# Patient Record
Sex: MALE | Race: WHITE | NOT HISPANIC OR LATINO | Employment: OTHER | URBAN - METROPOLITAN AREA
[De-identification: names, ages, dates, MRNs, and addresses within clinical notes are randomized per-mention and may not be internally consistent; named-entity substitution may affect disease eponyms.]

---

## 2017-03-27 DIAGNOSIS — M54.2 NECK PAIN: ICD-10-CM

## 2017-03-27 DIAGNOSIS — M12.30 PALINDROMIC RHEUMATISM: ICD-10-CM

## 2017-03-27 DIAGNOSIS — N62 GYNECOMASTIA: ICD-10-CM

## 2017-03-27 DIAGNOSIS — R79.89 ABNORMAL LIVER FUNCTION TESTS: ICD-10-CM

## 2017-03-27 PROBLEM — G47.30 SLEEP APNEA: Status: ACTIVE | Noted: 2017-03-27

## 2017-03-27 PROBLEM — J30.9 ALLERGIC RHINITIS: Status: ACTIVE | Noted: 2017-03-27

## 2017-03-27 PROBLEM — E66.9 OBESITY: Status: ACTIVE | Noted: 2017-03-27

## 2017-03-27 PROBLEM — L25.9 CONTACT DERMATITIS: Status: ACTIVE | Noted: 2017-03-27

## 2017-03-27 PROBLEM — A60.00 GENITAL HERPES: Status: ACTIVE | Noted: 2017-03-27

## 2017-03-27 PROBLEM — E78.5 HYPERLIPIDEMIA: Status: ACTIVE | Noted: 2017-03-27

## 2017-03-27 RX ORDER — ATORVASTATIN CALCIUM 20 MG/1
20 TABLET, FILM COATED ORAL NIGHTLY
COMMUNITY
End: 2017-04-28 | Stop reason: SDUPTHER

## 2017-03-29 ENCOUNTER — OFFICE VISIT (OUTPATIENT)
Dept: INTERNAL MEDICINE | Facility: MEDICAL CENTER | Age: 62
End: 2017-03-29
Payer: COMMERCIAL

## 2017-03-29 VITALS
OXYGEN SATURATION: 95 % | TEMPERATURE: 99.5 F | BODY MASS INDEX: 30.48 KG/M2 | HEART RATE: 88 BPM | HEIGHT: 73 IN | WEIGHT: 230 LBS | DIASTOLIC BLOOD PRESSURE: 106 MMHG | SYSTOLIC BLOOD PRESSURE: 162 MMHG

## 2017-03-29 DIAGNOSIS — E66.9 OBESITY (BMI 30-39.9): ICD-10-CM

## 2017-03-29 DIAGNOSIS — R79.89 ABNORMAL LIVER FUNCTION TESTS: ICD-10-CM

## 2017-03-29 DIAGNOSIS — I10 ESSENTIAL HYPERTENSION: ICD-10-CM

## 2017-03-29 DIAGNOSIS — M54.2 NECK PAIN: ICD-10-CM

## 2017-03-29 DIAGNOSIS — Z00.00 HEALTH CARE MAINTENANCE: ICD-10-CM

## 2017-03-29 DIAGNOSIS — M12.30 PALINDROMIC RHEUMATISM: ICD-10-CM

## 2017-03-29 DIAGNOSIS — G47.30 SLEEP APNEA, UNSPECIFIED TYPE: ICD-10-CM

## 2017-03-29 PROBLEM — J30.9 ALLERGIC RHINITIS: Status: RESOLVED | Noted: 2017-03-27 | Resolved: 2017-03-29

## 2017-03-29 PROBLEM — L25.9 CONTACT DERMATITIS: Status: RESOLVED | Noted: 2017-03-27 | Resolved: 2017-03-29

## 2017-03-29 PROBLEM — N62 GYNECOMASTIA: Status: RESOLVED | Noted: 2017-03-27 | Resolved: 2017-03-29

## 2017-03-29 PROCEDURE — 99214 OFFICE O/P EST MOD 30 MIN: CPT | Performed by: INTERNAL MEDICINE

## 2017-03-29 RX ORDER — LISINOPRIL AND HYDROCHLOROTHIAZIDE 20; 12.5 MG/1; MG/1
1 TABLET ORAL DAILY
Qty: 90 TAB | Refills: 0 | Status: SHIPPED | OUTPATIENT
Start: 2017-03-29 | End: 2017-04-28

## 2017-03-29 RX ORDER — AMLODIPINE BESYLATE 5 MG/1
5 TABLET ORAL DAILY
Qty: 90 TAB | Refills: 0 | Status: SHIPPED | OUTPATIENT
Start: 2017-03-29 | End: 2017-06-29 | Stop reason: SDUPTHER

## 2017-03-29 ASSESSMENT — PATIENT HEALTH QUESTIONNAIRE - PHQ9: CLINICAL INTERPRETATION OF PHQ2 SCORE: 0

## 2017-03-29 NOTE — ASSESSMENT & PLAN NOTE
Not using equipt x 3-4 yrs- was diag at [facility]- but wakes rested, no AM HAs-(did have symptoms when diagnosed)- no desire to revisit- weight was 280 at that time. Denies snoring.

## 2017-03-29 NOTE — MR AVS SNAPSHOT
"        Bladimir Mitchell   3/29/2017 8:20 AM   Office Visit   MRN: [id number]    Department:  [facility] - Internal Med   Dept Phone:  [phone]    Description:  Male : 1955   Provider:  Aleshia Irby M.D.           Reason for Visit     Follow-Up Fv BP med refill       Allergies as of 3/29/2017     No Known Allergies      You were diagnosed with     Obesity (BMI 30-39.9)   [665552]       Essential hypertension   [7726727]       Palindromic rheumatism   [6997754]       Sleep apnea, unspecified type   [8137411]       Neck pain   [363124]       Abnormal liver function tests   [691731]       Health care maintenance   [144886]         Vital Signs     Blood Pressure Pulse Temperature Height Weight Body Mass Index    162/106 mmHg 88 37.5 °C (99.5 °F) 1.854 m (6' 1\") 104.327 kg (230 lb) 30.35 kg/m2    Oxygen Saturation Smoking Status                95% Former Smoker          Basic Information     Date Of Birth Sex Race Ethnicity Preferred Language    1955 Male White Unknown English      Your appointments     2017  8:30 AM   Established Patient with Rafa Stoddard M.D.   [facility] Med - Internal Medicine (--)    [address] [place] [id number] [phone]           You will be receiving a confirmation call a few days before your appointment from our automated call confirmation system.              Problem List              ICD-10-CM Priority Class Noted - Resolved    Genital herpes A60.00   3/27/2017 - Present    Neck pain M54.2   3/27/2017 - Present    Abnormal liver function tests R79.89   3/27/2017 - Present    Palindromic rheumatism M12.30   3/27/2017 - Present    Sleep apnea G47.30   3/27/2017 - Present    Hyperlipidemia E78.5   3/27/2017 - Present    Obesity (BMI 30-39.9) E66.9   3/29/2017 - Present    Essential hypertension I10   3/29/2017 - Present      Health Maintenance        Date Due Completion Dates    IMM DTaP/Tdap/Td Vaccine (1 - Tdap) " 11/2/1975 ---    COLONOSCOPY 11/2/2006 ---    IMM INFLUENZA (1) 9/1/2016 10/21/2015, 10/8/2014    IMM ZOSTER VACCINE 11/2/2016 ---            Current Immunizations     Influenza Vaccine Quad Inj (Pf) 10/21/2015  5:02 PM    Influenza Vaccine Quad Inj (Preserved) 10/8/2014      Below and/or attached are the medications your provider expects you to take. Review all of your home medications and newly ordered medications with your provider and/or pharmacist. Follow medication instructions as directed by your provider and/or pharmacist. Please keep your medication list with you and share with your provider. Update the information when medications are discontinued, doses are changed, or new medications (including over-the-counter products) are added; and carry medication information at all times in the event of emergency situations     Allergies:  No Known Allergies          Medications  Valid as of: March 29, 2017 -  8:50 AM    Generic Name Brand Name Tablet Size Instructions for use    AmLODIPine Besylate (Tab) NORVASC 5 MG Take 1 Tab by mouth every day.        Atorvastatin Calcium (Tab) LIPITOR 20 MG Take 20 mg by mouth every evening.        Lidocaine HCl (Solution) XYLOCAINE 2 % Take 5 mL by mouth 6 Times a Day.        Lisinopril-Hydrochlorothiazide (Tab) PRINZIDE, ZESTORETIC 20-12.5 MG Take 1 Tab by mouth every day.        Omega 3-6-9 Fatty Acids (Cap) OMEGA 3-6-9 COMPLEX  Take  by mouth.        .                 Medicines prescribed today were sent to:     [facility] [address] [place] [id number]    Phone: [phone] Fax: [phone]    Open 24 Hours?: No      Medication refill instructions:       If your prescription bottle indicates you have medication refills left, it is not necessary to call your provider’s office. Please contact your pharmacy and they will refill your medication.    If your prescription bottle indicates you do not have any refills  left, you may request refills at any time through one of the following ways: The online [employer] system (except Urgent Care), by calling your provider’s office, or by asking your pharmacy to contact your provider’s office with a refill request. Medication refills are processed only during regular business hours and may not be available until the next business day. Your provider may request additional information or to have a follow-up visit with you prior to refilling your medication.   *Please Note: Medication refills are assigned a new Rx number when refilled electronically. Your pharmacy may indicate that no refills were authorized even though a new prescription for the same medication is available at the pharmacy. Please request the medicine by name with the pharmacy before contacting your provider for a refill.        Your To Do List     Future Labs/Procedures Complete By Expires    BASIC METABOLIC PANEL  As directed 3/30/2018    COMP METABOLIC PANEL  As directed 3/30/2018    LIPID PROFILE  As directed 3/30/2018    TSH WITH REFLEX TO FT4  As directed 3/29/2018      Referral     A referral request has been sent to our patient care coordination department. Please allow 3-5 business days for us to process this request and contact you either by phone or mail. If you do not hear from us by the 5th business day, please call us at [phone].           D1G Status: Patient Declined

## 2017-03-29 NOTE — ASSESSMENT & PLAN NOTE
Out of meds for long while-   The patient denies any symptoms referable to her elevated blood pressure. Specifically denies chest pain, palpitations, dyspnea, orthopnea, PND or peripheral edema. Current medication regimen is as listed below. Patient denies any side effects of medication.    BP high at work- needs down or can't drive ()

## 2017-03-29 NOTE — PROGRESS NOTES
Established Patient    Bladimir Mitchell is a 61 y.o. male who presents today with the following:    CC: Has been well over a year since seen. Out of meds for some time. Needs to get blood pressure controlled for his employment    HPI:    Essential hypertension  Out of meds for long while-   The patient denies any symptoms referable to her elevated blood pressure. Specifically denies chest pain, palpitations, dyspnea, orthopnea, PND or peripheral edema. Current medication regimen is as listed below. Patient denies any side effects of medication.    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 Denies chest pain/pressure; denies numbness/weakness or difficulty with speech/swallowing or sudden change in vision.       Sleep apnea  Not using equipt x 3-4 yrs- [facility]- but wakes rested, no AM HAs-(did have symptoms when diagnosed)- no desire to revisit- weight was 280 at that time. Denies snoring.     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    Obesity (BMI 30-39.9)  Seeing eye doc/dentist  Attn to diet/gym regularly 3-5 days per week.    Abnormal liver function tests  No jaundice.        ROS: As per HPI. Additional pertinent symptoms as noted below.    ROS:  Constitutional ROS: No unexpected change in weight, No weakness, No fatigue  Eye ROS: No recent significant change in vision  Pulmonary ROS: No shortness of breath, No recent change in breathing  Cardiovascular ROS: No chest pain, No edema, No palpitations, No syncope  Gastrointestinal ROS: No abdominal pain, No nausea, vomiting, diarrhea, or constipation  Psychiatric ROS: No depression, No anxiety    Patient Active Problem List    Diagnosis Date Noted   • Obesity (BMI 30-39.9) 03/29/2017   • Essential hypertension 03/29/2017   •  "Genital herpes 03/27/2017   • Neck pain 03/27/2017   • Abnormal liver function tests 03/27/2017   • Palindromic rheumatism 03/27/2017   • Sleep apnea 03/27/2017   • Hyperlipidemia 03/27/2017       Social History   Substance Use Topics   • Smoking status: Former Smoker     Quit date: 03/29/2003   • Smokeless tobacco: None   • Alcohol Use: None       Current Outpatient Prescriptions   Medication Sig Dispense Refill   • amlodipine (NORVASC) 5 MG Tab Take 1 Tab by mouth every day. 90 Tab 0   • lisinopril-hydrochlorothiazide (PRINZIDE, ZESTORETIC) 20-12.5 MG per tablet Take 1 Tab by mouth every day. 90 Tab 0   • atorvastatin (LIPITOR) 20 MG Tab Take 20 mg by mouth every evening.     • Omega 3-6-9 Fatty Acids (OMEGA 3-6-9 COMPLEX) Cap Take  by mouth.     • lidocaine viscous 2% (XYLOCAINE) 2 % Solution Take 5 mL by mouth 6 Times a Day. 200 mL 0     No current facility-administered medications for this visit.       /106 mmHg  Pulse 88  Temp(Src) 37.5 °C (99.5 °F)  Ht 1.854 m (6' 1\")  Wt 104.327 kg (230 lb)  BMI 30.35 kg/m2  SpO2 95%    Physical Exam  General: Well developed, well nourished male, in no distress.  Eyes: Conjuntiva without any obvious injection or erythema.   Ears- canals and TMs clear  Mouth- mucous membranes moist, no erythema  Cardiovascular: Heart is regular with no murmurs, no bruits  Lungs: Clear to auscultation bilaterally. No wheezes, rhonci or crackles heard. Respiratory effort is normal.  Abd: Soft, non-tender  Ext: No edema  Other: Patient in and out of chair, on and off exam table without problem or assistance.          Assessment and Plan      1. Obesity (BMI 30-39.9)  Discussed weight loss goal. Recommended portion control, watching carbs and exercise. Briefly discussed Weight Watchers and other weight loss programs.        - Patient identified as having weight management issue.  Appropriate orders and counseling given.  - TSH WITH REFLEX TO FT4; Future    2. Essential " hypertension  We will resume his previous medications. He was on the lisinopril twice a day will just do once a day for the time being. He is to monitor his blood pressures. We will do some lab work. And repeat the BMP after he's been on the ACE inhibitor for one month. He'll follow-up in one month likely with resident physician to see where his numbers are at that point in time and if meds need to be adjusted.  - COMP METABOLIC PANEL; Future  - LIPID PROFILE; Future  - BASIC METABOLIC PANEL; Future  - amlodipine (NORVASC) 5 MG Tab; Take 1 Tab by mouth every day.  Dispense: 90 Tab; Refill: 0  - lisinopril-hydrochlorothiazide (PRINZIDE, ZESTORETIC) 20-12.5 MG per tablet; Take 1 Tab by mouth every day.  Dispense: 90 Tab; Refill: 0    3. Palindromic rheumatism  Has not had any symptoms and is off his medications. Will just follow for the time being.    4. Sleep apnea, unspecified type  Patient denies any symptoms. No said he was never on equipment. The symptoms resolved with weight loss. Discussed long-term consequences of untreated sleep apnea.    5. Neck pain  Still occasional symptoms radiating into his left hand but these are rare. Overall doing well    6. Abnormal liver function tests  Repeat labs. Most recently were resolved    7. Health care maintenance  Good about diet and exercise.  Up-to-date with vision and dental screening  - REFERRAL TO GI FOR COLONOSCOPY      Return in about 4 weeks (around 4/26/2017).      Signed by: Aleshia Irby M.D.    This note was created using voice recognition software. There may be unintended errors in spelling, grammar or content.

## 2017-03-29 NOTE — ASSESSMENT & PLAN NOTE
Patient denies any signs/symptoms of cardiovascular disease. Denies chest pain/pressure; denies numbness/weakness or difficulty with speech/swallowing or sudden change in vision.

## 2017-04-01 ENCOUNTER — HOSPITAL ENCOUNTER (OUTPATIENT)
Dept: LAB | Facility: MEDICAL CENTER | Age: 62
End: 2017-04-01
Attending: INTERNAL MEDICINE
Payer: COMMERCIAL

## 2017-04-01 DIAGNOSIS — I10 ESSENTIAL HYPERTENSION: ICD-10-CM

## 2017-04-01 DIAGNOSIS — E66.9 OBESITY (BMI 30-39.9): ICD-10-CM

## 2017-04-01 LAB
ALBUMIN SERPL BCP-MCNC: 4.3 G/DL (ref 3.2–4.9)
ALBUMIN/GLOB SERPL: 1.1 G/DL
ALP SERPL-CCNC: 79 U/L (ref 30–99)
ALT SERPL-CCNC: 30 U/L (ref 2–50)
ANION GAP SERPL CALC-SCNC: 9 MMOL/L (ref 0–11.9)
AST SERPL-CCNC: 28 U/L (ref 12–45)
BILIRUB SERPL-MCNC: 1.3 MG/DL (ref 0.1–1.5)
BUN SERPL-MCNC: 18 MG/DL (ref 8–22)
CALCIUM SERPL-MCNC: 10.1 MG/DL (ref 8.5–10.5)
CHLORIDE SERPL-SCNC: 100 MMOL/L (ref 96–112)
CHOLEST SERPL-MCNC: 211 MG/DL (ref 100–199)
CO2 SERPL-SCNC: 30 MMOL/L (ref 20–33)
CREAT SERPL-MCNC: 0.97 MG/DL (ref 0.5–1.4)
GLOBULIN SER CALC-MCNC: 3.9 G/DL (ref 1.9–3.5)
GLUCOSE SERPL-MCNC: 88 MG/DL (ref 65–99)
HDLC SERPL-MCNC: 47 MG/DL
LDLC SERPL CALC-MCNC: 144 MG/DL
POTASSIUM SERPL-SCNC: 3.8 MMOL/L (ref 3.6–5.5)
PROT SERPL-MCNC: 8.2 G/DL (ref 6–8.2)
SODIUM SERPL-SCNC: 139 MMOL/L (ref 135–145)
TRIGL SERPL-MCNC: 102 MG/DL (ref 0–149)
TSH SERPL DL<=0.005 MIU/L-ACNC: 1.56 UIU/ML (ref 0.3–3.7)

## 2017-04-01 PROCEDURE — 36415 COLL VENOUS BLD VENIPUNCTURE: CPT

## 2017-04-01 PROCEDURE — 84443 ASSAY THYROID STIM HORMONE: CPT

## 2017-04-01 PROCEDURE — 80053 COMPREHEN METABOLIC PANEL: CPT

## 2017-04-01 PROCEDURE — 80061 LIPID PANEL: CPT

## 2017-04-28 ENCOUNTER — OFFICE VISIT (OUTPATIENT)
Dept: INTERNAL MEDICINE | Facility: MEDICAL CENTER | Age: 62
End: 2017-04-28
Payer: COMMERCIAL

## 2017-04-28 VITALS
HEART RATE: 72 BPM | DIASTOLIC BLOOD PRESSURE: 98 MMHG | SYSTOLIC BLOOD PRESSURE: 150 MMHG | HEIGHT: 73 IN | BODY MASS INDEX: 30.35 KG/M2 | TEMPERATURE: 98.2 F | WEIGHT: 229 LBS | OXYGEN SATURATION: 97 %

## 2017-04-28 DIAGNOSIS — G47.30 SLEEP APNEA, UNSPECIFIED TYPE: ICD-10-CM

## 2017-04-28 DIAGNOSIS — Z00.00 HEALTH CARE MAINTENANCE: ICD-10-CM

## 2017-04-28 DIAGNOSIS — R77.1 ELEVATED SERUM GLOBULIN LEVEL: ICD-10-CM

## 2017-04-28 DIAGNOSIS — I10 ESSENTIAL HYPERTENSION: ICD-10-CM

## 2017-04-28 DIAGNOSIS — R79.89 ABNORMAL LIVER FUNCTION TESTS: ICD-10-CM

## 2017-04-28 DIAGNOSIS — E78.5 HYPERLIPIDEMIA, UNSPECIFIED HYPERLIPIDEMIA TYPE: ICD-10-CM

## 2017-04-28 DIAGNOSIS — M12.30 PALINDROMIC RHEUMATISM: ICD-10-CM

## 2017-04-28 DIAGNOSIS — E66.9 OBESITY (BMI 30-39.9): ICD-10-CM

## 2017-04-28 DIAGNOSIS — M54.2 NECK PAIN: ICD-10-CM

## 2017-04-28 DIAGNOSIS — B35.1 ONYCHOMYCOSIS OF TOENAIL: ICD-10-CM

## 2017-04-28 PROBLEM — A60.00 GENITAL HERPES: Status: RESOLVED | Noted: 2017-03-27 | Resolved: 2017-04-28

## 2017-04-28 PROCEDURE — 99214 OFFICE O/P EST MOD 30 MIN: CPT | Mod: GC | Performed by: INTERNAL MEDICINE

## 2017-04-28 RX ORDER — LISINOPRIL AND HYDROCHLOROTHIAZIDE 25; 20 MG/1; MG/1
1 TABLET ORAL DAILY
Qty: 30 TAB | Refills: 1 | Status: SHIPPED | OUTPATIENT
Start: 2017-04-28 | End: 2017-06-29 | Stop reason: SDUPTHER

## 2017-04-28 RX ORDER — ATORVASTATIN CALCIUM 20 MG/1
20 TABLET, FILM COATED ORAL DAILY
Qty: 90 TAB | Refills: 1 | Status: SHIPPED | OUTPATIENT
Start: 2017-04-28 | End: 2019-05-22

## 2017-04-28 NOTE — MR AVS SNAPSHOT
"        Bladimir Mitchell   2017 8:30 AM   Office Visit   MRN: [id number]    Department:  [facility] - [facility] Med   Dept Phone:  [phone]    Description:  Male : 1955   Provider:  Rafa Stoddard M.D.           Reason for Visit     Follow-Up blood pressure      Allergies as of 2017     No Known Allergies      You were diagnosed with     Abnormal liver function tests   [308301]       Essential hypertension   [8930599]       Hyperlipidemia, unspecified hyperlipidemia type   [8117041]       Palindromic rheumatism   [0067851]       Obesity (BMI 30-39.9)   [591705]       Sleep apnea, unspecified type   [5193649]       Neck pain   [575944]         Vital Signs     Blood Pressure Pulse Temperature Height Weight Body Mass Index    150/98 mmHg 72 36.8 °C (98.2 °F) 1.854 m (6' 1\") 103.874 kg (229 lb) 30.22 kg/m2    Oxygen Saturation Smoking Status                97% Former Smoker          Basic Information     Date Of Birth Sex Race Ethnicity Preferred Language    1955 Male White Non- English      Problem List              ICD-10-CM Priority Class Noted - Resolved    Genital herpes A60.00   3/27/2017 - Present    Neck pain M54.2   3/27/2017 - Present    Abnormal liver function tests R79.89   3/27/2017 - Present    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 Review all of your home medications and " newly ordered medications with your provider and/or pharmacist. Follow medication instructions as directed by your provider and/or pharmacist. Please keep your medication list with you and share with your provider. Update the information when medications are discontinued, doses are changed, or new medications (including over-the-counter products) are added; and carry medication information at all times in the event of emergency situations     Allergies:  No Known Allergies          Medications  Valid as of: April 28, 2017 -  8:40 AM    Generic Name Brand Name Tablet Size Instructions for use    AmLODIPine Besylate (Tab) NORVASC 5 MG Take 1 Tab by mouth every day.        Atorvastatin Calcium (Tab) LIPITOR 20 MG Take 20 mg by mouth every evening.        Lisinopril-Hydrochlorothiazide (Tab) PRINZIDE, ZESTORETIC 20-12.5 MG Take 1 Tab by mouth every day.        Omega 3-6-9 Fatty Acids (Cap) OMEGA 3-6-9 COMPLEX  Take  by mouth.        .                 Medicines prescribed today were sent to:     [facility] [address] [place] [address]    Phone: [phone] Fax: [phone]    Open 24 Hours?: No      Medication refill instructions:       If your prescription bottle indicates you have medication refills left, it is not necessary to call your provider’s office. Please contact your pharmacy and they will refill your medication.    If your prescription bottle indicates you do not have any refills left, you may request refills at any time through one of the following ways: The online [employer] system (except Urgent Care), by calling your provider’s office, or by asking your pharmacy to contact your provider’s office with a refill request. Medication refills are processed only during regular business hours and may not be available until the next business day. Your provider may request additional information or to have a follow-up visit with you prior to refilling your  medication.   *Please Note: Medication refills are assigned a new Rx number when refilled electronically. Your pharmacy may indicate that no refills were authorized even though a new prescription for the same medication is available at the pharmacy. Please request the medicine by name with the pharmacy before contacting your provider for a refill.        Instructions    Follow up with PCP in 3 months  Continue same medications  Follow up with GI          MyChart Status: Patient Declined

## 2017-04-28 NOTE — PROGRESS NOTES
Established Patient    Bladimir presents today with the following:    CC: Follow up on recent labs, blood pressure, onychomycosis    HPI:   ID: 61-year-old gentleman with past medical history of hypertension, palindrome rheumatism, hyperlipidemia, obstructive sleep apnea, neck pain, obesity, abnormal liver function test came in for follow-up for above-mentioned reasons.    PCP Dr. Irby.    Essential hypertension   Patient was out of medication for a long time  He was taking his blood pressure at home which was persistently at higher  He denied any symptoms including chest pain, palpitation, dyspnea, orthopnea, exertional dyspnea, leg edema  BP high at work- needs down or can't drive ()  He was started on amlodipine 5 mg and lisinopril/HCTZ combination 12.5 /20 combination  CBC CMP and TSH was ordered and he came for follow-up today  Review of labs shows liver function kidney function within normal limits  Blood pressure is better controlled  Compliant with medication, healthy lifestyle and diet more prescriptions    Recurrent nail onychomycosis  History of recurrent nail onychomycosis, no itching, also complains of ingrowing nail  Previously treated with Lamisil  Patient requesting another prescription for Lamisil but educated him about the side effects and needs further evaluation by podiatrist given recurrent episode  No other fungal or skin lesions      Elevated globulin level  History of elevated globulin level in recent CMP  No back pain, hypercalcemia or renal function abnormalities  We will need to follow      Palindromic rheumatism  No active joint pain  Does not follow with rheumatology  Intermittently gets pain and aches but doing fine      Hyperlipidemia  Repeat lipid panel shows total cholesterol 211, triglyceride 100, HDL 47,   ASCVD risk score she was 10 years risk of cardiovascular event 16.3% he is on moderate to high intensity statin with Lipitor 40 mg  Denies any chest  pain pressure or numbness weakness difficulty, change in vision speech swallow difficulty        Sleep apnea  He has lost weight from 280 pounds to 2:30 pounds   he does not have any symptoms of obstructive sleep apnea including morning and morning headaches, fatigue, dry mouth snoring. He used to have an MRI diagnosis  He does not use CPAP for 3-4 years  He does not want to revisit sleep clinic      Neck pain  Occas chiropractor  Denies Pain radiation, recent fall or trauma  Uses when necessary pain medication   Denies numbness tingling       Obesity (BMI 30-39.9)  Seeing eye doc/dentist  Attn to diet/gym regularly 3-5 days per week.    Abnormal liver function tests  [facility]  No jaundice.  Elevated bilirubin of previous chem panel but repeat chem panel shows normal bilirubin and normal liver enzymes    Patient Active Problem List    Diagnosis Date Noted   • Obesity (BMI 30-39.9) 03/29/2017   • Essential hypertension 03/29/2017   • Genital herpes 03/27/2017   • Neck pain 03/27/2017   • Abnormal liver function tests 03/27/2017   • Palindromic rheumatism 03/27/2017   • Sleep apnea 03/27/2017   • Hyperlipidemia 03/27/2017       Current Outpatient Prescriptions   Medication Sig Dispense Refill   • amlodipine (NORVASC) 5 MG Tab Take 1 Tab by mouth every day. 90 Tab 0   • lisinopril-hydrochlorothiazide (PRINZIDE, ZESTORETIC) 20-12.5 MG per tablet Take 1 Tab by mouth every day. 90 Tab 0   • atorvastatin (LIPITOR) 20 MG Tab Take 20 mg by mouth every evening.     • Omega 3-6-9 Fatty Acids (OMEGA 3-6-9 COMPLEX) Cap Take  by mouth.     • lidocaine viscous 2% (XYLOCAINE) 2 % Solution Take 5 mL by mouth 6 Times a Day. 200 mL 0     No current facility-administered medications for this visit.       ROS: As per HPI. Additional pertinent symptoms as noted below.    General: Denies any weight changes, malaise, fever   Cardiovascular: Denies chest pain, palpitation, orthopnea, PND  Lungs: Denies cough shortness of  breath and phlegm  GI: Denies nausea vomiting abdominal pain, black-colored stools  Musculoskeletal: Denies any joint pain or bone pain arthritis  Neurology: Denies any numbness tingling or focal deficits  Psychiatric: Denies any depressed mood, hallucinations and delusions    Pulse 72, oxygen saturation 97%, blood pressure 150/98, afebrile    Physical Exam   Constitutional:  oriented to person, place, and time. No distress.   Eyes: Pupils are equal, round, and reactive to light. No scleral icterus.  Neck: Neck supple. No thyromegaly present.   Cardiovascular: Normal rate, regular rhythm and normal heart sounds.  Exam reveals no gallop and no friction rub.  No murmur heard.  Pulmonary/Chest: Breath sounds normal. Chest wall is not dull to percussion.   Musculoskeletal:   no edema.   Lymphadenopathy: no cervical adenopathy  Neurological: alert and oriented to person, place, and time.   Skin: No cyanosis. Nails show no clubbing. Ingrowing nail with onychomycosis      Assessment and Plan    Essential hypertension   Patient was out of medication for a long time  He was taking his blood pressure at home which was persistently at higher  He denied any symptoms including chest pain, palpitation, dyspnea, orthopnea, exertional dyspnea, leg edema  BP high at work- needs down or can't drive ()  He was started on amlodipine 5 mg and lisinopril/HCTZ combination 20 /12.5 combination  CBC CMP and TSH was ordered and he came for follow-up today  Review of labs shows liver function kidney function within normal limits  Blood pressure is better controlled  Compliant with medication, healthy lifestyle and diet more prescriptions  Plan  Change lisinopril/HCTZ combination 20/25 daily  Continue same dose of amlodipine  Recommend check blood pressure twice a week and keep log AND give us a call about average reading and will adjust medications accordingly  Encouraged healthy lifestyle and diet.    Recurrent nail  onychomycosis  History of recurrent nail onychomycosis, no itching, also complains of ingrowing nail  Previously treated with Lamisil  Patient requesting another prescription for Lamisil but educated him about the side effects and needs further evaluation by podiatrist given recurrent episode  No other fungal or skin lesions.  Plan  Refer to podiatrist      Elevated globulin level  History of elevated globulin level in recent CMP  No back pain, hypercalcemia or renal function abnormalities  We will need to follow  Plan  Will follow up on globulin level if persistently elevated or any abnormal kidney function and calcium level will get serum protein electrophoresis      Palindromic rheumatism  No active joint pain  Does not follow with rheumatology  Intermittently gets pain and aches but doing fine  Plan   when necessary Tylenol      Hyperlipidemia  Repeat lipid panel shows total cholesterol 211, triglyceride 100, HDL 47,   ASCVD risk score she was 10 years risk of cardiovascular event 16.3% he is on moderate to high intensity statin with Lipitor 40 mg  Denies any chest pain pressure or numbness weakness difficulty, change in vision speech swallow difficulty  Plan  Start him on moderate intensity Lipitor 20 mg daily and omega-3 fatty acids  Repeat lipid panel in 3-6 months and follow-up with Dr. Irby        Sleep apnea  He has lost weight from 280 pounds to 2:30 pounds   he does not have any symptoms of obstructive sleep apnea including morning and morning headaches, fatigue, dry mouth snoring. He used to have an MRI diagnosis  He does not use CPAP for 3-4 years  He does not want to revisit sleep clinic  Plan  Encouraged healthy lifestyle, weight loss, diet and exercise      Neck pain  Occas chiropractor  Denies Pain radiation, recent fall or trauma  Uses when necessary pain medication   Denies numbness tingling  Plan  As needed pain medication Tylenol       Obesity (BMI 30-39.9)  Seeing eye  doc/dentist  Attn to diet/gym regularly 3-5 days per week  Encouraged healthy lifestyle, weight loss, diet and exercise.    Abnormal liver function tests  Healthcare maintenance    No jaundice.  Elevated bilirubin of previous chem panel but repeat chem panel shows normal bilirubin and normal liver enzymes  Yearly CMP   already has referral for colonoscopy will get in December      Followup: 3 months    Signed by: Rafa Stoddard M.D.

## 2017-06-29 RX ORDER — LISINOPRIL AND HYDROCHLOROTHIAZIDE 25; 20 MG/1; MG/1
TABLET ORAL
Qty: 30 TAB | Refills: 0 | Status: SHIPPED | OUTPATIENT
Start: 2017-06-29 | End: 2017-08-07 | Stop reason: SDUPTHER

## 2017-06-29 RX ORDER — AMLODIPINE BESYLATE 5 MG/1
TABLET ORAL
Qty: 90 TAB | Refills: 0 | Status: SHIPPED | OUTPATIENT
Start: 2017-06-29 | End: 2017-10-16 | Stop reason: SDUPTHER

## 2017-06-29 NOTE — TELEPHONE ENCOUNTER
Was the patient seen in the last year in this department? Yes Last seen 4/28/17 Dr Stoddard next appt not scheduled    Does patient have an active prescription for medications requested? No     Received Request Via: Pharmacy

## 2017-07-03 ENCOUNTER — APPOINTMENT (OUTPATIENT)
Dept: INTERNAL MEDICINE | Facility: MEDICAL CENTER | Age: 62
End: 2017-07-03
Payer: COMMERCIAL

## 2017-08-08 RX ORDER — LISINOPRIL AND HYDROCHLOROTHIAZIDE 25; 20 MG/1; MG/1
TABLET ORAL
Qty: 30 TAB | Refills: 0 | Status: SHIPPED | OUTPATIENT
Start: 2017-08-08 | End: 2017-09-12 | Stop reason: SDUPTHER

## 2017-08-08 NOTE — TELEPHONE ENCOUNTER
Last seen: 04/28/17 by Dr. Stoddard  Next appt: None     Was the patient seen in the last year in this department? Yes   Does patient have an active prescription for medications requested? No   Received Request Via: Pharmacy

## 2018-02-09 ENCOUNTER — HOSPITAL ENCOUNTER (OUTPATIENT)
Dept: LAB | Facility: MEDICAL CENTER | Age: 63
End: 2018-02-09
Attending: FAMILY MEDICINE
Payer: COMMERCIAL

## 2018-02-09 LAB
ALBUMIN SERPL BCP-MCNC: 4.4 G/DL (ref 3.2–4.9)
ALBUMIN/GLOB SERPL: 1.3 G/DL
ALP SERPL-CCNC: 58 U/L (ref 30–99)
ALT SERPL-CCNC: 24 U/L (ref 2–50)
ANION GAP SERPL CALC-SCNC: 8 MMOL/L (ref 0–11.9)
AST SERPL-CCNC: 23 U/L (ref 12–45)
BASOPHILS # BLD AUTO: 0.6 % (ref 0–1.8)
BASOPHILS # BLD: 0.04 K/UL (ref 0–0.12)
BILIRUB SERPL-MCNC: 1.6 MG/DL (ref 0.1–1.5)
BUN SERPL-MCNC: 16 MG/DL (ref 8–22)
CALCIUM SERPL-MCNC: 9.4 MG/DL (ref 8.5–10.5)
CHLORIDE SERPL-SCNC: 103 MMOL/L (ref 96–112)
CHOLEST SERPL-MCNC: 209 MG/DL (ref 100–199)
CO2 SERPL-SCNC: 30 MMOL/L (ref 20–33)
CREAT SERPL-MCNC: 0.88 MG/DL (ref 0.5–1.4)
EOSINOPHIL # BLD AUTO: 0.14 K/UL (ref 0–0.51)
EOSINOPHIL NFR BLD: 2.2 % (ref 0–6.9)
ERYTHROCYTE [DISTWIDTH] IN BLOOD BY AUTOMATED COUNT: 41.1 FL (ref 35.9–50)
GLOBULIN SER CALC-MCNC: 3.3 G/DL (ref 1.9–3.5)
GLUCOSE SERPL-MCNC: 94 MG/DL (ref 65–99)
HCT VFR BLD AUTO: 48.8 % (ref 42–52)
HDLC SERPL-MCNC: 46 MG/DL
HGB BLD-MCNC: 17.2 G/DL (ref 14–18)
IMM GRANULOCYTES # BLD AUTO: 0.01 K/UL (ref 0–0.11)
IMM GRANULOCYTES NFR BLD AUTO: 0.2 % (ref 0–0.9)
LDLC SERPL CALC-MCNC: 146 MG/DL
LYMPHOCYTES # BLD AUTO: 1.74 K/UL (ref 1–4.8)
LYMPHOCYTES NFR BLD: 27.3 % (ref 22–41)
MCH RBC QN AUTO: 31.4 PG (ref 27–33)
MCHC RBC AUTO-ENTMCNC: 35.2 G/DL (ref 33.7–35.3)
MCV RBC AUTO: 89.1 FL (ref 81.4–97.8)
MONOCYTES # BLD AUTO: 0.5 K/UL (ref 0–0.85)
MONOCYTES NFR BLD AUTO: 7.8 % (ref 0–13.4)
NEUTROPHILS # BLD AUTO: 3.94 K/UL (ref 1.82–7.42)
NEUTROPHILS NFR BLD: 61.9 % (ref 44–72)
NRBC # BLD AUTO: 0 K/UL
NRBC BLD-RTO: 0 /100 WBC
PLATELET # BLD AUTO: 298 K/UL (ref 164–446)
PMV BLD AUTO: 9.5 FL (ref 9–12.9)
POTASSIUM SERPL-SCNC: 3.3 MMOL/L (ref 3.6–5.5)
PROT SERPL-MCNC: 7.7 G/DL (ref 6–8.2)
PSA SERPL-MCNC: 0.4 NG/ML (ref 0–4)
RBC # BLD AUTO: 5.48 M/UL (ref 4.7–6.1)
SODIUM SERPL-SCNC: 141 MMOL/L (ref 135–145)
TRIGL SERPL-MCNC: 83 MG/DL (ref 0–149)
TSH SERPL DL<=0.005 MIU/L-ACNC: 1.27 UIU/ML (ref 0.38–5.33)
WBC # BLD AUTO: 6.4 K/UL (ref 4.8–10.8)

## 2018-02-09 PROCEDURE — 84153 ASSAY OF PSA TOTAL: CPT

## 2018-02-09 PROCEDURE — 36415 COLL VENOUS BLD VENIPUNCTURE: CPT

## 2018-02-09 PROCEDURE — 84443 ASSAY THYROID STIM HORMONE: CPT

## 2018-02-09 PROCEDURE — 80061 LIPID PANEL: CPT

## 2018-02-09 PROCEDURE — 85025 COMPLETE CBC W/AUTO DIFF WBC: CPT

## 2018-02-09 PROCEDURE — 80053 COMPREHEN METABOLIC PANEL: CPT

## 2019-04-22 ENCOUNTER — OFFICE VISIT (OUTPATIENT)
Dept: URGENT CARE | Facility: PHYSICIAN GROUP | Age: 64
End: 2019-04-22
Payer: COMMERCIAL

## 2019-04-22 ENCOUNTER — HOSPITAL ENCOUNTER (OUTPATIENT)
Dept: RADIOLOGY | Facility: MEDICAL CENTER | Age: 64
End: 2019-04-22
Attending: PHYSICIAN ASSISTANT
Payer: COMMERCIAL

## 2019-04-22 VITALS
OXYGEN SATURATION: 95 % | TEMPERATURE: 97.3 F | RESPIRATION RATE: 16 BRPM | HEART RATE: 81 BPM | BODY MASS INDEX: 29.29 KG/M2 | WEIGHT: 221 LBS | HEIGHT: 73 IN | DIASTOLIC BLOOD PRESSURE: 84 MMHG | SYSTOLIC BLOOD PRESSURE: 132 MMHG

## 2019-04-22 DIAGNOSIS — R05.9 COUGH: ICD-10-CM

## 2019-04-22 DIAGNOSIS — J06.9 UPPER RESPIRATORY TRACT INFECTION, UNSPECIFIED TYPE: ICD-10-CM

## 2019-04-22 PROCEDURE — 71046 X-RAY EXAM CHEST 2 VIEWS: CPT

## 2019-04-22 PROCEDURE — 99214 OFFICE O/P EST MOD 30 MIN: CPT | Performed by: PHYSICIAN ASSISTANT

## 2019-04-22 RX ORDER — BENZONATATE 100 MG/1
100-200 CAPSULE ORAL 3 TIMES DAILY PRN
Qty: 60 CAP | Refills: 0 | Status: SHIPPED | OUTPATIENT
Start: 2019-04-22 | End: 2019-05-22

## 2019-04-22 RX ORDER — AZITHROMYCIN 250 MG/1
TABLET, FILM COATED ORAL
Qty: 6 TAB | Refills: 0 | Status: SHIPPED | OUTPATIENT
Start: 2019-04-22 | End: 2019-05-22

## 2019-04-23 ASSESSMENT — ENCOUNTER SYMPTOMS
DIARRHEA: 0
COUGH: 1
SPUTUM PRODUCTION: 0
SHORTNESS OF BREATH: 0
CHILLS: 0
DIZZINESS: 0
FEVER: 0
VOMITING: 0
ABDOMINAL PAIN: 0
MUSCULOSKELETAL NEGATIVE: 1
HEADACHES: 0
NAUSEA: 0
HEMOPTYSIS: 0
WHEEZING: 0
WEIGHT LOSS: 0
SORE THROAT: 0

## 2019-04-23 ASSESSMENT — COPD QUESTIONNAIRES: COPD: 0

## 2019-04-23 NOTE — PROGRESS NOTES
"Subjective:      Bladimir Mitchell is a 63 y.o. male who presents with Cough (x 3 weeks )            Cough   This is a new problem. The current episode started more than 1 month ago (2 months). The problem has been gradually worsening. The problem occurs every few minutes. The cough is non-productive. Pertinent negatives include no chest pain, chills, ear pain, fever, headaches, hemoptysis, nasal congestion, postnasal drip, rash, sore throat, shortness of breath, weight loss or wheezing. The symptoms are aggravated by lying down. Risk factors for lung disease include smoking/tobacco exposure. He has tried OTC cough suppressant for the symptoms. The treatment provided mild relief. There is no history of asthma or COPD.     Patient presents to urgent care reporting a 2 month history of a dry cough, that has recently worsened over the past 2-3 weeks. He denies fevers, chills, body aches, chest pain, hemoptysis, wheezing, or SOB. No history of chronic lung disease or pneumonia. He is a former smoker. No weight loss or night sweats. No known sick contacts or recent use of antibiotics.     Review of Systems   Constitutional: Negative for chills, fever and weight loss.   HENT: Positive for congestion. Negative for ear pain, postnasal drip and sore throat.    Respiratory: Positive for cough. Negative for hemoptysis, sputum production, shortness of breath and wheezing.    Cardiovascular: Negative for chest pain.   Gastrointestinal: Negative for abdominal pain, diarrhea, nausea and vomiting.   Genitourinary: Negative.    Musculoskeletal: Negative.    Skin: Negative for rash.   Neurological: Negative for dizziness and headaches.        Objective:     /84 (BP Location: Left arm, Patient Position: Sitting, BP Cuff Size: Adult)   Pulse 81   Temp 36.3 °C (97.3 °F) (Temporal)   Resp 16   Ht 1.854 m (6' 1\")   Wt 100.2 kg (221 lb)   SpO2 95%   BMI 29.16 kg/m²      Physical Exam   Constitutional: He is oriented to " person, place, and time. He appears well-developed and well-nourished. No distress.   HENT:   Head: Normocephalic and atraumatic.   Right Ear: Hearing, tympanic membrane, external ear and ear canal normal.   Left Ear: Hearing, tympanic membrane, external ear and ear canal normal.   Mouth/Throat: Posterior oropharyngeal erythema present. No oropharyngeal exudate or posterior oropharyngeal edema.   Eyes: Pupils are equal, round, and reactive to light. Conjunctivae are normal. Right eye exhibits no discharge. Left eye exhibits no discharge.   Neck: Normal range of motion.   Cardiovascular: Normal rate, regular rhythm and normal heart sounds.    No murmur heard.  Pulmonary/Chest: Effort normal and breath sounds normal. No respiratory distress. He has no wheezes. He has no rales.   Dry cough present throughout exam   Musculoskeletal: Normal range of motion.   Lymphadenopathy:     He has no cervical adenopathy.   Neurological: He is alert and oriented to person, place, and time.   Skin: Skin is warm and dry. He is not diaphoretic.   Psychiatric: He has a normal mood and affect. His behavior is normal.   Nursing note and vitals reviewed.         PMH:  has a past medical history of Abnormal liver function tests (3/27/2017); Allergic rhinitis (3/27/2017); Contact dermatitis (3/27/2017); Genital herpes (3/27/2017); Gynecomastia (3/27/2017); Hyperlipidemia (3/27/2017); Hypertension; Neck pain (3/27/2017); Obesity (3/27/2017); Palindromic rheumatism (3/27/2017); RA (rheumatoid arthritis) ([facility]); and Sleep apnea (3/27/2017).  MEDS:   Current Outpatient Prescriptions:   •  azithromycin (ZITHROMAX) 250 MG Tab, Take 2 tablets on day one, then 1 tablet on days two through five, Disp: 6 Tab, Rfl: 0  •  benzonatate (TESSALON) 100 MG Cap, Take 1-2 Caps by mouth 3 times a day as needed., Disp: 60 Cap, Rfl: 0  •  amlodipine (NORVASC) 5 MG Tab, TAKE ONE TABLET BY MOUTH ONCE DAILY, Disp: 30 Tab, Rfl: 0  •  lisinopril-hydrochlorothiazide  (PRINZIDE, ZESTORETIC) 20-25 MG per tablet, TAKE ONE TABLET BY MOUTH ONCE DAILY, Disp: 90 Tab, Rfl: 1  •  atorvastatin (LIPITOR) 20 MG Tab, Take 1 Tab by mouth every day., Disp: 90 Tab, Rfl: 1  •  Omega 3-6-9 Fatty Acids (OMEGA 3-6-9 COMPLEX) Cap, Take 1 Capsule by mouth every day., Disp: 90 Cap, Rfl: 1  ALLERGIES: No Known Allergies  SURGHX:   Past Surgical History:   Procedure Laterality Date   • ABDOMINAL EXPLORATION     • CHOLECYSTECTOMY       SOCHX:  reports that he quit smoking about 16 years ago. He has never used smokeless tobacco. He reports that he drinks alcohol. He reports that he does not use drugs.  FH: family history is not on file.       Assessment/Plan:     1. Upper respiratory tract infection, unspecified type  - DX-CHEST-2 VIEWS; Future  Impression       No active disease.     report per radiology.  I have reviewed the films and agree with the reading    - azithromycin (ZITHROMAX) 250 MG Tab; Take 2 tablets on day one, then 1 tablet on days two through five  Dispense: 6 Tab; Refill: 0   - Complete full course of antibiotics as prescribed   - benzonatate (TESSALON) 100 MG Cap; Take 1-2 Caps by mouth 3 times a day as needed.  Dispense: 60 Cap; Refill: 0    RTC or follow up with primary care if symptoms persist/worsen. The patient demonstrated a good understanding and agreed with the treatment plan.

## 2019-05-22 ENCOUNTER — OFFICE VISIT (OUTPATIENT)
Dept: INTERNAL MEDICINE | Facility: MEDICAL CENTER | Age: 64
End: 2019-05-22
Payer: COMMERCIAL

## 2019-05-22 VITALS
TEMPERATURE: 99.2 F | DIASTOLIC BLOOD PRESSURE: 81 MMHG | HEIGHT: 73 IN | BODY MASS INDEX: 30.35 KG/M2 | WEIGHT: 229 LBS | SYSTOLIC BLOOD PRESSURE: 142 MMHG | OXYGEN SATURATION: 95 % | HEART RATE: 73 BPM

## 2019-05-22 DIAGNOSIS — R05.3 CHRONIC COUGH: ICD-10-CM

## 2019-05-22 DIAGNOSIS — E78.5 HYPERLIPIDEMIA, UNSPECIFIED HYPERLIPIDEMIA TYPE: ICD-10-CM

## 2019-05-22 DIAGNOSIS — I10 ESSENTIAL HYPERTENSION: ICD-10-CM

## 2019-05-22 PROCEDURE — 99214 OFFICE O/P EST MOD 30 MIN: CPT | Performed by: INTERNAL MEDICINE

## 2019-05-22 RX ORDER — ATORVASTATIN CALCIUM 40 MG/1
40 TABLET, FILM COATED ORAL DAILY
Qty: 90 TAB | Refills: 1 | Status: SHIPPED | OUTPATIENT
Start: 2019-05-22 | End: 2019-12-02 | Stop reason: SDUPTHER

## 2019-05-22 RX ORDER — LISINOPRIL AND HYDROCHLOROTHIAZIDE 20; 12.5 MG/1; MG/1
1 TABLET ORAL
Refills: 0 | COMMUNITY
Start: 2019-04-25 | End: 2019-05-22 | Stop reason: SDUPTHER

## 2019-05-22 RX ORDER — FLUTICASONE PROPIONATE 50 MCG
1 SPRAY, SUSPENSION (ML) NASAL 2 TIMES DAILY
Qty: 1 BOTTLE | Refills: 1 | Status: SHIPPED | OUTPATIENT
Start: 2019-05-22 | End: 2019-12-02

## 2019-05-22 RX ORDER — AMLODIPINE BESYLATE 5 MG/1
10 TABLET ORAL DAILY
Qty: 180 TAB | Refills: 1 | Status: SHIPPED | OUTPATIENT
Start: 2019-05-22 | End: 2019-12-02 | Stop reason: SDUPTHER

## 2019-05-22 RX ORDER — LISINOPRIL AND HYDROCHLOROTHIAZIDE 20; 12.5 MG/1; MG/1
1 TABLET ORAL
Qty: 90 TAB | Refills: 1 | Status: SHIPPED | OUTPATIENT
Start: 2019-05-22 | End: 2019-12-02 | Stop reason: SDUPTHER

## 2019-05-22 RX ORDER — OMEPRAZOLE 20 MG/1
20 CAPSULE, DELAYED RELEASE ORAL DAILY
Qty: 30 CAP | Refills: 1 | Status: SHIPPED | OUTPATIENT
Start: 2019-05-22 | End: 2019-12-02

## 2019-05-22 RX ORDER — AMLODIPINE BESYLATE 5 MG/1
10 TABLET ORAL
COMMUNITY
Start: 2014-06-20 | End: 2019-05-22 | Stop reason: SDUPTHER

## 2019-05-22 ASSESSMENT — PATIENT HEALTH QUESTIONNAIRE - PHQ9: CLINICAL INTERPRETATION OF PHQ2 SCORE: 0

## 2019-05-22 NOTE — PROGRESS NOTES
Bladimir Mitchell is a 63 y.o. male who is here for routine follow up.    CC: Cough, Hypertension    HPI:    Patient is a 63 year old male who is presenting to the clinic today for a few concerns. He does want to establish care but this was an acute visit for concerns of cough.  Patient says that he is been having cough for a few months now.  He said he was seen at urgent care a while back and was prescribed a Z-Andriy at that time.  He still noticing a persistent cough since that time.  He said it is worse when he is waking up.  He does notice it throughout the day but it is a lot worse when he waking up, is dry throughout the day but when he wakes up in the morning, it is productive at that time.  He does not have any sinus symptoms, no fever, no chills, no shortness of breath, no difficulty breathing, no symptoms of heartburn.  He said he has not had allergies in a while but used to at some point.  He is a former smoker and quit 16 years ago.  There is been no changes in his medications.  It should be noted that he is on lisinopril.    Patient also says that he needs refills on his medications for his cholesterol as well as his blood pressure medication.  He did have a chest x-ray done when he went to urgent care and this was done April 22 which showed no active disease and no pulmonary infiltrates or consolidations.  When he was seen at the urgent care in April, he stated that he had already been having the cough for a few months before that.  Along with azithromycin, he was also given Tessalon Perles.  He said overall it did get better after he got the Z-Andriy but then now it seems to have stabilized and did not get any better.    No problem-specific Assessment & Plan notes found for this encounter.      He  has a past medical history of Abnormal liver function tests (3/27/2017); Allergic rhinitis (3/27/2017); Contact dermatitis (3/27/2017); Genital herpes (3/27/2017); Gynecomastia (3/27/2017);  "Hyperlipidemia (3/27/2017); Hypertension; Neck pain (3/27/2017); Obesity (3/27/2017); Palindromic rheumatism (3/27/2017); RA (rheumatoid arthritis) ([facility]); and Sleep apnea (3/27/2017).    Patient Active Problem List    Diagnosis Date Noted   • Onychomycosis of toenail 04/28/2017   • Elevated serum globulin level 04/28/2017   • Health care maintenance 04/28/2017   • Obesity (BMI 30-39.9) 03/29/2017   • Essential hypertension 03/29/2017   • Neck pain 03/27/2017   • Abnormal liver function tests 03/27/2017   • Palindromic rheumatism 03/27/2017   • Sleep apnea 03/27/2017   • Hyperlipidemia 03/27/2017       Allergies:Patient has no known allergies.    Current Outpatient Prescriptions   Medication Sig Dispense Refill   • amLODIPine (NORVASC) 5 MG Tab Take 2 Tabs by mouth every day. 180 Tab 1   • lisinopril-hydrochlorothiazide (PRINZIDE, ZESTORETIC) 20-12.5 MG per tablet Take 1 Tab by mouth every day. 90 Tab 1   • atorvastatin (LIPITOR) 40 MG Tab Take 1 Tab by mouth every day. 90 Tab 1   • omeprazole (PRILOSEC) 20 MG delayed-release capsule Take 1 Cap by mouth every day. 30 Cap 1   • fluticasone (FLONASE) 50 MCG/ACT nasal spray Spray 1 Spray in nose 2 times a day. 1 Bottle 1     No current facility-administered medications for this visit.        Social History   Substance Use Topics   • Smoking status: Former Smoker     Quit date: 3/29/2003   • Smokeless tobacco: Never Used   • Alcohol use 0.6 - 1.2 oz/week     1 - 2 Shots of liquor per week      Comment: min       No family history on file.      Review of Systems:   Pertinent positives as stated in HPI, all others reviewed as negative.    Physical Exam:  /81 (BP Location: Left arm, Patient Position: Sitting, BP Cuff Size: Adult)   Pulse 73   Temp 37.3 °C (99.2 °F) (Temporal)   Ht 1.854 m (6' 1\")   Wt 103.9 kg (229 lb)   SpO2 95%  Body mass index is 30.21 kg/m².    General Appearance: overweight, alert, no distress, cooperative  Skin:  No rashes or " lesions.  Head: Normocephalic. No masses appreciated.   Oropharynx: Oropharynx moist and without lesion.  Lungs: Lungs clear to auscultation bilaterally.  Heart: RRR without murmur, gallop, or rubs.  Abdomen: Soft, non-tender. BS normal.   Musculoskeletal: Extremities: Upper and lower extremities appear normal. No deformities, edema.   Psychiatric: Mood appears normal.     Assessment/Plan:     1. Hyperlipidemia, unspecified hyperlipidemia type  Refill Lipitor. No new complaints with this medication. Lipid panel ordered for him to get done before he comes to establish care.  - atorvastatin (LIPITOR) 40 MG Tab; Take 1 Tab by mouth every day.  Dispense: 90 Tab; Refill: 1  - Lipid Profile; Future    2. Essential hypertension  BP is 142/81 today. Continue on the medications as prescribed for now. Labs ordered.   - amLODIPine (NORVASC) 5 MG Tab; Take 2 Tabs by mouth every day.  Dispense: 180 Tab; Refill: 1  - lisinopril-hydrochlorothiazide (PRINZIDE, ZESTORETIC) 20-12.5 MG per tablet; Take 1 Tab by mouth every day.  Dispense: 90 Tab; Refill: 1  - CBC WITH DIFFERENTIAL; Future  - Comp Metabolic Panel; Future    3. Chronic cough  Doesn't seem like infectious in nature. Could possibly be GERD, Post nasal drip, or both. Could be the Lisinopril. He is an ex-smoker so may have some component of lung disease. Recommended PPI and Flonase for now. If symptoms do not get better in 2 weeks, recommended to get PFTs. If persistent, may also benefit from Albuterol or Ipratropium PRN to help overcome this cough which started out as an infectious etiology and has become persistent now.   - CBC WITH DIFFERENTIAL; Future  - omeprazole (PRILOSEC) 20 MG delayed-release capsule; Take 1 Cap by mouth every day.  Dispense: 30 Cap; Refill: 1  - fluticasone (FLONASE) 50 MCG/ACT nasal spray; Spray 1 Spray in nose 2 times a day.  Dispense: 1 Bottle; Refill: 1  - PULMONARY FUNCTION TESTS -Test requested: Complete Pulmonary Function Test;  Future      Followup: Return in about 3 months (around 8/22/2019), or if symptoms worsen or fail to improve, for [facility] care, [place].    This note was created using voice recognition software. There may be unintended errors spelling, and grammar or content.

## 2019-05-22 NOTE — PATIENT INSTRUCTIONS
TAKE THE OMEPRAZOLE AND FLONASE DAILY FOR 2 WEEKS CONSISTENTLY. IF YOUR COUGH DOESN'T IMPROVE, GET THE PULMONARY FUNCTION TESTS DONE.       Cough, Adult  Coughing is a reflex that clears your throat and your airways. Coughing helps to heal and protect your lungs. It is normal to cough occasionally, but a cough that happens with other symptoms or lasts a long time may be a sign of a condition that needs treatment. A cough may last only 2-3 weeks (acute), or it may last longer than 8 weeks (chronic).  What are the causes?  Coughing is commonly caused by:  · Breathing in substances that irritate your lungs.  · A viral or bacterial respiratory infection.  · Allergies.  · Asthma.  · Postnasal drip.  · Smoking.  · Acid backing up from the stomach into the esophagus (gastroesophageal reflux).  · Certain medicines.  · Chronic lung problems, including COPD (or rarely, lung cancer).  · Other medical conditions such as heart failure.  Follow these instructions at home:  Pay attention to any changes in your symptoms. Take these actions to help with your discomfort:  · Take medicines only as told by your health care provider.  ¨ If you were prescribed an antibiotic medicine, take it as told by your health care provider. Do not stop taking the antibiotic even if you start to feel better.  ¨ Talk with your health care provider before you take a cough suppressant medicine.  · Drink enough fluid to keep your urine clear or pale yellow.  · If the air is dry, use a cold steam vaporizer or humidifier in your bedroom or your home to help loosen secretions.  · Avoid anything that causes you to cough at work or at home.  · If your cough is worse at night, try sleeping in a semi-upright position.  · Avoid cigarette smoke. If you smoke, quit smoking. If you need help quitting, ask your health care provider.  · Avoid caffeine.  · Avoid alcohol.  · Rest as needed.  Contact a health care provider if:  · You have new symptoms.  · You cough up  pus.  · Your cough does not get better after 2-3 weeks, or your cough gets worse.  · You cannot control your cough with suppressant medicines and you are losing sleep.  · You develop pain that is getting worse or pain that is not controlled with pain medicines.  · You have a fever.  · You have unexplained weight loss.  · You have night sweats.  Get help right away if:  · You cough up blood.  · You have difficulty breathing.  · Your heartbeat is very fast.  This information is not intended to replace advice given to you by your health care provider. Make sure you discuss any questions you have with your health care provider.  Document Released: 06/15/2012 Document Revised: 05/25/2017 Document Reviewed: 02/24/2016  [employer] Interactive Patient Education © 2017 [employer]

## 2019-05-25 LAB
ALBUMIN SERPL-MCNC: 4.2 G/DL (ref 3.6–4.8)
ALBUMIN/GLOB SERPL: 1.3 {RATIO} (ref 1.2–2.2)
ALP SERPL-CCNC: 73 IU/L (ref 39–117)
ALT SERPL-CCNC: 39 IU/L (ref 0–44)
AST SERPL-CCNC: 34 IU/L (ref 0–40)
BASOPHILS # BLD AUTO: 0 X10E3/UL (ref 0–0.2)
BASOPHILS NFR BLD AUTO: 1 %
BILIRUB SERPL-MCNC: 1.8 MG/DL (ref 0–1.2)
BUN SERPL-MCNC: 11 MG/DL (ref 8–27)
BUN/CREAT SERPL: 12 (ref 10–24)
CALCIUM SERPL-MCNC: 9.9 MG/DL (ref 8.6–10.2)
CHLORIDE SERPL-SCNC: 98 MMOL/L (ref 96–106)
CHOLEST SERPL-MCNC: 176 MG/DL (ref 100–199)
CO2 SERPL-SCNC: 25 MMOL/L (ref 20–29)
CREAT SERPL-MCNC: 0.93 MG/DL (ref 0.76–1.27)
EOSINOPHIL # BLD AUTO: 0.1 X10E3/UL (ref 0–0.4)
EOSINOPHIL NFR BLD AUTO: 2 %
ERYTHROCYTE [DISTWIDTH] IN BLOOD BY AUTOMATED COUNT: 14.5 % (ref 12.3–15.4)
GLOBULIN SER CALC-MCNC: 3.3 G/DL (ref 1.5–4.5)
GLUCOSE SERPL-MCNC: 87 MG/DL (ref 65–99)
HCT VFR BLD AUTO: 47.3 % (ref 37.5–51)
HDLC SERPL-MCNC: 49 MG/DL
HGB BLD-MCNC: 16.1 G/DL (ref 13–17.7)
IMM GRANULOCYTES # BLD AUTO: 0 X10E3/UL (ref 0–0.1)
IMM GRANULOCYTES NFR BLD AUTO: 0 %
IMMATURE CELLS  115398: NORMAL
LABORATORY COMMENT REPORT: ABNORMAL
LDLC SERPL CALC-MCNC: 106 MG/DL (ref 0–99)
LYMPHOCYTES # BLD AUTO: 2.4 X10E3/UL (ref 0.7–3.1)
LYMPHOCYTES NFR BLD AUTO: 29 %
MCH RBC QN AUTO: 31 PG (ref 26.6–33)
MCHC RBC AUTO-ENTMCNC: 34 G/DL (ref 31.5–35.7)
MCV RBC AUTO: 91 FL (ref 79–97)
MONOCYTES # BLD AUTO: 0.7 X10E3/UL (ref 0.1–0.9)
MONOCYTES NFR BLD AUTO: 8 %
MORPHOLOGY BLD-IMP: NORMAL
NEUTROPHILS # BLD AUTO: 4.9 X10E3/UL (ref 1.4–7)
NEUTROPHILS NFR BLD AUTO: 60 %
NRBC BLD AUTO-RTO: NORMAL %
PLATELET # BLD AUTO: 321 X10E3/UL (ref 150–450)
POTASSIUM SERPL-SCNC: 3.1 MMOL/L (ref 3.5–5.2)
PROT SERPL-MCNC: 7.5 G/DL (ref 6–8.5)
RBC # BLD AUTO: 5.19 X10E6/UL (ref 4.14–5.8)
SODIUM SERPL-SCNC: 142 MMOL/L (ref 134–144)
TRIGL SERPL-MCNC: 105 MG/DL (ref 0–149)
VLDLC SERPL CALC-MCNC: 21 MG/DL (ref 5–40)
WBC # BLD AUTO: 8.1 X10E3/UL (ref 3.4–10.8)

## 2019-12-02 ENCOUNTER — OFFICE VISIT (OUTPATIENT)
Dept: MEDICAL GROUP | Facility: PHYSICIAN GROUP | Age: 64
End: 2019-12-02
Payer: COMMERCIAL

## 2019-12-02 VITALS
DIASTOLIC BLOOD PRESSURE: 76 MMHG | HEART RATE: 78 BPM | SYSTOLIC BLOOD PRESSURE: 128 MMHG | TEMPERATURE: 98.8 F | BODY MASS INDEX: 29.95 KG/M2 | WEIGHT: 226 LBS | OXYGEN SATURATION: 95 % | HEIGHT: 73 IN

## 2019-12-02 DIAGNOSIS — I10 ESSENTIAL HYPERTENSION: ICD-10-CM

## 2019-12-02 DIAGNOSIS — E87.6 HYPOKALEMIA: ICD-10-CM

## 2019-12-02 DIAGNOSIS — Z11.59 NEED FOR HEPATITIS C SCREENING TEST: ICD-10-CM

## 2019-12-02 DIAGNOSIS — Z87.891 AGGRESSIVE EX-SMOKER: ICD-10-CM

## 2019-12-02 DIAGNOSIS — Z13.6 SCREENING FOR CARDIOVASCULAR CONDITION: ICD-10-CM

## 2019-12-02 DIAGNOSIS — Z12.12 SCREENING FOR COLORECTAL CANCER: ICD-10-CM

## 2019-12-02 DIAGNOSIS — Z12.5 ENCOUNTER FOR SCREENING FOR MALIGNANT NEOPLASM OF PROSTATE: ICD-10-CM

## 2019-12-02 DIAGNOSIS — M12.30 PALINDROMIC RHEUMATISM: ICD-10-CM

## 2019-12-02 DIAGNOSIS — Z23 NEED FOR VACCINATION: ICD-10-CM

## 2019-12-02 DIAGNOSIS — E78.5 HYPERLIPIDEMIA, UNSPECIFIED HYPERLIPIDEMIA TYPE: ICD-10-CM

## 2019-12-02 DIAGNOSIS — Z12.2 ENCOUNTER FOR SCREENING FOR MALIGNANT NEOPLASM OF RESPIRATORY ORGANS: ICD-10-CM

## 2019-12-02 DIAGNOSIS — Z12.11 SCREENING FOR COLORECTAL CANCER: ICD-10-CM

## 2019-12-02 DIAGNOSIS — R79.89 ABNORMAL LIVER FUNCTION TESTS: ICD-10-CM

## 2019-12-02 DIAGNOSIS — R05.3 COUGH, PERSISTENT: ICD-10-CM

## 2019-12-02 DIAGNOSIS — Z00.00 ROUTINE ADULT HEALTH MAINTENANCE: ICD-10-CM

## 2019-12-02 DIAGNOSIS — Z00.00 HEALTH CARE MAINTENANCE: ICD-10-CM

## 2019-12-02 PROBLEM — G47.30 SLEEP APNEA: Status: RESOLVED | Noted: 2017-03-27 | Resolved: 2019-12-02

## 2019-12-02 PROBLEM — E66.9 OBESITY (BMI 30-39.9): Status: RESOLVED | Noted: 2017-03-29 | Resolved: 2019-12-02

## 2019-12-02 PROBLEM — R77.1 ELEVATED SERUM GLOBULIN LEVEL: Status: RESOLVED | Noted: 2017-04-28 | Resolved: 2019-12-02

## 2019-12-02 PROBLEM — B35.1 ONYCHOMYCOSIS OF TOENAIL: Status: RESOLVED | Noted: 2017-04-28 | Resolved: 2019-12-02

## 2019-12-02 PROCEDURE — 90471 IMMUNIZATION ADMIN: CPT | Performed by: NURSE PRACTITIONER

## 2019-12-02 PROCEDURE — 99214 OFFICE O/P EST MOD 30 MIN: CPT | Mod: 25 | Performed by: NURSE PRACTITIONER

## 2019-12-02 PROCEDURE — 90715 TDAP VACCINE 7 YRS/> IM: CPT | Performed by: NURSE PRACTITIONER

## 2019-12-02 RX ORDER — LISINOPRIL AND HYDROCHLOROTHIAZIDE 20; 12.5 MG/1; MG/1
1 TABLET ORAL
Qty: 90 TAB | Refills: 1 | Status: SHIPPED | OUTPATIENT
Start: 2019-12-02 | End: 2020-02-24

## 2019-12-02 RX ORDER — POTASSIUM CHLORIDE 20 MEQ/1
20 TABLET, EXTENDED RELEASE ORAL 2 TIMES DAILY
Qty: 90 TAB | Refills: 0 | Status: SHIPPED | OUTPATIENT
Start: 2019-12-02 | End: 2022-04-14

## 2019-12-02 RX ORDER — SILDENAFIL CITRATE 20 MG/1
20 TABLET ORAL
COMMUNITY
End: 2019-12-08 | Stop reason: SDUPTHER

## 2019-12-02 RX ORDER — AMLODIPINE BESYLATE 5 MG/1
10 TABLET ORAL DAILY
Qty: 180 TAB | Refills: 1 | Status: SHIPPED | OUTPATIENT
Start: 2019-12-02 | End: 2020-05-11

## 2019-12-02 RX ORDER — ATORVASTATIN CALCIUM 40 MG/1
40 TABLET, FILM COATED ORAL DAILY
Qty: 90 TAB | Refills: 1 | Status: SHIPPED | OUTPATIENT
Start: 2019-12-02 | End: 2020-02-07 | Stop reason: SDUPTHER

## 2019-12-02 SDOH — HEALTH STABILITY: MENTAL HEALTH: HOW OFTEN DO YOU HAVE A DRINK CONTAINING ALCOHOL?: MONTHLY OR LESS

## 2019-12-02 SDOH — HEALTH STABILITY: MENTAL HEALTH: HOW MANY STANDARD DRINKS CONTAINING ALCOHOL DO YOU HAVE ON A TYPICAL DAY?: 1 OR 2

## 2019-12-03 NOTE — PROGRESS NOTES
Chief Complaint   Patient presents with   • Establish Care   • Medication Refill         Subjective:     Bladimir Mitchell is a 64 y.o. male presenting to establish care today.    Persistent cough: Patient has had several months of a persistent cough, occasionally productive with clear and white sputum.  Denies any systemic symptoms, fatigue/malaise/fever/chills.  Reports cough is worse in the morning and improves throughout the day.  Has previously taken Flonase and omeprazole without benefit.  Is taking an ACE inhibitor, however this patient has been on this for several years, with onset of cough is throughout 2019.  Does have an aggressive history of smoking, was up to a pack and half a day for 30 years.  Patient has quit, within the last 15 years.  Previously had a chest x-ray done in May, no abnormalities found.  Patient has never had lung function tests that he is aware.    Essential hypertension: Patient's blood pressures been well controlled with a combination of Norvasc, lisinopril-HCTZ.  His last labs showed depleted potassium at 3.1.  Currently does not have any symptoms of low potassium.    Hyperlipidemia: Patient has a history of hyperlipidemia, controlled well with atorvastatin.  Denies myalgias.    Abnormal liver function test: Patient had a high bilirubin on his last chemistry panel.  Infrequent drinker, only has 1-2 drinks per month.  Has not had hepatitis C screening.  Denies nausea vomiting diarrhea or bloating.    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 Mandel.  Has not seen him recently.  Previously taken prednisone and then an unknown RA drug.  Has not been medicated for this in several years, but reports no symptoms.    Patient due for second dose of Shingrix, waiting on [facility] to supply.  Due for Tdap, willing to do this at this visit.  Patient reports he had a colonoscopy 2 years ago at [facility] off of [facility].  Records to be obtained.      Review  "of systems:  See above.     Denies chest pain, shortness of breath, dizziness, severe headache, altered cognition, changes in bowel or bladder habits, decreased sensation, decreased strength, numbness or tingling.      Current Outpatient Medications:   •  sildenafil (REVATIO) 20 MG tablet, Take 20 mg by mouth 1 time daily as needed., Disp: , Rfl:   •  amLODIPine (NORVASC) 5 MG Tab, Take 2 Tabs by mouth every day., Disp: 180 Tab, Rfl: 1  •  atorvastatin (LIPITOR) 40 MG Tab, Take 1 Tab by mouth every day., Disp: 90 Tab, Rfl: 1  •  lisinopril-hydrochlorothiazide (PRINZIDE) 20-12.5 MG per tablet, Take 1 Tab by mouth every day., Disp: 90 Tab, Rfl: 1  •  potassium chloride SA (KDUR) 20 MEQ Tab CR, Take 1 Tab by mouth 2 times a day., Disp: 90 Tab, Rfl: 0    Allergies, past medical history, past surgical history, family history, social history reviewed and updated    Objective:     Vitals: /76 (BP Location: Left arm, Patient Position: Sitting, BP Cuff Size: Adult)   Pulse 78   Temp 37.1 °C (98.8 °F) (Temporal)   Ht 1.854 m (6' 1\")   Wt 102.5 kg (226 lb)   SpO2 95%   BMI 29.82 kg/m²   General: Alert, cooperative, dressed appropriately for weather / situation  HEENT: Normocephalic.  EOMI, no icterus or pallor.  Conjunctivae clear without erythema / irritation. External ears developed; Bilat TMs visualized; appear pearly without bulging, effusion, or erythema; crisp light reflex.  Bilateral nasal turbinates nonerythematous, nonedematous.  Oropharynx non-erythematous, mucous membranes moist; Tonsils grade 0  Neck supple, absent of cervical or supraclavicular lymphadenopathy.  Thyroid palpated, free of masses or goiter.   Heart: Regular rate and rhythm.  S1 and S2 normal.  No murmurs auscultated; no murmurs / bruits heard over bilateral carotids.  Bilateral radial pulses strong and equal.  Bilateral posterior tibial pulses strong and equal.  Respiratory: Normal respiratory effort.  Lung sounds mildly diminished " posterior bases bilaterally.  Clear airflow in upper lobes.  AP ratio 1:2   Abdomen: Non-distended;   Skin: Visible skin intact, dry, without rash.  Musculoskeletal: Gait is normal.  Bilateral  strength strong equal.  Moves extremities freely and equally bilaterally  Neuro:  AAOx3 Visual tracking intact, no nystagmus;   Psych:  Affect/mood is normal, judgement is good, memory is intact, grooming is appropriate.    Assessment/Plan:     Bladimir was seen today for establish care and medication refill.    Diagnoses and all orders for this visit:    Cough, persistent: Discussed potential etiologies of this.  Including GERD, ACE inhibitor related cough, COPD, allergies, viral bronchitis.  Due to patient's long history of smoking, reactive airway disease is more likely.  He has previously tried omeprazole, this did not resolve any symptoms.  He has been on ACE inhibitor for several years so this is and unlikely cause.  Is not experiencing hemoptysis, solely expectorating clear sputum with the occasional white and opaque bits.  Discussed that if patient's condition worsens or he feels to get into pulmonology within the next couple of months, he should return to clinic and we can do some pulmonary function tests.  -     REFERRAL TO PULMONOLOGY    Essential hypertension: Refills provided.  We will recheck labs within the next couple of months and follow-up with patient at the beginning part of 2020.  Added potassium for replenishment.  Discussed dietary sources of potassium.  -     amLODIPine (NORVASC) 5 MG Tab; Take 2 Tabs by mouth every day.  -     TSH WITH REFLEX TO FT4; Future  -     lisinopril-hydrochlorothiazide (PRINZIDE) 20-12.5 MG per tablet; Take 1 Tab by mouth every day.    Hyperlipidemia, unspecified hyperlipidemia type: Refills provided, this is well controlled.  Denies myalgias.  -     atorvastatin (LIPITOR) 40 MG Tab; Take 1 Tab by mouth every day.    Hypokalemia  -     potassium chloride SA (KDUR) 20 MEQ  Tab CR; Take 1 Tab by mouth 2 times a day.    Aggressive ex-smoker  -     REFERRAL TO PULMONOLOGY    Abnormal liver function tests  -     Comp Metabolic Panel; Future    Palindromic rheumatism: Advised patient to follow-up with Dr. Mandel in order to maintain therapeutic relationship.  Discussed the long wait time to get into rheumatology with a new referral if needed.  Recommended he discuss the stopping of his previous artery regimen with Dr. Mandel and discussed care plan.    Need for vaccination: Discussed risk benefits and alternatives vaccination, patient denies any past severe adverse effects.  Consented to vaccination.  -     Tdap Vaccine =>8YO IM:    Routine adult health maintenance  -     VITAMIN B12; Future  -     CBC WITH DIFFERENTIAL; Future  -     Comp Metabolic Panel; Future  -     HEMOGLOBIN A1C; Future  -     HEP C VIRUS ANTIBODY; Future  -     VITAMIN D,25 HYDROXY; Future  -     TSH WITH REFLEX TO FT4; Future  -     Lipid Profile; Future  -     PROSTATE SPECIFIC AG SCREENING; Future    Screening for cardiovascular condition  -     HEMOGLOBIN A1C; Future  -     TSH WITH REFLEX TO FT4; Future  -     Lipid Profile; Future    Need for hepatitis C screening test  -     HEP C VIRUS ANTIBODY; Future    Encounter for screening for malignant neoplasm of prostate  -     PROSTATE SPECIFIC AG SCREENING; Future  -     REFERRAL TO LUNG CANCER SCREENING PROGRAM    Encounter for screening for malignant neoplasm of respiratory organs    Health care maintenance        Return in about 3 months (around 3/2/2020).    Patient verbalized understanding and agreed to plan of care.  Encouraged to contact me with needs via [employer] or by phone if needed.      I have placed the above orders and discussed them with an approved delegating provider.  The MA is performing the below orders under the direction of Dr. Blood    Please note that this dictation was created using voice recognition software. I have made every reasonable  attempt to correct obvious errors, but I expect that there are errors of grammar and possibly content that I did not discover before finalizing the note.

## 2019-12-05 ENCOUNTER — TELEPHONE (OUTPATIENT)
Dept: HEMATOLOGY ONCOLOGY | Facility: MEDICAL CENTER | Age: 64
End: 2019-12-05

## 2019-12-06 ENCOUNTER — HOSPITAL ENCOUNTER (OUTPATIENT)
Dept: LAB | Facility: MEDICAL CENTER | Age: 64
End: 2019-12-06
Attending: NURSE PRACTITIONER
Payer: COMMERCIAL

## 2019-12-06 DIAGNOSIS — Z00.00 ROUTINE ADULT HEALTH MAINTENANCE: ICD-10-CM

## 2019-12-06 DIAGNOSIS — Z13.6 SCREENING FOR CARDIOVASCULAR CONDITION: ICD-10-CM

## 2019-12-06 DIAGNOSIS — I10 ESSENTIAL HYPERTENSION: ICD-10-CM

## 2019-12-06 DIAGNOSIS — Z12.5 ENCOUNTER FOR SCREENING FOR MALIGNANT NEOPLASM OF PROSTATE: ICD-10-CM

## 2019-12-06 DIAGNOSIS — Z11.59 NEED FOR HEPATITIS C SCREENING TEST: ICD-10-CM

## 2019-12-06 DIAGNOSIS — R79.89 ABNORMAL LIVER FUNCTION TESTS: ICD-10-CM

## 2019-12-06 LAB
25(OH)D3 SERPL-MCNC: 22 NG/ML (ref 30–100)
ALBUMIN SERPL BCP-MCNC: 4 G/DL (ref 3.2–4.9)
ALBUMIN/GLOB SERPL: 1.3 G/DL
ALP SERPL-CCNC: 69 U/L (ref 30–99)
ALT SERPL-CCNC: 20 U/L (ref 2–50)
ANION GAP SERPL CALC-SCNC: 8 MMOL/L (ref 0–11.9)
AST SERPL-CCNC: 17 U/L (ref 12–45)
BASOPHILS # BLD AUTO: 0.7 % (ref 0–1.8)
BASOPHILS # BLD: 0.05 K/UL (ref 0–0.12)
BILIRUB SERPL-MCNC: 1.5 MG/DL (ref 0.1–1.5)
BUN SERPL-MCNC: 12 MG/DL (ref 8–22)
CALCIUM SERPL-MCNC: 9.2 MG/DL (ref 8.5–10.5)
CHLORIDE SERPL-SCNC: 104 MMOL/L (ref 96–112)
CHOLEST SERPL-MCNC: 154 MG/DL (ref 100–199)
CO2 SERPL-SCNC: 30 MMOL/L (ref 20–33)
CREAT SERPL-MCNC: 0.79 MG/DL (ref 0.5–1.4)
EOSINOPHIL # BLD AUTO: 0.15 K/UL (ref 0–0.51)
EOSINOPHIL NFR BLD: 2.1 % (ref 0–6.9)
ERYTHROCYTE [DISTWIDTH] IN BLOOD BY AUTOMATED COUNT: 41.8 FL (ref 35.9–50)
EST. AVERAGE GLUCOSE BLD GHB EST-MCNC: 120 MG/DL
FASTING STATUS PATIENT QL REPORTED: NORMAL
GLOBULIN SER CALC-MCNC: 3.2 G/DL (ref 1.9–3.5)
GLUCOSE SERPL-MCNC: 93 MG/DL (ref 65–99)
HBA1C MFR BLD: 5.8 % (ref 0–5.6)
HCT VFR BLD AUTO: 48.2 % (ref 42–52)
HCV AB SER QL: NEGATIVE
HDLC SERPL-MCNC: 38 MG/DL
HGB BLD-MCNC: 17 G/DL (ref 14–18)
IMM GRANULOCYTES # BLD AUTO: 0.01 K/UL (ref 0–0.11)
IMM GRANULOCYTES NFR BLD AUTO: 0.1 % (ref 0–0.9)
LDLC SERPL CALC-MCNC: 100 MG/DL
LYMPHOCYTES # BLD AUTO: 1.44 K/UL (ref 1–4.8)
LYMPHOCYTES NFR BLD: 20.5 % (ref 22–41)
MCH RBC QN AUTO: 31.5 PG (ref 27–33)
MCHC RBC AUTO-ENTMCNC: 35.3 G/DL (ref 33.7–35.3)
MCV RBC AUTO: 89.4 FL (ref 81.4–97.8)
MONOCYTES # BLD AUTO: 0.52 K/UL (ref 0–0.85)
MONOCYTES NFR BLD AUTO: 7.4 % (ref 0–13.4)
NEUTROPHILS # BLD AUTO: 4.86 K/UL (ref 1.82–7.42)
NEUTROPHILS NFR BLD: 69.2 % (ref 44–72)
NRBC # BLD AUTO: 0 K/UL
NRBC BLD-RTO: 0 /100 WBC
PLATELET # BLD AUTO: 302 K/UL (ref 164–446)
PMV BLD AUTO: 9.2 FL (ref 9–12.9)
POTASSIUM SERPL-SCNC: 3.1 MMOL/L (ref 3.6–5.5)
PROT SERPL-MCNC: 7.2 G/DL (ref 6–8.2)
PSA SERPL-MCNC: 0.35 NG/ML (ref 0–4)
RBC # BLD AUTO: 5.39 M/UL (ref 4.7–6.1)
SODIUM SERPL-SCNC: 142 MMOL/L (ref 135–145)
TRIGL SERPL-MCNC: 80 MG/DL (ref 0–149)
TSH SERPL DL<=0.005 MIU/L-ACNC: 1.46 UIU/ML (ref 0.38–5.33)
VIT B12 SERPL-MCNC: 967 PG/ML (ref 211–911)
WBC # BLD AUTO: 7 K/UL (ref 4.8–10.8)

## 2019-12-06 PROCEDURE — 36415 COLL VENOUS BLD VENIPUNCTURE: CPT

## 2019-12-06 PROCEDURE — 80061 LIPID PANEL: CPT

## 2019-12-06 PROCEDURE — 86803 HEPATITIS C AB TEST: CPT

## 2019-12-06 PROCEDURE — 82306 VITAMIN D 25 HYDROXY: CPT

## 2019-12-06 PROCEDURE — 84443 ASSAY THYROID STIM HORMONE: CPT

## 2019-12-06 PROCEDURE — 84153 ASSAY OF PSA TOTAL: CPT

## 2019-12-06 PROCEDURE — 80053 COMPREHEN METABOLIC PANEL: CPT

## 2019-12-06 PROCEDURE — 82607 VITAMIN B-12: CPT

## 2019-12-06 PROCEDURE — 83036 HEMOGLOBIN GLYCOSYLATED A1C: CPT

## 2019-12-06 PROCEDURE — 85025 COMPLETE CBC W/AUTO DIFF WBC: CPT

## 2019-12-10 RX ORDER — SILDENAFIL CITRATE 20 MG/1
TABLET ORAL
Qty: 30 TAB | Refills: 0 | Status: SHIPPED | OUTPATIENT
Start: 2019-12-10 | End: 2020-05-19

## 2019-12-10 NOTE — TELEPHONE ENCOUNTER
Was the patient seen in the last year in this department? Yes lov 12/2/19    Does patient have an active prescription for medications requested? No     Received Request Via: Pharmacy

## 2020-02-07 ENCOUNTER — OFFICE VISIT (OUTPATIENT)
Dept: MEDICAL GROUP | Facility: PHYSICIAN GROUP | Age: 65
End: 2020-02-07
Payer: COMMERCIAL

## 2020-02-07 VITALS
BODY MASS INDEX: 29.08 KG/M2 | DIASTOLIC BLOOD PRESSURE: 88 MMHG | HEIGHT: 73 IN | OXYGEN SATURATION: 94 % | WEIGHT: 219.4 LBS | HEART RATE: 93 BPM | SYSTOLIC BLOOD PRESSURE: 132 MMHG | RESPIRATION RATE: 14 BRPM | TEMPERATURE: 99.7 F

## 2020-02-07 DIAGNOSIS — L53.9 REDNESS OF SKIN: ICD-10-CM

## 2020-02-07 DIAGNOSIS — E78.5 HYPERLIPIDEMIA, UNSPECIFIED HYPERLIPIDEMIA TYPE: ICD-10-CM

## 2020-02-07 DIAGNOSIS — E87.6 HYPOKALEMIA: ICD-10-CM

## 2020-02-07 DIAGNOSIS — K59.03 DRUG-INDUCED CONSTIPATION: ICD-10-CM

## 2020-02-07 DIAGNOSIS — R19.8 BORBORYGMI: ICD-10-CM

## 2020-02-07 PROCEDURE — 99214 OFFICE O/P EST MOD 30 MIN: CPT | Performed by: NURSE PRACTITIONER

## 2020-02-07 RX ORDER — ATORVASTATIN CALCIUM 40 MG/1
40 TABLET, FILM COATED ORAL DAILY
Qty: 90 TAB | Refills: 3 | Status: SHIPPED | OUTPATIENT
Start: 2020-02-07

## 2020-02-07 RX ORDER — FLUTICASONE PROPIONATE 50 MCG
SPRAY, SUSPENSION (ML) NASAL
COMMUNITY
Start: 2019-08-24 | End: 2020-11-03

## 2020-02-07 ASSESSMENT — PATIENT HEALTH QUESTIONNAIRE - PHQ9: CLINICAL INTERPRETATION OF PHQ2 SCORE: 0

## 2020-02-08 NOTE — PROGRESS NOTES
Chief Complaint   Patient presents with   • Medication Refill     atorvastatin (LIPITOR) 40 MG Tab   • Referral Needed     Pulmonology   • GI Problem     acid issues   • Constipation         Subjective:     Bladimir Mitchell is a 64 y.o. male presenting with GI issues.  He also like to discuss some chronic skin flushing.    GI upset: Patient has been taking 20 mEq of potassium chloride twice a day for roughly 2 months, throughout this time is experienced increased episodes of constipation, loud stomach gurgling, and reflux.  He has decreased his potassium intake to 20 mEq once a day.  He does admit sometimes he takes this on an empty stomach.  He feels this is related.  He has began taking daily Metamucil fiber supplement and reports this is helped dramatically.  Denies any changes in color, character of stool.  Does have a history of cholecystectomy.    Hypokalemia: Last potassium 3.1, retest indicated.  Patient has been taking 20 mEq potassium daily.    Skin redness/flushing: Patient reports this is been a chronic problem, extending several years in the past.  Does not hurt, no skin abrasions.  This occurs randomly and generally resolves.    Hyperlipidemia: Patient on 40 mg atorvastatin, taking diligently.  Lives an active lifestyle and is overall tried to eat a healthy diet and reduce amounts of unhealthy food such as saturated fat and excessive alcohol intake.  Denies myalgias.  Requesting refill today.  Review of systems:      Denies chest pain, shortness of breath, sore throat, difficulty swallowing, new cough, dizziness, severe headache, altered cognition, changes bladder habits, decreased sensation, decreased strength, numbness or tingling, intolerable depression or anxiety, rash  changes in vision, fatigue, myalgias, painful or swollen lymph nodes.       Current Outpatient Medications:   •  atorvastatin (LIPITOR) 40 MG Tab, Take 1 Tab by mouth every day., Disp: 90 Tab, Rfl: 3  •  sildenafil (REVATIO)  "20 MG tablet, TAKE 3-5 TABLETS BY MOUTH 1 HOUR PRIOR TO ACTIVITY, Disp: 30 Tab, Rfl: 0  •  amLODIPine (NORVASC) 5 MG Tab, Take 2 Tabs by mouth every day., Disp: 180 Tab, Rfl: 1  •  lisinopril-hydrochlorothiazide (PRINZIDE) 20-12.5 MG per tablet, Take 1 Tab by mouth every day., Disp: 90 Tab, Rfl: 1  •  potassium chloride SA (KDUR) 20 MEQ Tab CR, Take 1 Tab by mouth 2 times a day. (Patient taking differently: Take 20 mEq by mouth every day.), Disp: 90 Tab, Rfl: 0  •  fluticasone (FLONASE) 50 MCG/ACT nasal spray, FLUTICASONE PROPIONATE 50 MCG/ACT SUSP, Disp: , Rfl:     Allergies, past medical history, past surgical history, family history, social history reviewed and updated    Objective:     Vitals: /88 (BP Location: Left arm, Patient Position: Sitting, BP Cuff Size: Adult)   Pulse 93   Temp 37.6 °C (99.7 °F) (Temporal)   Resp 14   Ht 1.854 m (6' 1\")   Wt 99.5 kg (219 lb 6.4 oz)   SpO2 94%   BMI 28.95 kg/m²   General: Alert, cooperative, dressed appropriately for weather / situation  Eyes:Normocephalic.  EOMI, no icterus or pallor.  Conjunctivae clear without erythema / irritation.  ENT:  External ears developed;     Lymph: Neck supple, absent of cervical or supraclavicular lymphadenopathy.    Heart: Regular rate and rhythm.  S1 and S2 normal.  No murmurs auscultated; no murmurs / bruits heard over bilateral carotids.  Bilateral radial pulses strong and equal.  Bilateral posterior tibial pulses strong and equal.  Respiratory: Normal respiratory effort.  Clear to auscultation bilaterally.  AP ratio 1:2   Abdomen: Normoactive bowel sounds, nontender with deep palpation throughout all quadrants.  Non-distended; no palpable organomegaly present.  Skin: Visible skin intact, dry, without rash.  Blanchable redness observed bilateral hands, no abrasions or swelling noted.  Musculoskeletal: Gait is normal.  Moves extremities freely and equally bilaterally  Neuro:  AAOx3 Visual tracking intact, no nystagmus; "   Psych:  Affect/mood is normal, judgement is good, memory is intact, grooming is appropriate.    Assessment/Plan:     Bladimir was seen today for medication refill, referral needed, gi problem and constipation.    Diagnoses and all orders for this visit:    Regarding constipation and not loud stomach gurgling, I think this is associated with his potassium use.  He can continue to take it once daily but I advised him to take it with a substantial meal or right after with a lot of water.  We will recheck his BMP along with a magnesium level to evaluate potassium levels.  Provided him a sheet of potassium rich foods and encouraged him to incorporate these into his day-to-day life routinely.    The flushing may be due to the multiple vasodilating medications the patient has been on.  Fortunately, is not causing him any unpleasant symptoms.  We discussed that we may consider changing his antihypertensive regimen as he is in the high normal range and may be experiencing excessive flushing due to the medications.  HCTZ is also potassium wasting and on 2 separate occasions he has had a low potassium level.    I will follow-up with him regarding these results and we will discuss possible hypertensive plan change.      Hypokalemia  -     Basic Metabolic Panel; Future  -     MAGNESIUM; Future    Hyperlipidemia, unspecified hyperlipidemia type  -     atorvastatin (LIPITOR) 40 MG Tab; Take 1 Tab by mouth every day.    Redness of skin    Drug-induced constipation    Borb[place]gmi          Return in about 6 months (around 8/7/2020).    Patient verbalized understanding and agreed to plan of care.  Encouraged to contact me with needs via [employer] or by phone if needed.      I have placed the above orders and discussed them with an approved delegating provider.  The MA is performing the below orders under the direction of Dr Ogden    Please note that this dictation was created using voice recognition software. I have made every  reasonable attempt to correct obvious errors, but I expect that there are errors of grammar and possibly content that I did not discover before finalizing the note.

## 2020-02-22 LAB
BUN SERPL-MCNC: 15 MG/DL (ref 8–27)
BUN/CREAT SERPL: 16 (ref 10–24)
CALCIUM SERPL-MCNC: 10 MG/DL (ref 8.6–10.2)
CHLORIDE SERPL-SCNC: 99 MMOL/L (ref 96–106)
CO2 SERPL-SCNC: 25 MMOL/L (ref 20–29)
CREAT SERPL-MCNC: 0.91 MG/DL (ref 0.76–1.27)
GLUCOSE SERPL-MCNC: 102 MG/DL (ref 65–99)
MAGNESIUM SERPL-MCNC: 2.3 MG/DL (ref 1.6–2.3)
POTASSIUM SERPL-SCNC: 3.4 MMOL/L (ref 3.5–5.2)
SODIUM SERPL-SCNC: 140 MMOL/L (ref 134–144)

## 2020-02-24 ENCOUNTER — TELEPHONE (OUTPATIENT)
Dept: MEDICAL GROUP | Facility: PHYSICIAN GROUP | Age: 65
End: 2020-02-24

## 2020-02-24 DIAGNOSIS — I10 ESSENTIAL HYPERTENSION: ICD-10-CM

## 2020-02-24 RX ORDER — LOSARTAN POTASSIUM 50 MG/1
50 TABLET ORAL DAILY
Qty: 30 TAB | Refills: 2 | Status: SHIPPED | OUTPATIENT
Start: 2020-02-24 | End: 2020-03-24

## 2020-02-24 NOTE — PROGRESS NOTES
Patient persistently hypokalemic despite increasing potassium rich foods in diet and taking 20 mEq supplement (was unable to tolerate oral 40 mEq supplement).  That along with persistent dry cough, warrants changing blood pressure regimen from the ACE inhibitor and diuretic to arb.  Patient instructed to change her regimen and check blood pressure twice daily and follow-up with results.

## 2020-02-25 NOTE — TELEPHONE ENCOUNTER
----- Message from ADRYAN Teran sent at 2/24/2020  2:55 PM PST -----  Please call patient and let them know his potassium is still low, though it is increasing gradually.  Have him continue his potassium supplement and increasing his potassium rich food diet.  I would like to switch his blood pressure medicine because I think the diuretic and it is negatively impacting his potassium level.  I sent in a prescription for losartan 50 mg, he is to start taking this daily instead of his current lisinopril–HCTZ.  If he could please take his blood pressure twice daily and record it and return to clinic in the next couple of weeks, that would be great.  Thanks, Josi JAMISON

## 2020-03-22 DIAGNOSIS — I10 ESSENTIAL HYPERTENSION: ICD-10-CM

## 2020-03-24 RX ORDER — LOSARTAN POTASSIUM 50 MG/1
TABLET ORAL
Qty: 90 TAB | Refills: 1 | Status: SHIPPED | OUTPATIENT
Start: 2020-03-24 | End: 2020-09-22

## 2020-03-24 NOTE — TELEPHONE ENCOUNTER
Refill X 6 months, sent to pharmacy.Pt. Seen in the last 6 months per protocol.   Lab Results   Component Value Date/Time    SODIUM 140 02/21/2020 05:36 AM    SODIUM 142 12/06/2019 07:20 AM    POTASSIUM 3.4 (L) 02/21/2020 05:36 AM    POTASSIUM 3.1 (L) 12/06/2019 07:20 AM    CHLORIDE 99 02/21/2020 05:36 AM    CHLORIDE 104 12/06/2019 07:20 AM    CO2 25 02/21/2020 05:36 AM    CO2 30 12/06/2019 07:20 AM    GLUCOSE 102 (H) 02/21/2020 05:36 AM    GLUCOSE 93 12/06/2019 07:20 AM    BUN 15 02/21/2020 05:36 AM    BUN 12 12/06/2019 07:20 AM    CREATININE 0.91 02/21/2020 05:36 AM    CREATININE 0.79 12/06/2019 07:20 AM    BUNCREATRAT 16 02/21/2020 05:36 AM

## 2020-04-03 ENCOUNTER — OFFICE VISIT (OUTPATIENT)
Dept: PULMONOLOGY | Facility: HOSPICE | Age: 65
End: 2020-04-03
Payer: COMMERCIAL

## 2020-04-03 VITALS
TEMPERATURE: 98.6 F | WEIGHT: 210 LBS | DIASTOLIC BLOOD PRESSURE: 88 MMHG | RESPIRATION RATE: 16 BRPM | BODY MASS INDEX: 27.83 KG/M2 | HEART RATE: 69 BPM | HEIGHT: 73 IN | OXYGEN SATURATION: 97 % | SYSTOLIC BLOOD PRESSURE: 140 MMHG

## 2020-04-03 DIAGNOSIS — Z87.891 FORMER TOBACCO USE: ICD-10-CM

## 2020-04-03 DIAGNOSIS — R05.9 COUGH: ICD-10-CM

## 2020-04-03 PROCEDURE — 99203 OFFICE O/P NEW LOW 30 MIN: CPT | Performed by: INTERNAL MEDICINE

## 2020-04-03 ASSESSMENT — ENCOUNTER SYMPTOMS
PHOTOPHOBIA: 0
HEARTBURN: 0
PND: 0
EYE DISCHARGE: 0
NAUSEA: 0
CHILLS: 0
WEAKNESS: 0
BLURRED VISION: 0
STRIDOR: 0
BACK PAIN: 0
SPEECH CHANGE: 0
ABDOMINAL PAIN: 0
CLAUDICATION: 0
SINUS PAIN: 0
SHORTNESS OF BREATH: 0
MYALGIAS: 0
DOUBLE VISION: 0
FEVER: 0
DIZZINESS: 0
HEADACHES: 0
SORE THROAT: 0
HEMOPTYSIS: 0
ORTHOPNEA: 0
VOMITING: 0
DIAPHORESIS: 0
WEIGHT LOSS: 0
SPUTUM PRODUCTION: 1
EYE PAIN: 0
TREMORS: 0
CONSTIPATION: 0
WHEEZING: 0
PALPITATIONS: 0
DIARRHEA: 0
EYE REDNESS: 0
DEPRESSION: 0
FALLS: 0
FOCAL WEAKNESS: 0
NECK PAIN: 0
COUGH: 1

## 2020-04-03 ASSESSMENT — FIBROSIS 4 INDEX: FIB4 SCORE: 0.79

## 2020-04-03 NOTE — PROGRESS NOTES
Chief Complaint   Patient presents with   • Establish Care     referral 12/2/19 QUETA Englanddebbie cough    • Results     CXR 4/22/19, pma 2/1/08       HPI: This patient is a 64 y.o. male presenting for evaluation of cough.  The patient's past medical history is significant for dyslipidemia currently controlled on medication, hypertension also medicated with variable control as BP in clinic today is 140/88 and he has been noted to be as high as 170/90 during visit to [facility] in 2008, rheumatoid arthritis involving small joints previously on prednisone and disease modifying antirheumatic drugs such as Enbrel but off all medication for at least 2 years.  He also has a diagnosis of obstructive sleep apnea for which she was treated with CPAP at a pressure of 12 cm of water when last seen by [facility] in 2008.  He denies ongoing use of CPAP therapy and tells me he had some weight loss after which he no longer had symptoms of excessive daytime sleepiness.  The patient is a former tobacco user with greater than 30-pack-year history and quit roughly 15 years ago.  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  The patient is adopted and does not know his family medical history.  He presents today for evaluation of cough present for roughly 1 year.  This occurs first thing in the morning and is productive of clear to white sputum.  Cough then clears up and he does not have recurrence throughout the day.  No associated wheezing or shortness of breath.  No chest pain.  No cough at night.  He denies symptoms of gastroesophageal reflux disease but does have some feeling of postnasal drip with mucus collection at the back of his throat.  He has tried Flonase in the past but use this intermittently and usually in the morning.  He denies sneezing or watery itchy eyes.  He is on lisinopril.  Chest x-ray from April 22, 2019 showed no acute cardiopulmonary abnormality.    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   • RA (rheumatoid arthritis) (HCC)    • Sleep apnea 3/27/2017       Social History     Socioeconomic History   • Marital status: Single     Spouse name: Not on file   • Number of children: Not on file   • Years of education: Not on file   • Highest education level: Not on file   Occupational History   • Not on file   Social Needs   • Financial resource strain: Not on file   • Food insecurity     Worry: Not on file     Inability: Not on file   • Transportation needs     Medical: Not on file     Non-medical: Not on file   Tobacco Use   • Smoking status: Former Smoker     Packs/day: 2.00     Years: 25.00     Pack years: 50.00     Types: Cigarettes     Last attempt to quit: 2005     Years since quitting: 15.2   • Smokeless tobacco: Never Used   Substance and Sexual Activity   • Alcohol use: Yes     Alcohol/week: 0.6 - 1.2 oz     Types: 1 - 2 Shots of liquor per week     Frequency: Monthly or less     Drinks per session: 1 or 2     Comment: min   • Drug use: No   • Sexual activity: Yes   Lifestyle   • Physical activity     Days per week: Not on file     Minutes per session: Not on file   • Stress: Not on file   Relationships   • Social connections     Talks on phone: Not on file     Gets together: Not on file     Attends Bahai service: Not on file     Active member of club or organization: Not on file     Attends meetings of clubs or organizations: Not on file     Relationship status: Not on file   • Intimate partner violence     Fear of current or ex partner: Not on file     Emotionally abused: Not on file     Physically abused: Not on file     Forced sexual activity: Not on file   Other  Topics Concern   • Not on file   Social History Narrative   • Not on file       Family History   Problem Relation Age of Onset   • No Known Problems Mother    • No Known Problems Father    • No Known Problems Maternal Grandmother    • No Known Problems Maternal Grandfather    • No Known Problems Paternal Grandmother    • No Known Problems Paternal Grandfather        Current Outpatient Medications on File Prior to Visit   Medication Sig Dispense Refill   • losartan (COZAAR) 50 MG Tab TAKE 1 TABLET BY MOUTH EVERY DAY 90 Tab 1   • atorvastatin (LIPITOR) 40 MG Tab Take 1 Tab by mouth every day. 90 Tab 3   • amLODIPine (NORVASC) 5 MG Tab Take 2 Tabs by mouth every day. 180 Tab 1   • potassium chloride SA (KDUR) 20 MEQ Tab CR Take 1 Tab by mouth 2 times a day. 90 Tab 0   • fluticasone (FLONASE) 50 MCG/ACT nasal spray FLUTICASONE PROPIONATE 50 MCG/ACT SUSP     • sildenafil (REVATIO) 20 MG tablet TAKE 3-5 TABLETS BY MOUTH 1 HOUR PRIOR TO ACTIVITY (Patient not taking: Reported on 4/3/2020) 30 Tab 0     No current facility-administered medications on file prior to visit.        Allergies: Patient has no known allergies.    ROS:   Review of Systems   Constitutional: Negative for chills, diaphoresis, fever, malaise/fatigue and weight loss.   HENT: Negative for congestion, ear discharge, ear pain, hearing loss, nosebleeds, sinus pain, sore throat and tinnitus.    Eyes: Negative for blurred vision, double vision, photophobia, pain, discharge and redness.   Respiratory: Positive for cough and sputum production. Negative for hemoptysis, shortness of breath, wheezing and stridor.    Cardiovascular: Negative for chest pain, palpitations, orthopnea, claudication, leg swelling and PND.   Gastrointestinal: Negative for abdominal pain, constipation, diarrhea, heartburn, nausea and vomiting.   Genitourinary: Negative for dysuria and urgency.   Musculoskeletal: Negative for back pain, falls, joint pain, myalgias and neck pain.   Skin:  "Negative for itching and rash.   Neurological: Negative for dizziness, tremors, speech change, focal weakness, weakness and headaches.   Endo/Heme/Allergies: Negative for environmental allergies.   Psychiatric/Behavioral: Negative for depression.       /88 (BP Location: Left arm, Patient Position: Sitting, BP Cuff Size: Large adult)   Pulse 69   Temp 37 °C (98.6 °F) (Temporal)   Resp 16   Ht 1.854 m (6' 1\")   Wt 95.3 kg (210 lb)   SpO2 97%     Physical Exam:  Physical Exam  Vitals signs reviewed.   Constitutional:       General: He is not in acute distress.     Appearance: Normal appearance. He is normal weight.   HENT:      Head: Normocephalic and atraumatic.      Right Ear: External ear normal.      Left Ear: External ear normal.      Nose: Nose normal. No congestion.      Mouth/Throat:      Mouth: Mucous membranes are moist.      Pharynx: Oropharynx is clear. No oropharyngeal exudate.   Eyes:      General: No scleral icterus.     Extraocular Movements: Extraocular movements intact.      Conjunctiva/sclera: Conjunctivae normal.      Pupils: Pupils are equal, round, and reactive to light.   Neck:      Musculoskeletal: Normal range of motion and neck supple.   Cardiovascular:      Rate and Rhythm: Normal rate and regular rhythm.      Heart sounds: Normal heart sounds. No murmur. No gallop.    Pulmonary:      Effort: Pulmonary effort is normal. No respiratory distress.      Breath sounds: Normal breath sounds. No wheezing or rales.   Abdominal:      General: There is no distension.      Palpations: Abdomen is soft.   Musculoskeletal: Normal range of motion.      Right lower leg: No edema.      Left lower leg: No edema.   Skin:     General: Skin is warm and dry.      Findings: No rash.   Neurological:      Mental Status: He is alert and oriented to person, place, and time.      Cranial Nerves: No cranial nerve deficit.   Psychiatric:         Mood and Affect: Mood normal.         Behavior: Behavior normal. "         PFTs as reviewed by me personally: None    Imaging as reviewed by me personally: As per HPI    Assessment:  1. Cough  PULMONARY FUNCTION TESTS -Test requested: Complete Pulmonary Function Test   2. Former tobacco use         Plan:  1.  This is chronic and symptoms are most consistent with postnasal drip.  We discussed another trial of intranasal steroids with fluticasone.  I advised him to take 2 sprays each nostril at night before bed and to use daily for at least a week in order to truly see if there is an effect on his symptoms.  We also discussed use of potential over-the-counter, second-generation antihistamine.  I have low suspicion for COPD given normal SaO2, tobacco free for 15 years and no clinical history of symptoms to suggest COPD.  We will obtain pulmonary function test at follow-up in 3 months when we reassess symptoms.  2.  Patient is tobacco free.  I encouraged ongoing abstinence.  He does not qualify for lung cancer screening given length of time for which he has been tobacco free.  Return in about 3 months (around 7/3/2020) for pulmonary function test.

## 2020-05-08 DIAGNOSIS — I10 ESSENTIAL HYPERTENSION: ICD-10-CM

## 2020-05-11 RX ORDER — AMLODIPINE BESYLATE 5 MG/1
TABLET ORAL
Qty: 180 TAB | Refills: 0 | Status: SHIPPED | OUTPATIENT
Start: 2020-05-11 | End: 2020-05-19 | Stop reason: SDUPTHER

## 2020-05-11 NOTE — TELEPHONE ENCOUNTER
Was the patient seen in the last year in this department? Yes    Does patient have an active prescription for medications requested? No     Received Request Via: Pharmacy      Pt met protocol?: Yes, OV 2/20, out next month   BP Readings from Last 1 Encounters:   04/03/20 140/88

## 2020-05-19 ENCOUNTER — OFFICE VISIT (OUTPATIENT)
Dept: MEDICAL GROUP | Facility: PHYSICIAN GROUP | Age: 65
End: 2020-05-19
Payer: COMMERCIAL

## 2020-05-19 VITALS
BODY MASS INDEX: 28.1 KG/M2 | DIASTOLIC BLOOD PRESSURE: 88 MMHG | TEMPERATURE: 99.2 F | OXYGEN SATURATION: 94 % | WEIGHT: 212 LBS | HEART RATE: 71 BPM | SYSTOLIC BLOOD PRESSURE: 162 MMHG | HEIGHT: 73 IN

## 2020-05-19 DIAGNOSIS — E55.9 VITAMIN D DEFICIENCY: ICD-10-CM

## 2020-05-19 DIAGNOSIS — E87.6 HYPOKALEMIA: ICD-10-CM

## 2020-05-19 DIAGNOSIS — R19.8 BORBORYGMI: ICD-10-CM

## 2020-05-19 DIAGNOSIS — I10 ESSENTIAL HYPERTENSION: ICD-10-CM

## 2020-05-19 DIAGNOSIS — R63.4 WEIGHT LOSS: ICD-10-CM

## 2020-05-19 DIAGNOSIS — K59.03 DRUG-INDUCED CONSTIPATION: ICD-10-CM

## 2020-05-19 PROCEDURE — 99214 OFFICE O/P EST MOD 30 MIN: CPT | Performed by: NURSE PRACTITIONER

## 2020-05-19 RX ORDER — AMLODIPINE BESYLATE 10 MG/1
10 TABLET ORAL DAILY
Qty: 90 TAB | Refills: 3 | Status: SHIPPED | OUTPATIENT
Start: 2020-05-19 | End: 2021-12-01

## 2020-05-19 RX ORDER — POLYETHYLENE GLYCOL 3350 17 G/17G
17 POWDER, FOR SOLUTION ORAL DAILY
Qty: 3 BOTTLE | Refills: 3 | Status: SHIPPED | OUTPATIENT
Start: 2020-05-19 | End: 2022-04-14

## 2020-05-19 RX ORDER — ASPIRIN 81 MG/1
81 TABLET ORAL DAILY
Qty: 90 TAB | Refills: 3 | Status: SHIPPED | OUTPATIENT
Start: 2020-05-19 | End: 2022-04-14

## 2020-05-19 ASSESSMENT — FIBROSIS 4 INDEX: FIB4 SCORE: 0.79

## 2020-05-20 NOTE — PROGRESS NOTES
"Chief Complaint   Patient presents with   • Constipation     x 2 mo    • Medication Refill     amalodapine          Subjective:     Bladimir Mitchell is a 64 y.o. male presenting with constipation.    This is a chronic issue.  Patient on potassium supplementation, since starting this he has had a lot of stomach gurgling and intermittent constipation.  He does report that when he takes OTC Dulcolax he does experience relief.  He has not had a bowel movement in 2 days.  He reports without the stool softeners, his stools are quite large and difficult to pass.    History of hypokalemia.  We need to recheck this, he has been on potassium supplements for several months.  We stopped his diuretic to limit potassium waist.  Blood pressure now stable on amlodipine.  He reports he ran out and did not take it today so he presents hypertensive.  Checks his blood pressure at home and reports he has been normal limits routinely.    Previously seen by GI for screening colonoscopy, patient reports 2 years ago.  Reports was normal and recall is 10 years.  Records been requested but not yet received.  Denies blood in stool, black tarry stool, abdominal pain, nausea, diarrhea.    He does report some concern of weight loss, reports that previously if he would \"look at food he would gain weight\", but now it is difficult to keep weight on.  He is unsure about his weight at home.  Based on readings taken in the clinic, he is lost roughly 9 pounds in the past 6 months.  However weight is variable based on clothing.            Review of systems:      Denies chest pain, shortness of breath, sore throat, difficulty swallowing, new cough, changes in bowel habits, fatigue, myalgias, painful or swollen lymph nodes.       Current Outpatient Medications:   •  polyethylene glycol 3350 (MIRALAX) Powder, Take 17 g by mouth every day., Disp: 3 Bottle, Rfl: 3  •  amLODIPine (NORVASC) 10 MG Tab, Take 1 Tab by mouth every day., Disp: 90 Tab, Rfl: 3  • " " aspirin 81 MG EC tablet, Take 1 Tab by mouth every day., Disp: 90 Tab, Rfl: 3  •  losartan (COZAAR) 50 MG Tab, TAKE 1 TABLET BY MOUTH EVERY DAY, Disp: 90 Tab, Rfl: 1  •  fluticasone (FLONASE) 50 MCG/ACT nasal spray, FLUTICASONE PROPIONATE 50 MCG/ACT SUSP, Disp: , Rfl:   •  atorvastatin (LIPITOR) 40 MG Tab, Take 1 Tab by mouth every day., Disp: 90 Tab, Rfl: 3  •  potassium chloride SA (KDUR) 20 MEQ Tab CR, Take 1 Tab by mouth 2 times a day., Disp: 90 Tab, Rfl: 0    Allergies, past medical history, past surgical history, family history, social history reviewed and updated    Objective:     Vitals: BP (!) 162/88 (BP Location: Right arm, Patient Position: Sitting, BP Cuff Size: Adult)   Pulse 71   Temp 37.3 °C (99.2 °F) (Temporal)   Ht 1.854 m (6' 1\")   Wt 96.2 kg (212 lb)   SpO2 94%   BMI 27.97 kg/m²   General: Alert, cooperative, dressed appropriately for weather / situation  Eyes:Normocephalic.  EOMI, no icterus or pallor.  Conjunctivae clear without erythema / irritation.  ENT:  External ears developed;   Abdomen: Non-distended; Fullness palpated in bilateral upper quadrants.  Hyperactive bowel sounds.  Mild tenderness with deep palpation. No palpable organomegaly.  Skin: Visible skin intact, dry, without rash.  Musculoskeletal: Gait is normal.  Bilateral  strength strong equal.  Moves extremities freely and equally bilaterally  Neuro:  AAOx3 Visual tracking intact, no nystagmus;   Psych:  Affect/mood is normal, judgement is good, memory is intact, grooming is appropriate.    Assessment/Plan:     Bladimir was seen today for constipation and medication refill.    Diagnoses and all orders for this visit:    Drug-induced constipation: Advised patient to use 1 capful daily in the morning.  It does seem this is closely associated with his potassium supplementation.  We will recheck his potassium level and determine if this is still necessary.  In the meantime I think daily MiraLAX use would be beneficial due to " chronic ongoing constipation.  Patient advised to decrease use if he experiences excessively loose stools.  Advised against stimulant laxative such as senna.    Due to chronicity of this and patient's concern of weight loss, I would like him to check back in with gastroenterology.  Previously established.  -     polyethylene glycol 3350 (MIRALAX) Powder; Take 17 g by mouth every day.  -     REFERRAL TO GASTROENTEROLOGY    Hypokalemia: Chronically deficient, HCTZ stopped several months ago.  Recheck ordered.  Patient to get lab done in the next several days.  Previously discussed potassium rich foods to incorporate into diet routinely.  -     POTASSIUM SERUM (K); Future    Vitamin D deficiency: Deficient on last lab test, patient has been taking multivitamin but no additional supplementation.  Will recheck.  -     VITAMIN D,25 HYDROXY; Future    Essential hypertension: Reports good control at home with 10 mg amlodipine daily.  Has been improving diet.Encouraged him to relay several days worth of blood pressures to us via phone message to ensure adequate blood pressure control.     amLODIPine (NORVASC) 10 MG Tab; Take 1 Tab by mouth every day.    Borborygmi  -     polyethylene glycol 3350 (MIRALAX) Powder; Take 17 g by mouth every day.  -     REFERRAL TO GASTROENTEROLOGY    Weight loss  -     REFERRAL TO GASTROENTEROLOGY    Other orders: Discussed elevated ASCVD risk due to social/personal/familial history.  Patient amenable to taking a daily low-dose aspirin for further prevention.  Advised him to use this with food.  -     aspirin 81 MG EC tablet; Take 1 Tab by mouth every day.    Discussed signs and symptoms to seek out urgent medical care such as fever, chills, severe abdominal pain, nausea/vomiting.    Return in about 3 months (around 8/19/2020).    Patient verbalized understanding and agreed to plan of care.  Encouraged to contact me with needs via [email or web address] or by phone if needed.      I have placed the above  orders and discussed them with an approved delegating provider.  The MA is performing the below orders under the direction of Dr Ogden.    Please note that this dictation was created using voice recognition software. I have made every reasonable attempt to correct obvious errors, but I expect that there are errors of grammar and possibly content that I did not discover before finalizing the note.

## 2020-05-20 NOTE — PATIENT INSTRUCTIONS
Polyethylene Glycol powder  What is this medicine?  POLYETHYLENE GLYCOL 3350 (deb ee ETH i adilia; GLYE col) powder is a laxative used to treat constipation. It increases the amount of water in the stool. Bowel movements become easier and more frequent.  This medicine may be used for other purposes; ask your health care provider or pharmacist if you have questions.  COMMON BRAND NAME(S): GaviLax, GIALAX, GlycoLax, MiraLax, Smooth LAX, [employer]  What should I tell my health care provider before I take this medicine?  They need to know if you have any of these conditions:  -a history of blockage of the stomach or intestine  -current abdomen distension or pain  -difficulty swallowing  -diverticulitis, ulcerative colitis, or other chronic bowel disease  -phenylketonuria  -an unusual or allergic reaction to polyethylene glycol, other medicines, dyes, or preservatives  -pregnant or trying to get pregnant  -breast-feeding  How should I use this medicine?  Take this medicine by mouth. The bottle has a measuring cap that is marked with a line. Pour the powder into the cap up to the marked line (the dose is about 1 heaping tablespoon). Add the powder in the cap to a full glass (4 to 8 ounces or 120 to 240 ml) of water, juice, soda, coffee or tea. Mix the powder well. Drink the solution. Take exactly as directed. Do not take your medicine more often than directed.  Talk to your pediatrician regarding the use of this medicine in children. Special care may be needed.  Overdosage: If you think you have taken too much of this medicine contact a poison control center or emergency room at once.  NOTE: This medicine is only for you. Do not share this medicine with others.  What if I miss a dose?  If you miss a dose, take it as soon as you can. If it is almost time for your next dose, take only that dose. Do not take double or extra doses.  What may interact with this medicine?  Interactions are not expected.  This list may not  describe all possible interactions. Give your health care provider a list of all the medicines, herbs, non-prescription drugs, or dietary supplements you use. Also tell them if you smoke, drink alcohol, or use illegal drugs. Some items may interact with your medicine.  What should I watch for while using this medicine?  Do not use for more than 2 weeks without advice from your doctor or health care professional. It can take 2 to 4 days to have a bowel movement and to experience improvement in constipation. See your health care professional for any changes in bowel habits, including constipation, that are severe or last longer than three weeks.  Always take this medicine with plenty of water.  What side effects may I notice from receiving this medicine?  Side effects that you should report to your doctor or health care professional as soon as possible:  -diarrhea  -difficulty breathing  -itching of the skin, hives, or skin rash  -severe bloating, pain, or distension of the stomach  -vomiting  Side effects that usually do not require medical attention (report to your doctor or health care professional if they continue or are bothersome):  -bloating or gas  -lower abdominal discomfort or cramps  -nausea  This list may not describe all possible side effects. Call your doctor for medical advice about side effects. You may report side effects to FDA at 1-061-FDA-9948.  Where should I keep my medicine?  Keep out of the reach of children.  Store between 15 and 30 degrees C (59 and 86 degrees F). Throw away any unused medicine after the expiration date.  NOTE: This sheet is a summary. It may not cover all possible information. If you have questions about this medicine, talk to your doctor, pharmacist, or health care provider.  © 2018 [employer] (2009-07-20 16:50:45)

## 2020-06-07 ENCOUNTER — HOSPITAL ENCOUNTER (OUTPATIENT)
Dept: RADIOLOGY | Facility: MEDICAL CENTER | Age: 65
End: 2020-06-07
Attending: PHYSICIAN ASSISTANT
Payer: COMMERCIAL

## 2020-06-07 DIAGNOSIS — R19.4 ALTERED BOWEL HABITS: ICD-10-CM

## 2020-06-07 DIAGNOSIS — K59.00 CONSTIPATION, UNSPECIFIED CONSTIPATION TYPE: ICD-10-CM

## 2020-06-07 PROCEDURE — 74018 RADEX ABDOMEN 1 VIEW: CPT

## 2020-07-10 ENCOUNTER — APPOINTMENT (OUTPATIENT)
Dept: PULMONOLOGY | Facility: HOSPICE | Age: 65
End: 2020-07-10
Payer: COMMERCIAL

## 2020-07-21 ENCOUNTER — HOSPITAL ENCOUNTER (OUTPATIENT)
Dept: RADIOLOGY | Facility: MEDICAL CENTER | Age: 65
End: 2020-07-21
Attending: PHYSICIAN ASSISTANT
Payer: COMMERCIAL

## 2020-07-21 DIAGNOSIS — R53.83 OTHER FATIGUE: ICD-10-CM

## 2020-07-21 DIAGNOSIS — K30 MILD DIETARY INDIGESTION: ICD-10-CM

## 2020-07-21 DIAGNOSIS — R63.4 LOSS OF WEIGHT: ICD-10-CM

## 2020-07-21 PROCEDURE — 700117 HCHG RX CONTRAST REV CODE 255: Performed by: PHYSICIAN ASSISTANT

## 2020-07-21 PROCEDURE — 71260 CT THORAX DX C+: CPT

## 2020-07-21 RX ADMIN — IOHEXOL 25 ML: 240 INJECTION, SOLUTION INTRATHECAL; INTRAVASCULAR; INTRAVENOUS; ORAL at 14:43

## 2020-07-21 RX ADMIN — IOHEXOL 100 ML: 350 INJECTION, SOLUTION INTRAVENOUS at 14:44

## 2020-08-25 ENCOUNTER — OFFICE VISIT (OUTPATIENT)
Dept: MEDICAL GROUP | Facility: PHYSICIAN GROUP | Age: 65
End: 2020-08-25
Payer: COMMERCIAL

## 2020-08-25 VITALS
SYSTOLIC BLOOD PRESSURE: 132 MMHG | DIASTOLIC BLOOD PRESSURE: 80 MMHG | TEMPERATURE: 99 F | HEART RATE: 80 BPM | HEIGHT: 73 IN | BODY MASS INDEX: 26.11 KG/M2 | WEIGHT: 197 LBS | OXYGEN SATURATION: 96 %

## 2020-08-25 DIAGNOSIS — I10 ESSENTIAL HYPERTENSION: ICD-10-CM

## 2020-08-25 DIAGNOSIS — R79.89 ABNORMAL LIVER FUNCTION TESTS: ICD-10-CM

## 2020-08-25 DIAGNOSIS — I72.3 ANEURYSM OF LEFT COMMON ILIAC ARTERY (HCC): ICD-10-CM

## 2020-08-25 DIAGNOSIS — E78.5 HYPERLIPIDEMIA, UNSPECIFIED HYPERLIPIDEMIA TYPE: ICD-10-CM

## 2020-08-25 DIAGNOSIS — I71.40 ABDOMINAL AORTIC ANEURYSM (AAA) WITHOUT RUPTURE (HCC): ICD-10-CM

## 2020-08-25 DIAGNOSIS — R82.998 AMBER-COLORED URINE: ICD-10-CM

## 2020-08-25 DIAGNOSIS — E87.6 HYPOKALEMIA: ICD-10-CM

## 2020-08-25 DIAGNOSIS — E55.9 VITAMIN D DEFICIENCY: ICD-10-CM

## 2020-08-25 DIAGNOSIS — I70.0 AORTIC ATHEROSCLEROSIS (HCC): ICD-10-CM

## 2020-08-25 PROCEDURE — 99214 OFFICE O/P EST MOD 30 MIN: CPT | Performed by: NURSE PRACTITIONER

## 2020-08-25 RX ORDER — DOCUSATE SODIUM 100 MG/1
100 CAPSULE, LIQUID FILLED ORAL DAILY
COMMUNITY
End: 2022-06-27

## 2020-08-25 ASSESSMENT — FIBROSIS 4 INDEX: FIB4 SCORE: 0.79

## 2020-08-25 NOTE — PROGRESS NOTES
Chief Complaint   Patient presents with   • Lab Results   • Results     imaging         Subjective:     Bladimir Mitchell is a 64 y.o. male presenting with aneurysm.    Patient recently had an chest and abdominal CT.  He has been experiencing unintentional weight loss and early satiety.  He follows with GI.  Incidental findings on this showed aortic and coronary atherosclerosis, a distal abdominal aortic aneurysm measuring up to 3.3 cm, left common iliac artery aneurysm measuring up to 2 cm, as well as diverticulosis without diverticulitis.    He is not established with cardiology or vascular.  His blood pressure is managed with losartan and amlodipine.  Last fasting lab work was done in December 2019 showing dyslipidemia with LDL at 100 HDL at 38.  Tolerating moderate dose statin well, on low-dose aspirin currently.    Reports that recently he has had several episodes of sangeetha-colored urine.  He does report this is often when he is working and does not drink a lot of water, but it still brings him concerned.  He feels that at the beginning of his urine stream the urine is clear and then ends with an orange tinge.  Denies pain, burning, discharge, urinary sediment, visible blood in urine.      Review of systems:      Denies chest pain, shortness of breath, sore throat, difficulty swallowing, new cough, dizziness, severe headache, altered cognition, decreased sensation, decreased strength, numbness or tingling, intolerable depression or anxiety, rash or skin concerns, changes in vision, fatigue, myalgias, painful or swollen lymph nodes.       Current Outpatient Medications:   •  docusate sodium (COLACE) 100 MG Cap, Take 100 mg by mouth every day., Disp: , Rfl:   •  polyethylene glycol 3350 (MIRALAX) Powder, Take 17 g by mouth every day., Disp: 3 Bottle, Rfl: 3  •  amLODIPine (NORVASC) 10 MG Tab, Take 1 Tab by mouth every day., Disp: 90 Tab, Rfl: 3  •  aspirin 81 MG EC tablet, Take 1 Tab by mouth every day., Disp:  "90 Tab, Rfl: 3  •  losartan (COZAAR) 50 MG Tab, TAKE 1 TABLET BY MOUTH EVERY DAY, Disp: 90 Tab, Rfl: 1  •  atorvastatin (LIPITOR) 40 MG Tab, Take 1 Tab by mouth every day., Disp: 90 Tab, Rfl: 3  •  potassium chloride SA (KDUR) 20 MEQ Tab CR, Take 1 Tab by mouth 2 times a day., Disp: 90 Tab, Rfl: 0  •  fluticasone (FLONASE) 50 MCG/ACT nasal spray, FLUTICASONE PROPIONATE 50 MCG/ACT SUSP, Disp: , Rfl:     Allergies, past medical history, past surgical history, family history, social history reviewed and updated    Objective:     Vitals: /80 (BP Location: Right arm, Patient Position: Sitting, BP Cuff Size: Adult)   Pulse 80   Temp 37.2 °C (99 °F) (Temporal)   Ht 1.854 m (6' 1\")   Wt 89.4 kg (197 lb)   SpO2 96%   BMI 25.99 kg/m²   Constitutional: Alert, no distress, well-groomed.  Skin: Warm, dry, good turgor, no rashes in visible areas.  Eye: Equal, round and reactive, conjunctiva clear, lids normal.  ENMT: Lips without lesions, good dentition, moist mucous membranes.  Neck: Trachea midline, no masses, no thyromegaly.  Respiratory: Unlabored respiratory effort, no cough.  MSK: Normal gait, moves all extremities.  Neuro: Grossly non-focal.   Psych: Alert and oriented x3, normal affect and mood.    Assessment/Plan:     Bladimir was seen today for lab results and results.    Diagnoses and all orders for this visit:    Aortic atherosclerosis (HCC): On moderate dose statin and aspirin.  Will reevaluate fasting lipid panel to see if LDL is above goal, will increase statin as tolerated.    Aneurysm of left common iliac artery (HCC) / Abdominal aortic aneurysm (AAA) without rupture (HCC):    Discussed this with patient including risks of worsening and impaired blood flow/rupture.  Fortunately, the report does read that it is small, but I do think he would benefit from specialist monitoring and evaluation.  -     REFERRAL TO VASCULAR SURGERY  -     CBC WITH DIFFERENTIAL; Future  -     Comp Metabolic Panel; " Future    Pamela-colored urine: Likely associated with dehydration, but will obtain urinalysis regardless.  May be associated with impaired liver function, will reevaluate updated CMP.  -     URINALYSIS,CULTURE IF INDICATED; Future    Abnormal liver function tests  -     URINALYSIS,CULTURE IF INDICATED; Future  -     Comp Metabolic Panel; Future    Hyperlipidemia, unspecified hyperlipidemia type  -     Lipid Profile; Future    Vitamin D deficiency  -     VITAMIN D,25 HYDROXY; Future    Hypokalemia  -     Comp Metabolic Panel; Future    Essential hypertension: Continue to monitor, tolerating losartan and amlodipine well.  Potassium gradually coming up.  Patient is taking potassium supplement as tolerated 1 dose every couple of days due to GI upset.  Provided additional information on potassium rich foods and encouraged him to incorporate these into his diet on a routine basis.          Return in about 3 months (around 11/25/2020).    Patient verbalized understanding and agreed to plan of care.  Encouraged to contact me with needs via [employer] or by phone if needed.      I have placed the above orders and discussed them with an approved delegating provider.  The MA is performing the below orders under the direction of Dr Ogden.    Please note that this dictation was created using voice recognition software. I have made every reasonable attempt to correct obvious errors, but I expect that there are errors of grammar and possibly content that I did not discover before finalizing the note.

## 2020-08-25 NOTE — PATIENT INSTRUCTIONS
Potassium Content of Foods    Potassium is a mineral found in many foods and drinks. It affects how the heart works, and helps keep fluids and minerals balanced in the body.  The amount of potassium you need each day depends on your age and any medical conditions you may have. Talk to your health care provider or dietitian about how much potassium you need.  The following lists of foods provide the general serving size for foods and the approximate amount of potassium in each serving, listed in milligrams (mg). Actual values may vary depending on the product and how it is processed.  High in potassium  The following foods and beverages have 200 mg or more of potassium per serving:  · Apricots (raw) - 2 have 200 mg of potassium.  · Apricots (dry) - 5 have 200 mg of potassium.  · Artichoke - 1 medium has 345 mg of potassium.  · Avocado - ¼ fruit has 245 mg of potassium.  · Banana - 1 medium fruit has 425 mg of potassium.  · Lima or baked beans (canned) - ½ cup has 280 mg of potassium.  · White beans (canned) - ½ cup has 595 mg potassium.  · Beef roast - 3 oz has 320 mg of potassium.  · Ground beef - 3 oz has 270 mg of potassium.  · Beets (raw or cooked) - ½ cup has 260 mg of potassium.  · Bran muffin - 2 oz has 300 mg of potassium.  · Broccoli (cooked) - ½ cup has 230 mg of potassium.  · [place] sprouts - ½ cup has 250 mg of potassium.  · Cantaloupe - ½ cup has 215 mg of potassium.  · Cereal, 100% bran - ½ cup has 200-400 mg of potassium.  · Cheeseburger -1 single fast food burger has 225-400 mg of potassium.  · Chicken - 3 oz has 220 mg of potassium.  · Clams (canned) - 3 oz has 535 mg of potassium.  · Crab - 3 oz has 225 mg of potassium.  · Dates - 5 have 270 mg of potassium.  · Dried beans and peas - ½ cup has 300-475 mg of potassium.  · Figs (dried) - 2 have 260 mg of potassium.  · Fish (halibut, tuna, cod, snapper) - 3 oz has 480 mg of potassium.  · Fish (salmon, marielena, swordfish, perch) - 3 oz has 300 mg of  potassium.  · Fish (tuna, canned) - 3 oz has 200 mg of potassium.  · French fries (fast food) - 3 oz has 470 mg of potassium.  · Granola with fruit and nuts - ½ cup has 200 mg of potassium.  · Grapefruit juice - ½ cup has 200 mg of potassium.  · Honeydew melon - ½ cup has 200 mg of potassium.  · Kale (raw) - 1 cup has 300 mg of potassium.  · Kiwi - 1 medium fruit has 240 mg of potassium.  · Kohlrabi, rutabaga, parsnips - ½ cup has 280 mg of potassium.  · Lentils - ½ cup has 365 mg of potassium.  · Kevin - 1 each has 325 mg of potassium.  · Milk (nonfat, low-fat, whole, buttermilk) - 1 cup has 350-380 mg of potassium.  · Milk (chocolate) - 1 cup has 420 mg of potassium  · Molasses - 1 Tbsp has 295 mg of potassium.  · Mushrooms - ½ cup has 280 mg of potassium.  · Nectarine - 1 each has 275 mg of potassium.  · Nuts (almonds, peanuts, hazelnuts, [place], cashew, mixed) - 1 oz has 200 mg of potassium.  · Nuts (pistachios) - 1 oz has 295 mg of potassium.  · Orange - 1 fruit has 240 mg of potassium.  · Orange juice - ½ cup has 235 mg of potassium.  · Papaya - ½ medium fruit has 390 mg of potassium.  · Peanut butter (chunky) - 2 Tbsp has 240 mg of potassium.  · Peanut butter (smooth) - 2 Tbsp has 210 mg of potassium.  · Pear - 1 medium (200 mg) of potassium.  · Pomegranate - 1 whole fruit has 400 mg of potassium.  · Pomegranate juice - ½ cup has 215 mg of potassium.  · Pork - 3 oz has 350 mg of potassium.  · Potato chips (salted) - 1 oz has 465 mg of potassium.  · Potato (baked with skin) - 1 medium has 925 mg of potassium.  · Potato (boiled) - ½ cup has 255 mg of potassium.  · Potato (Mashed) - ½ cup has 330 mg of potassium.  · Prune juice - ½ cup has 370 mg of potassium.  · Prunes - 5 have 305 mg of potassium.  · Pudding (chocolate) - ½ cup has 230 mg of potassium.  · Pumpkin (canned) - ½ cup has 250 mg of potassium.  · Raisins (seedless) - ¼ cup has 270 mg of potassium.  · Seeds (sunflower or pumpkin) - 1 oz has 240 mg of  potassium.  · Soy milk - 1 cup has 300 mg of potassium.  · Spinach (cooked) - 1/2 cup has 420 mg of potassium.  · Spinach (canned) - ½ cup has 370 mg of potassium.  · Sweet potato (baked with skin) - 1 medium has 450 mg of potassium.  · Swiss chard - ½ cup has 480 mg of potassium.  · Tomato or vegetable juice - ½ cup has 275 mg of potassium.  · Tomato (sauce or puree) - ½ cup has 400-550 mg of potassium.  · Tomato (raw) - 1 medium has 290 mg of potassium.  · Tomato (canned) - ½ cup has 200-300 mg of potassium.  · Turkey - 3 oz has 250 mg of potassium.  · Wheat germ - 1 oz has 250 mg of potassium.  · Winter squash - ½ cup has 250 mg of potassium.  · Yogurt (plain or fruited) - 6 oz has 260-435 mg of potassium.  · Zucchini - ½ cup has 220 mg of potassium.  Moderate in potassium  The following foods and beverages have  mg of potassium per serving:  · Apple - 1 fruit has 150 mg of potassium  · Apple juice - ½ cup has 150 mg of potassium  · Applesauce - ½ cup has 90 mg of potassium  · Apricot nectar - ½ cup has 140 mg of potassium  · Asparagus (small carney) - ½ cup has 155 mg of potassium  · Asparagus (large carney) - 6 have 155 mg of potassium  · Bagel (cinnamon raisin) - 1 four-inch bagel has 130 mg of potassium  · Bagel (egg or plain) - 1 four- inch bagel has 70 mg of potassium  · Beans (green) - ½ cup has 90 mg of potassium  · Beans (yellow) - ½ cup has 190 mg of potassium  · Beer, regular - 12 oz has 100 mg of potassium  · Beets (canned) - ½ cup has 125 mg of potassium  · Blackberries - ½ cup has 115 mg of potassium  · Blueberries - ½ cup has 60 mg of potassium  · Bread (whole wheat) - 1 slice has 70 mg of potassium  · Broccoli (raw) - ½ cup has 145 mg of potassium  · Cabbage - ½ cup has 150 mg of potassium  · Carrots (cooked or raw) - ½ cup has 180 mg of potassium  · Cauliflower (raw) - ½ cup has 150 mg of potassium  · Celery (raw) - ½ cup has 155 mg of potassium  · Cereal, bran flakes - ½ cup has 120-150 mg  of potassium  · Cheese (cottage) - ½ cup has 110 mg of potassium  · Cherries - 10 have 150 mg of potassium  · Chocolate - 1½ oz bar has 165 mg of potassium  · Coffee (brewed) - 6 oz has 90 mg of potassium  · Corn - ½ cup or 1 ear has 195 mg of potassium  · Cucumbers - ½ cup has 80 mg of potassium  · Egg - 1 large egg has 60 mg of potassium  · Eggplant - ½ cup has 60 mg of potassium  · Endive (raw) - ½ cup has 80 mg of potassium  · English muffin - 1 has 65 mg of potassium  · Fish (ocean perch) - 3 oz has 192 mg of potassium  · Frankfurter, beef or pork - 1 has 75 mg of potassium  · Fruit cocktail - ½ cup has 115 mg of potassium  · Grape juice - ½ cup has 170 mg of potassium  · Grapefruit - ½ fruit has 175 mg of potassium  · Grapes - ½ cup has 155 mg of potassium  · Greens: kale, turnip, cash - ½ cup has 110-150 mg of potassium  · Ice cream or frozen yogurt (chocolate) - ½ cup has 175 mg of potassium  · Ice cream or frozen yogurt (vanilla) - ½ cup has 120-150 mg of potassium  · Hayden, limes - 1 each has 80 mg of potassium  · Lettuce - 1 cup has 100 mg of potassium  · Mixed vegetables - ½ cup has 150 mg of potassium  · Mushrooms, raw - ½ cup has 110 mg of potassium  · Nuts (walnuts, pecans, or macadamia) - 1 oz has 125 mg of potassium  · Oatmeal - ½ cup has 80 mg of potassium  · Okra - ½ cup has 110 mg of potassium  · Onions - ½ cup has 120 mg of potassium  · Peach - 1 has 185 mg of potassium  · Peaches (canned) - ½ cup has 120 mg of potassium  · Pears (canned) - ½ cup has 120 mg of potassium  · Peas, green (frozen) - ½ cup has 90 mg of potassium  · Peppers (Green) - ½ cup has 130 mg of potassium  · Peppers (Red) - ½ cup has 160 mg of potassium  · Pineapple juice - ½ cup has 165 mg of potassium  · Pineapple (fresh or canned) - ½ cup has 100 mg of potassium  · Plums - 1 has 105 mg of potassium  · Pudding, vanilla - ½ cup has 150 mg of potassium  · Raspberries - ½ cup has 90 mg of potassium  · Rhubarb - ½ cup has  115 mg of potassium  · Rice, wild - ½ cup has 80 mg of potassium  · Shrimp - 3 oz has 155 mg of potassium  · Spinach (raw) - 1 cup has 170 mg of potassium  · Strawberries - ½ cup has 125 mg of potassium  · Summer squash - ½ cup has 175-200 mg of potassium  · Swiss chard (raw) - 1 cup has 135 mg of potassium  · Tangerines - 1 fruit has 140 mg of potassium  · Tea, brewed - 6 oz has 65 mg of potassium  · Turnips - ½ cup has 140 mg of potassium  · Watermelon - ½ cup has 85 mg of potassium  · Wine (Red, table) - 5 oz has 180 mg of potassium  · Wine (White, table) - 5 oz 100 mg of potassium  Low in potassium  The following foods and beverages have less than 50 mg of potassium per serving.  · Bread (white) - 1 slice has 30 mg of potassium  · Carbonated beverages - 12 oz has less than 5 mg of potassium  · Cheese - 1 oz has 20-30 mg of potassium  · Cranberries - ½ cup has 45 mg of potassium  · Cranberry juice cocktail - ½ cup has 20 mg of potassium  · Fats and oils - 1 Tbsp has less than 5 mg of potassium  · Hummus - 1 Tbsp has 32 mg of potassium  · Nectar (papaya, nadia, or pear) - ½ cup has 35 mg of potassium  · Rice (white or brown) - ½ cup has 50 mg of potassium  · Spaghetti or macaroni (cooked) - ½ cup has 30 mg of potassium  · Tortilla, flour or corn - 1 has 50 mg of potassium  · Waffle - 1 four-inch waffle has 50 mg of potassium  · Water chestnuts - ½ cup has 40 mg of potassium  Summary  · Potassium is a mineral found in many foods and drinks. It affects how the heart works, and helps keep fluids and minerals balanced in the body.  · The amount of potassium you need each day depends on your age and any existing medical conditions you may have. Your health care provider or dietitian may recommend an amount of potassium that you should have each day.  This information is not intended to replace advice given to you by your health care provider. Make sure you discuss any questions you have with your health care  provider.  Document Released: 08/01/2006 Document Revised: 11/30/2018 Document Reviewed: 03/14/2018  Elsevier Patient Education © 2020 [employer]

## 2020-08-27 PROBLEM — I72.3 ANEURYSM OF LEFT COMMON ILIAC ARTERY (HCC): Chronic | Status: ACTIVE | Noted: 2020-08-27

## 2020-08-27 PROBLEM — I72.3 ANEURYSM OF LEFT COMMON ILIAC ARTERY (HCC): Status: ACTIVE | Noted: 2020-08-27

## 2020-08-27 PROBLEM — I70.0 AORTIC ATHEROSCLEROSIS (HCC): Status: ACTIVE | Noted: 2020-08-27

## 2020-08-27 PROBLEM — R79.89 ABNORMAL LIVER FUNCTION TESTS: Chronic | Status: ACTIVE | Noted: 2017-03-27

## 2020-08-27 PROBLEM — I71.40 ABDOMINAL AORTIC ANEURYSM (AAA) WITHOUT RUPTURE (HCC): Status: ACTIVE | Noted: 2020-08-27

## 2020-08-27 PROBLEM — I70.0 AORTIC ATHEROSCLEROSIS (HCC): Chronic | Status: ACTIVE | Noted: 2020-08-27

## 2020-08-27 PROBLEM — I71.40 ABDOMINAL AORTIC ANEURYSM (AAA) WITHOUT RUPTURE (HCC): Chronic | Status: ACTIVE | Noted: 2020-08-27

## 2020-09-20 DIAGNOSIS — I10 ESSENTIAL HYPERTENSION: ICD-10-CM

## 2020-09-22 RX ORDER — LOSARTAN POTASSIUM 50 MG/1
TABLET ORAL
Qty: 90 TAB | Refills: 1 | Status: SHIPPED | OUTPATIENT
Start: 2020-09-22 | End: 2021-01-05

## 2020-09-22 NOTE — TELEPHONE ENCOUNTER
Refill X 6 months, sent to pharmacy.Pt. Seen in the last 6 months per protocol.   Lab Results   Component Value Date/Time    SODIUM 140 02/21/2020 05:36 AM    SODIUM 142 12/06/2019 07:20 AM    POTASSIUM 3.4 (L) 02/21/2020 05:36 AM    POTASSIUM 3.1 (L) 12/06/2019 07:20 AM    CHLORIDE 99 02/21/2020 05:36 AM    CHLORIDE 104 12/06/2019 07:20 AM    CO2 25 02/21/2020 05:36 AM    CO2 30 12/06/2019 07:20 AM    GLUCOSE 102 (H) 02/21/2020 05:36 AM    GLUCOSE 93 12/06/2019 07:20 AM    BUN 15 02/21/2020 05:36 AM    BUN 12 12/06/2019 07:20 AM    CREATININE 0.91 02/21/2020 05:36 AM    CREATININE 0.79 12/06/2019 07:20 AM    BUNCREATRAT 16 02/21/2020 05:36 AM     Has upcoming appt w/ PCP. Will send 3 months of fills to pharmacy.

## 2020-10-26 ENCOUNTER — TELEPHONE (OUTPATIENT)
Dept: MEDICAL GROUP | Facility: PHYSICIAN GROUP | Age: 65
End: 2020-10-26

## 2020-10-26 LAB
25(OH)D3+25(OH)D2 SERPL-MCNC: 32.8 NG/ML (ref 30–100)
ALBUMIN SERPL-MCNC: 4.5 G/DL (ref 3.8–4.8)
ALBUMIN/GLOB SERPL: 1.6 {RATIO} (ref 1.2–2.2)
ALP SERPL-CCNC: 80 IU/L (ref 39–117)
ALT SERPL-CCNC: 20 IU/L (ref 0–44)
AMBIG ABBREV CMP14 DFLT   977206: NORMAL
AMBIG ABBREV LP  977212: NORMAL
APPEARANCE UR: CLEAR
AST SERPL-CCNC: 19 IU/L (ref 0–40)
BACTERIA #/AREA URNS HPF: NORMAL /[HPF]
BASOPHILS # BLD AUTO: 0.1 X10E3/UL (ref 0–0.2)
BASOPHILS NFR BLD AUTO: 1 %
BILIRUB SERPL-MCNC: 1.8 MG/DL (ref 0–1.2)
BILIRUB UR QL STRIP: NEGATIVE
BUN SERPL-MCNC: 10 MG/DL (ref 8–27)
BUN/CREAT SERPL: 13 (ref 10–24)
CALCIUM SERPL-MCNC: 9.8 MG/DL (ref 8.6–10.2)
CASTS URNS MICRO: NORMAL
CASTS URNS QL MICRO: NORMAL
CHLORIDE SERPL-SCNC: 101 MMOL/L (ref 96–106)
CHOLEST SERPL-MCNC: 161 MG/DL (ref 100–199)
CO2 SERPL-SCNC: 25 MMOL/L (ref 20–29)
COLOR UR: YELLOW
CREAT SERPL-MCNC: 0.79 MG/DL (ref 0.76–1.27)
CRYSTALS URNS MICRO: NORMAL
EOSINOPHIL # BLD AUTO: 0.1 X10E3/UL (ref 0–0.4)
EOSINOPHIL NFR BLD AUTO: 1 %
EPI CELLS #/AREA URNS HPF: NORMAL /HPF (ref 0–10)
ERYTHROCYTE [DISTWIDTH] IN BLOOD BY AUTOMATED COUNT: 13.6 % (ref 11.6–15.4)
GLOBULIN SER CALC-MCNC: 2.8 G/DL (ref 1.5–4.5)
GLUCOSE SERPL-MCNC: 111 MG/DL (ref 65–99)
GLUCOSE UR QL: NEGATIVE
HCT VFR BLD AUTO: 48.7 % (ref 37.5–51)
HDLC SERPL-MCNC: 58 MG/DL
HGB BLD-MCNC: 16.9 G/DL (ref 13–17.7)
HGB UR QL STRIP: NEGATIVE
IMM GRANULOCYTES # BLD AUTO: 0 X10E3/UL (ref 0–0.1)
IMM GRANULOCYTES NFR BLD AUTO: 0 %
IMMATURE CELLS  115398: NORMAL
KETONES UR QL STRIP: NEGATIVE
LABORATORY COMMENT REPORT: NORMAL
LDLC SERPL CALC-MCNC: 90 MG/DL (ref 0–99)
LEUKOCYTE ESTERASE UR QL STRIP: NEGATIVE
LYMPHOCYTES # BLD AUTO: 1.5 X10E3/UL (ref 0.7–3.1)
LYMPHOCYTES NFR BLD AUTO: 26 %
MCH RBC QN AUTO: 31.1 PG (ref 26.6–33)
MCHC RBC AUTO-ENTMCNC: 34.7 G/DL (ref 31.5–35.7)
MCV RBC AUTO: 90 FL (ref 79–97)
MICRO URNS: NORMAL
MICRO URNS: NORMAL
MONOCYTES # BLD AUTO: 0.4 X10E3/UL (ref 0.1–0.9)
MONOCYTES NFR BLD AUTO: 7 %
MORPHOLOGY BLD-IMP: NORMAL
MUCOUS THREADS URNS QL MICRO: PRESENT
NEUTROPHILS # BLD AUTO: 3.8 X10E3/UL (ref 1.4–7)
NEUTROPHILS NFR BLD AUTO: 65 %
NITRITE UR QL STRIP: NEGATIVE
NRBC BLD AUTO-RTO: NORMAL %
PH UR STRIP: 7.5 [PH] (ref 5–7.5)
POTASSIUM SERPL-SCNC: 3.5 MMOL/L (ref 3.5–5.2)
PROT SERPL-MCNC: 7.3 G/DL (ref 6–8.5)
PROT UR QL STRIP: NEGATIVE
RBC # BLD AUTO: 5.44 X10E6/UL (ref 4.14–5.8)
RBC #/AREA URNS HPF: NORMAL /HPF (ref 0–2)
RENAL EPI CELLS #/AREA URNS HPF: NORMAL /[HPF]
SODIUM SERPL-SCNC: 141 MMOL/L (ref 134–144)
SP GR UR: 1.01 (ref 1–1.03)
T VAGINALIS URNS QL MICRO: NORMAL
TRIGL SERPL-MCNC: 69 MG/DL (ref 0–149)
UNIDENT CRYS URNS QL MICRO: NORMAL
URINALYSIS REFLEX  377202: NORMAL
URNS CMNT MICRO: NORMAL
UROBILINOGEN UR STRIP-MCNC: 1 MG/DL (ref 0.2–1)
VLDLC SERPL CALC-MCNC: 13 MG/DL (ref 5–40)
WBC # BLD AUTO: 5.8 X10E3/UL (ref 3.4–10.8)
WBC #/AREA URNS HPF: NORMAL /HPF (ref 0–5)
YEAST #/AREA URNS HPF: NORMAL /[HPF]

## 2020-10-26 NOTE — TELEPHONE ENCOUNTER
----- Message from ADRYAN Teran sent at 10/23/2020  4:16 PM PDT -----  Please call patient and let them know his labs came back.  They look really good overall.  His potassium is now within normal range, but I do want him to continue eating a potassium rich diet and taking a supplement a couple times a week.  We can go over them in further detail at his appointment at the end of the month which will need to be rescheduled because I will be out of town for Thanksgiving.  Thanks, Josi JAMISON

## 2020-10-29 ENCOUNTER — OFFICE VISIT (OUTPATIENT)
Dept: MEDICAL GROUP | Facility: PHYSICIAN GROUP | Age: 65
End: 2020-10-29
Payer: COMMERCIAL

## 2020-10-29 VITALS
TEMPERATURE: 99.4 F | SYSTOLIC BLOOD PRESSURE: 130 MMHG | HEART RATE: 75 BPM | OXYGEN SATURATION: 97 % | HEIGHT: 73 IN | DIASTOLIC BLOOD PRESSURE: 78 MMHG | BODY MASS INDEX: 26.51 KG/M2 | WEIGHT: 200 LBS

## 2020-10-29 DIAGNOSIS — E87.6 HYPOKALEMIA: ICD-10-CM

## 2020-10-29 DIAGNOSIS — I10 ESSENTIAL HYPERTENSION: ICD-10-CM

## 2020-10-29 DIAGNOSIS — K59.00 CONSTIPATION, UNSPECIFIED CONSTIPATION TYPE: ICD-10-CM

## 2020-10-29 PROCEDURE — 99214 OFFICE O/P EST MOD 30 MIN: CPT | Performed by: NURSE PRACTITIONER

## 2020-10-29 ASSESSMENT — FIBROSIS 4 INDEX: FIB4 SCORE: 0.89

## 2020-10-29 NOTE — PROGRESS NOTES
Chief Complaint   Patient presents with   • Fatigue   • Constipation         Subjective:     Bladimir Mitchell is a 64 y.o. male presenting with constipation.    Constipation: Patient has had chronic constipation, partially associated with potassium supplementation.  He has been taking chronic Colace as prescribed by GI.  He is followed with them closely and they have recommended certain dietary changes but are not concerned and do not wish to repeat a colonoscopy at this time.  With chronic stool softener use he is able to have a bowel movement every 1 to 3 days without difficulty passing.  He states that he believes that his lack of bowel movement without stool softener use is associated with a low intake of food.  He has not been tracking his dietary intake.    Hypokalemia: Improved since last labs done in February.  Patient has been taking potassium supplements for several months.  He has decreased use to 1 a day.  Has worked to increase dietary sources of potassium in his routine.  No longer on potassium wasting diuretic.    Hypertension: Chronic, controlled.  Patient tolerating amlodipine and losartan well without significant side effects.  Switched from lisinopril due to dry cough and HCTZ due to chronic hypokalemia.        Review of systems:      Denies chest pain, shortness of breath, sore throat, difficulty swallowing, new cough, dizziness, severe headache, altered cognition,decreased strength, numbness or tingling, intolerable depression or anxiety, rash or skin concerns, changes in vision, myalgias, painful or swollen lymph nodes.       Current Outpatient Medications:   •  losartan (COZAAR) 50 MG Tab, TAKE 1 TABLET BY MOUTH EVERY DAY, Disp: 90 Tab, Rfl: 1  •  docusate sodium (COLACE) 100 MG Cap, Take 100 mg by mouth every day., Disp: , Rfl:   •  polyethylene glycol 3350 (MIRALAX) Powder, Take 17 g by mouth every day., Disp: 3 Bottle, Rfl: 3  •  amLODIPine (NORVASC) 10 MG Tab, Take 1 Tab by mouth  "every day., Disp: 90 Tab, Rfl: 3  •  aspirin 81 MG EC tablet, Take 1 Tab by mouth every day., Disp: 90 Tab, Rfl: 3  •  atorvastatin (LIPITOR) 40 MG Tab, Take 1 Tab by mouth every day., Disp: 90 Tab, Rfl: 3  •  potassium chloride SA (KDUR) 20 MEQ Tab CR, Take 1 Tab by mouth 2 times a day., Disp: 90 Tab, Rfl: 0    Allergies, past medical history, past surgical history, family history, social history reviewed and updated    Objective:     Vitals: /78 (BP Location: Left arm, Patient Position: Sitting, BP Cuff Size: Large adult)   Pulse 75   Temp 37.4 °C (99.4 °F) (Temporal)   Ht 1.854 m (6' 1\")   Wt 90.7 kg (200 lb)   SpO2 97%   BMI 26.39 kg/m²   General: Alert, cooperative, dressed appropriately for weather / situation  Eyes:Normocephalic.  EOMI, no icterus or pallor.  Conjunctivae clear without erythema / irritation.  ENT:  External ears developed    Heart: Regular rate and rhythm.  S1 and S2 normal.  No murmurs auscultated;   Respiratory: Normal respiratory effort.  Clear to auscultation bilaterally.  AP ratio 1:2   Abdomen: Non-distended;   Skin: Visible skin intact, dry, without rash.  Musculoskeletal: Gait is normal.  Bilateral  strength strong equal.  Moves extremities freely and equally bilaterally  Neuro:  AAOx3 Visual tracking intact, no nystagmus;   Psych:  Affect/mood is normal, judgement is good, memory is intact, grooming is appropriate.    Assessment/Plan:     Bladimir was seen today for fatigue and constipation.    Diagnoses and all orders for this visit:    Hypokalemia: Advised patient to continue potassium supplementation every other day/2-3 times per week as it is still low normal.  Will recheck in 6 months.    Essential hypertension: Continue current regimen and monitoring at home    Constipation, unspecified constipation type: Patient was taking double stool softeners, over-the-counter and prescription both docusate sodium.  Advised to only take 1 of these.  He can supplement with " extra water and polyethylene glycol as needed.  Advised to avoid any stimulant laxatives.  Recommended he keep a food log to identify his intake, possible triggering foods and supplements, and identify how much she is really taking and versus putting out.          Return in about 6 months (around 4/29/2021).    Patient verbalized understanding and agreed to plan of care.  Encouraged to contact me with needs via [employer]art or by phone if needed.      I have placed the above orders and discussed them with an approved delegating provider.  The MA is performing the below orders under the direction of Dr Ogden.    Please note that this dictation was created using voice recognition software. I have made every reasonable attempt to correct obvious errors, but I expect that there are errors of grammar and possibly content that I did not discover before finalizing the note.

## 2021-01-03 DIAGNOSIS — I10 ESSENTIAL HYPERTENSION: ICD-10-CM

## 2021-01-05 RX ORDER — LOSARTAN POTASSIUM 50 MG/1
TABLET ORAL
Qty: 90 TAB | Refills: 1 | Status: SHIPPED | OUTPATIENT
Start: 2021-01-05 | End: 2021-11-12

## 2021-01-05 NOTE — TELEPHONE ENCOUNTER
Refill X 6 months, sent to pharmacy.Pt. Seen in the last 6 months per protocol.   Lab Results   Component Value Date/Time    SODIUM 141 10/22/2020 09:00 AM    SODIUM 142 12/06/2019 07:20 AM    POTASSIUM 3.5 10/22/2020 09:00 AM    POTASSIUM 3.1 (L) 12/06/2019 07:20 AM    CHLORIDE 101 10/22/2020 09:00 AM    CHLORIDE 104 12/06/2019 07:20 AM    CO2 25 10/22/2020 09:00 AM    CO2 30 12/06/2019 07:20 AM    GLUCOSE 111 (H) 10/22/2020 09:00 AM    GLUCOSE 93 12/06/2019 07:20 AM    BUN 10 10/22/2020 09:00 AM    BUN 12 12/06/2019 07:20 AM    CREATININE 0.79 10/22/2020 09:00 AM    CREATININE 0.79 12/06/2019 07:20 AM    BUNCREATRAT 13 10/22/2020 09:00 AM

## 2021-01-18 ENCOUNTER — TELEPHONE (OUTPATIENT)
Dept: MEDICAL GROUP | Facility: PHYSICIAN GROUP | Age: 66
End: 2021-01-18

## 2021-01-18 NOTE — TELEPHONE ENCOUNTER
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  Please advise

## 2021-03-15 DIAGNOSIS — Z23 NEED FOR VACCINATION: ICD-10-CM

## 2021-03-19 ENCOUNTER — IMMUNIZATION (OUTPATIENT)
Dept: FAMILY PLANNING/WOMEN'S HEALTH CLINIC | Facility: IMMUNIZATION CENTER | Age: 66
End: 2021-03-19
Attending: INTERNAL MEDICINE
Payer: COMMERCIAL

## 2021-03-19 DIAGNOSIS — Z23 ENCOUNTER FOR VACCINATION: Primary | ICD-10-CM

## 2021-03-19 DIAGNOSIS — Z23 NEED FOR VACCINATION: ICD-10-CM

## 2021-03-19 PROCEDURE — 91301 MODERNA SARS-COV-2 VACCINE: CPT

## 2021-03-19 PROCEDURE — 0011A MODERNA SARS-COV-2 VACCINE: CPT

## 2021-04-17 ENCOUNTER — IMMUNIZATION (OUTPATIENT)
Dept: FAMILY PLANNING/WOMEN'S HEALTH CLINIC | Facility: IMMUNIZATION CENTER | Age: 66
End: 2021-04-17
Attending: INTERNAL MEDICINE
Payer: COMMERCIAL

## 2021-04-17 DIAGNOSIS — Z23 ENCOUNTER FOR VACCINATION: Primary | ICD-10-CM

## 2021-04-17 PROCEDURE — 91301 MODERNA SARS-COV-2 VACCINE: CPT

## 2021-04-17 PROCEDURE — 0012A MODERNA SARS-COV-2 VACCINE: CPT

## 2021-07-15 ENCOUNTER — OFFICE VISIT (OUTPATIENT)
Dept: URGENT CARE | Facility: CLINIC | Age: 66
End: 2021-07-15
Payer: COMMERCIAL

## 2021-07-15 ENCOUNTER — HOSPITAL ENCOUNTER (OUTPATIENT)
Facility: MEDICAL CENTER | Age: 66
End: 2021-07-15
Attending: PHYSICIAN ASSISTANT
Payer: COMMERCIAL

## 2021-07-15 VITALS
BODY MASS INDEX: 25.19 KG/M2 | HEIGHT: 72 IN | DIASTOLIC BLOOD PRESSURE: 94 MMHG | SYSTOLIC BLOOD PRESSURE: 154 MMHG | HEART RATE: 93 BPM | RESPIRATION RATE: 16 BRPM | OXYGEN SATURATION: 95 % | WEIGHT: 186 LBS | TEMPERATURE: 99.1 F

## 2021-07-15 DIAGNOSIS — R05.9 COUGH: ICD-10-CM

## 2021-07-15 PROBLEM — R41.3 MEMORY DEFICIT: Status: ACTIVE | Noted: 2021-04-30

## 2021-07-15 PROBLEM — G25.2 RESTING TREMOR: Status: ACTIVE | Noted: 2021-04-30

## 2021-07-15 PROBLEM — Z79.899 POLYPHARMACY: Status: ACTIVE | Noted: 2021-07-08

## 2021-07-15 PROBLEM — R63.4 LOSS OF WEIGHT: Status: ACTIVE | Noted: 2021-07-08

## 2021-07-15 PROBLEM — K59.00 CONSTIPATION: Status: ACTIVE | Noted: 2021-07-08

## 2021-07-15 PROBLEM — R53.83 FATIGUE: Status: ACTIVE | Noted: 2021-04-30

## 2021-07-15 PROBLEM — F32.9 MAJOR DEPRESSIVE DISORDER, SINGLE EPISODE, UNSPECIFIED: Status: ACTIVE | Noted: 2021-04-30

## 2021-07-15 PROBLEM — R19.5 ACHOLIC STOOL: Status: ACTIVE | Noted: 2021-07-08

## 2021-07-15 LAB — COVID ORDER STATUS COVID19: NORMAL

## 2021-07-15 PROCEDURE — U0003 INFECTIOUS AGENT DETECTION BY NUCLEIC ACID (DNA OR RNA); SEVERE ACUTE RESPIRATORY SYNDROME CORONAVIRUS 2 (SARS-COV-2) (CORONAVIRUS DISEASE [COVID-19]), AMPLIFIED PROBE TECHNIQUE, MAKING USE OF HIGH THROUGHPUT TECHNOLOGIES AS DESCRIBED BY CMS-2020-01-R: HCPCS

## 2021-07-15 PROCEDURE — 99214 OFFICE O/P EST MOD 30 MIN: CPT | Performed by: PHYSICIAN ASSISTANT

## 2021-07-15 PROCEDURE — U0005 INFEC AGEN DETEC AMPLI PROBE: HCPCS

## 2021-07-15 RX ORDER — AMLODIPINE BESYLATE 5 MG/1
5 TABLET ORAL
COMMUNITY
Start: 2021-06-27 | End: 2022-04-14 | Stop reason: SDUPTHER

## 2021-07-15 ASSESSMENT — ENCOUNTER SYMPTOMS
SHORTNESS OF BREATH: 0
CHILLS: 0
VOMITING: 0
ABDOMINAL PAIN: 0
FEVER: 0
RHINORRHEA: 1
CARDIOVASCULAR NEGATIVE: 1
HEADACHES: 0
DIARRHEA: 0
DIZZINESS: 0
WHEEZING: 0
SINUS PAIN: 0
SORE THROAT: 0
NAUSEA: 0
COUGH: 1

## 2021-07-15 ASSESSMENT — FIBROSIS 4 INDEX: FIB4 SCORE: 0.9

## 2021-07-15 NOTE — PROGRESS NOTES
Subjective:      Bladimir Mitchell is a 65 y.o. male who presents with URI (Coughing, fatigued, loss of smell and taste x 3 weeks)            Cough, congestion, fatigue for the last 2 weeks.  New onset loss of taste and smell.  No fever, chest pain shortness of breath.  Eating and drinking without vomiting or diarrhea.  Patient is vaccinated.  Requesting testing    URI   This is a new problem. The current episode started 1 to 4 weeks ago. The problem has been unchanged. There has been no fever. Associated symptoms include congestion, coughing and rhinorrhea. Pertinent negatives include no abdominal pain, chest pain, diarrhea, ear pain, headaches, nausea, sinus pain, sore throat, vomiting or wheezing. He has tried nothing for the symptoms. The treatment provided no relief.       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  MEDS:   Current Outpatient Medications:   •  docusate sodium (COLACE) 100 MG Cap, Take 100 mg by mouth every day., Disp: , Rfl:   •  amLODIPine (NORVASC) 10 MG Tab, Take 1 Tab by mouth every day., Disp: 90 Tab, Rfl: 3  •  aspirin 81 MG EC tablet, Take 1 Tab by mouth every day., Disp: 90 Tab, Rfl: 3  •  atorvastatin (LIPITOR) 40 MG Tab, Take 1 Tab by mouth every day., Disp: 90 Tab, Rfl: 3  •  potassium chloride SA (KDUR) 20 MEQ Tab CR, Take 1 Tab by mouth 2 times a day., Disp: 90 Tab, Rfl: 0  •  amLODIPine (NORVASC) 5 MG Tab, Take 5 mg by mouth every day., Disp: , Rfl:   •  losartan (COZAAR) 50 MG Tab, TAKE 1 TABLET BY MOUTH EVERY DAY (Patient not taking: Reported on 7/15/2021), Disp: 90 Tab, Rfl: 1  •  polyethylene glycol 3350 (MIRALAX) Powder, Take 17 g by mouth  every day. (Patient not taking: Reported on 7/15/2021), Disp: 3 Bottle, Rfl: 3  ALLERGIES: No Known Allergies  SURGHX:   Past Surgical History:   Procedure Laterality Date   • ABDOMINAL EXPLORATION     • CHOLECYSTECTOMY       SOCHX:  reports that he quit smoking about 16 years ago. His smoking use included cigarettes. He has a 50.00 pack-year smoking history. He has never used smokeless tobacco. He reports previous alcohol use of about 0.6 - 1.2 oz of alcohol per week. He reports that he does not use drugs.  FH: family history includes No Known Problems in his father, maternal grandfather, maternal grandmother, mother, paternal grandfather, and paternal grandmother.    Review of Systems   Constitutional: Negative for chills and fever.   HENT: Positive for congestion and rhinorrhea. Negative for ear pain, sinus pain and sore throat.    Respiratory: Positive for cough. Negative for shortness of breath and wheezing.    Cardiovascular: Negative.  Negative for chest pain.   Gastrointestinal: Negative for abdominal pain, diarrhea, nausea and vomiting.   Neurological: Negative for dizziness and headaches.       Medications, Allergies, and current problem list reviewed today in Epic     Objective:     /94 (BP Location: Left arm, Patient Position: Sitting, BP Cuff Size: Adult long)   Pulse 93   Temp 37.3 °C (99.1 °F) (Temporal)   Resp 16   Ht 1.829 m (6')   Wt 84.4 kg (186 lb)   SpO2 95%   BMI 25.23 kg/m²      Physical Exam  Vitals and nursing note reviewed.   Constitutional:       General: He is not in acute distress.     Appearance: Normal appearance. He is well-developed. He is not ill-appearing, toxic-appearing or diaphoretic.   HENT:      Head: Normocephalic and atraumatic.      Right Ear: Tympanic membrane, ear canal and external ear normal.      Left Ear: Tympanic membrane, ear canal and external ear normal.      Nose: Nose normal. No congestion or rhinorrhea.      Mouth/Throat:      Mouth: Mucous  membranes are moist.      Pharynx: Oropharynx is clear. No oropharyngeal exudate or posterior oropharyngeal erythema.   Eyes:      General:         Right eye: No discharge.         Left eye: No discharge.      Conjunctiva/sclera: Conjunctivae normal.   Cardiovascular:      Rate and Rhythm: Normal rate and regular rhythm.      Pulses: Normal pulses.      Heart sounds: Normal heart sounds. No murmur heard.     Pulmonary:      Effort: Pulmonary effort is normal. No respiratory distress.      Breath sounds: Normal breath sounds. No wheezing, rhonchi or rales.   Chest:      Chest wall: No tenderness.   Musculoskeletal:         General: No swelling or tenderness.      Cervical back: Normal range of motion and neck supple.      Right lower leg: No edema.      Left lower leg: No edema.   Lymphadenopathy:      Cervical: No cervical adenopathy.   Skin:     General: Skin is warm and dry.   Neurological:      Mental Status: He is alert and oriented to person, place, and time.   Psychiatric:         Mood and Affect: Mood normal.         Behavior: Behavior normal.         Thought Content: Thought content normal.         Judgment: Judgment normal.                 Assessment/Plan:         1. Cough  SARS-CoV-2, PCR (In-House): Collect NP OR nasal swab in V[facility]     Cough, congestion, fatigue.  New onset loss of taste and smell.  Denies fever, chest pain, shortness of breath, leg swelling, vomiting, diarrhea.  PO2 adequate lungs clear bilateral that wheezes rhonchi or rales.  Exam benign.  Covid testing initiated.  Quarantine per CDC guidelines.  OTC meds and conservative measures as discussed    Return to clinic or go to ED if symptoms worsen or persist. Indications for ED discussed at length. Patient/Parent/Guardian voices understanding. Follow-up with your primary care provider in 3-5 days. Red flag symptoms discussed. All side effects of medication discussed including allergic response, GI upset, tendon injury, rash, sedation  etc.    Please note that this dictation was created using voice recognition software. I have made every reasonable attempt to correct obvious errors, but I expect that there are errors of grammar and possibly content that I did not discover before finalizing the note.

## 2021-07-16 LAB
SARS-COV-2 RNA RESP QL NAA+PROBE: NOTDETECTED
SPECIMEN SOURCE: NORMAL

## 2021-09-23 ENCOUNTER — OFFICE VISIT (OUTPATIENT)
Dept: NEUROLOGY | Facility: MEDICAL CENTER | Age: 66
End: 2021-09-23
Attending: PSYCHIATRY & NEUROLOGY
Payer: COMMERCIAL

## 2021-09-23 VITALS
HEIGHT: 73 IN | SYSTOLIC BLOOD PRESSURE: 110 MMHG | DIASTOLIC BLOOD PRESSURE: 70 MMHG | HEART RATE: 76 BPM | RESPIRATION RATE: 20 BRPM | OXYGEN SATURATION: 94 % | BODY MASS INDEX: 24.46 KG/M2 | TEMPERATURE: 99.3 F | WEIGHT: 184.53 LBS

## 2021-09-23 DIAGNOSIS — G20.A1 PARKINSON'S DISEASE (HCC): Primary | ICD-10-CM

## 2021-09-23 PROCEDURE — 99204 OFFICE O/P NEW MOD 45 MIN: CPT | Performed by: PSYCHIATRY & NEUROLOGY

## 2021-09-23 PROCEDURE — 99211 OFF/OP EST MAY X REQ PHY/QHP: CPT | Performed by: PSYCHIATRY & NEUROLOGY

## 2021-09-23 ASSESSMENT — FIBROSIS 4 INDEX: FIB4 SCORE: 0.9

## 2021-09-23 NOTE — PROGRESS NOTES
"Chief Complaint   Patient presents with   • New Patient     Parkinsons       History of present illness:  Bladimir Mitchell 65 y.o. male with concern for Parkinson's disease after being seen by [facility] internal medicine.      He has had left leg tremor for the last few months. There is no tremor of the arms.   There is no trouble walking. He is a .   His mother is concerned about his driving however there are no accidents.     Movement-Specific ROS  Anosmia: No   Sleep: \"Decent\". Sleeps at 7-8 and wakes up at 3-4   Speech: No changes per patient with speech   Swallowing: No problems   Constipation: Yes - he has a BM every other day    Urination: No   Dizziness: Not frequently  Hallucinations: Yes - he sees black stuff on the wall or on the ceiling at nighttime    Cognition: He is forgetful.   Excessive daytime somnolence: Sleeps during the day if he is at home. When he is at home he watches TV.   Exercise: No  Anxiety: Yes, due to work related issues.    Balance: Has had 1-2 falls, none recently.     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   • RA (rheumatoid arthritis) (HCC)    • Sleep apnea 3/27/2017       Past surgical history:   Past Surgical History:   Procedure Laterality Date   • ABDOMINAL EXPLORATION     • CHOLECYSTECTOMY         Family history:   Family History   Problem Relation Age of Onset   • No Known Problems Mother    • No Known Problems Father    • No Known Problems Maternal Grandmother    • No Known Problems Maternal Grandfather    • No Known Problems Paternal Grandmother    • No Known Problems Paternal Grandfather        Social history:   Social History     Socioeconomic " History   • Marital status: Single     Spouse name: Not on file   • Number of children: Not on file   • Years of education: Not on file   • Highest education level: Not on file   Occupational History   • Not on file   Tobacco Use   • Smoking status: Former Smoker     Packs/day: 2.00     Years: 25.00     Pack years: 50.00     Types: Cigarettes     Quit date:      Years since quittin.7   • Smokeless tobacco: Never Used   Vaping Use   • Vaping Use: Never used   Substance and Sexual Activity   • Alcohol use: Not Currently     Alcohol/week: 0.6 - 1.2 oz     Types: 1 - 2 Shots of liquor per week     Comment: min   • Drug use: No   • Sexual activity: Yes   Other Topics Concern   • Not on file   Social History Narrative   • Not on file     Social Determinants of Health     Financial Resource Strain:    • Difficulty of Paying Living Expenses:    Food Insecurity:    • Worried About Running Out of Food in the Last Year:    • Ran Out of Food in the Last Year:    Transportation Needs:    • Lack of Transportation (Medical):    • Lack of Transportation (Non-Medical):    Physical Activity:    • Days of Exercise per Week:    • Minutes of Exercise per Session:    Stress:    • Feeling of Stress :    Social Connections:    • Frequency of Communication with Friends and Family:    • Frequency of Social Gatherings with Friends and Family:    • Attends Shinto Services:    • Active Member of Clubs or Organizations:    • Attends Club or Organization Meetings:    • Marital Status:    Intimate Partner Violence:    • Fear of Current or Ex-Partner:    • Emotionally Abused:    • Physically Abused:    • Sexually Abused:        Current medications:   Current Outpatient Medications   Medication   • carbidopa-levodopa (SINEMET)  MG Tab   • docusate sodium (COLACE) 100 MG Cap   • amLODIPine (NORVASC) 10 MG Tab   • aspirin 81 MG EC tablet   • atorvastatin (LIPITOR) 40 MG Tab   • amLODIPine (NORVASC) 5 MG Tab   • losartan (COZAAR) 50  "MG Tab   • polyethylene glycol 3350 (MIRALAX) Powder   • potassium chloride SA (KDUR) 20 MEQ Tab CR     No current facility-administered medications for this visit.       Medication Allergy:  No Known Allergies    Physical examination:   Vitals:    09/23/21 1245 09/23/21 1249   BP: 120/70 110/70   BP Location: Left arm Left arm   Patient Position: Sitting Standing   Pulse: 76    Resp: 20    Temp: 37.4 °C (99.3 °F)    TempSrc: Temporal    SpO2: 94%    Weight: 83.7 kg (184 lb 8.4 oz)    Height: 1.854 m (6' 1\")      Neurological Exam  Mental Status  Awake and alert. Language is fluent with no aphasia.  +Hypophonia  +Hypomimia.    Cranial Nerves  CN III, IV, VI: Extraocular movements intact bilaterally. No nystagmus. Increase latency of downward saccade initiation. Normal smooth pursuit.    Motor   Increased muscle tone. Bilateral upper extremity mild rigidity. The following abnormal movements were seen: Bilateral L>R lower extremity rest tremor. Left leg tremor is moderate.   Intermittent right upper extremity rest tremor..  Decreased speed of finger tapping and hand opening bilaterally.  .    Coordination  Right: Finger-to-nose normal.  Left: Finger-to-nose normal.    Gait  Casual gait: Narrow stance. Reduced stride length. Shuffling gait.      Labs:  I reviewed the following labs personally:  None     Imaging:   None     ASSESSMENT AND PLAN:  Problem List Items Addressed This Visit     Parkinson's disease (HCC) - Primary    Relevant Medications    carbidopa-levodopa (SINEMET)  MG Tab    Other Relevant Orders    REFERRAL TO OCCUPATIONAL THERAPY          1. Parkinson's disease (HCC)  - REFERRAL TO OCCUPATIONAL THERAPY  - carbidopa-levodopa (SINEMET)  MG Tab; Take 1 Tablet by mouth 3 times a day.  Dispense: 270 Tablet; Refill: 2    I have counseled the patient and his mother that his exam is consistent with Parkinson's disease. I have started him on carbidopa/levodopa. Due to concern from his mother " regarding his driving ability, I have referred him to occupational therapy for a driving evaluation.     FOLLOW-UP:   Return in about 3 months (around 12/23/2021).    Total time spent for the day for this patient unrelated to procedure time is: 30 minutes. I spent 26 minutes in face to face time and I spent 2 minutes pre-charting and 2 minutes in post-visit documentation.      Dr. Kuldeep Phipps D.O.  [facility] Neu[facility]   Movement Disorders Specialist

## 2021-09-23 NOTE — PATIENT INSTRUCTIONS
Start carbidopa/levodopa 1 tab 3x/day for treatment of tremor and slow movement.   Start an exercise program where you exercise 3-4 times/week at a moderate intensity.

## 2021-10-19 ENCOUNTER — APPOINTMENT (OUTPATIENT)
Dept: INTERNAL MEDICINE | Facility: OTHER | Age: 66
End: 2021-10-19
Payer: COMMERCIAL

## 2021-11-06 DIAGNOSIS — I10 ESSENTIAL HYPERTENSION: ICD-10-CM

## 2021-11-08 NOTE — TELEPHONE ENCOUNTER
Last seen: 8/13/2021 by Dr. Chou  Next appt: None  Was the patient seen in the last year in this department? Yes   Does patient have an active prescription for medications requested? No   Received Request Via: Pharmacy

## 2021-11-10 ENCOUNTER — TELEPHONE (OUTPATIENT)
Dept: HEALTH INFORMATION MANAGEMENT | Facility: OTHER | Age: 66
End: 2021-11-10

## 2021-11-10 NOTE — TELEPHONE ENCOUNTER
Outcome: Warmed transferred to [facility] to schedule in home assessment for QSHA.     Please transfer to Patient Outreach Team at [phone] when patient returns call.

## 2021-11-12 RX ORDER — LOSARTAN POTASSIUM 100 MG/1
TABLET ORAL
Qty: 90 TABLET | Refills: 0 | Status: SHIPPED | OUTPATIENT
Start: 2021-11-12 | End: 2022-04-14 | Stop reason: SDUPTHER

## 2021-12-01 PROBLEM — R25.1 TREMOR DUE TO DISORDER OF CNS: Status: ACTIVE | Noted: 2021-12-01

## 2021-12-01 PROBLEM — E80.6 HYPERBILIRUBINEMIA: Status: ACTIVE | Noted: 2021-12-01

## 2021-12-01 PROBLEM — R73.03 PREDIABETES: Status: ACTIVE | Noted: 2021-12-01

## 2021-12-01 PROBLEM — G96.9 TREMOR DUE TO DISORDER OF CNS: Status: ACTIVE | Noted: 2021-12-01

## 2021-12-01 PROBLEM — R15.9 FECAL INCONTINENCE: Status: ACTIVE | Noted: 2021-12-01

## 2022-01-18 ENCOUNTER — OFFICE VISIT (OUTPATIENT)
Dept: NEUROLOGY | Facility: MEDICAL CENTER | Age: 67
End: 2022-01-18
Attending: PSYCHIATRY & NEUROLOGY
Payer: COMMERCIAL

## 2022-01-18 VITALS
TEMPERATURE: 99.1 F | HEIGHT: 71 IN | BODY MASS INDEX: 26.3 KG/M2 | WEIGHT: 187.83 LBS | RESPIRATION RATE: 20 BRPM | HEART RATE: 86 BPM | DIASTOLIC BLOOD PRESSURE: 80 MMHG | OXYGEN SATURATION: 94 % | SYSTOLIC BLOOD PRESSURE: 118 MMHG

## 2022-01-18 DIAGNOSIS — G20.A1 PARKINSON'S DISEASE (HCC): ICD-10-CM

## 2022-01-18 PROCEDURE — 99214 OFFICE O/P EST MOD 30 MIN: CPT | Performed by: PSYCHIATRY & NEUROLOGY

## 2022-01-18 PROCEDURE — 99212 OFFICE O/P EST SF 10 MIN: CPT | Performed by: PSYCHIATRY & NEUROLOGY

## 2022-01-18 NOTE — PATIENT INSTRUCTIONS
Carbidopa/levodopa 1.5 tabs 3 times daily.  If there is insufficient improvement after 2 weeks, increase the carbidopa/levodopa again to 2 tabs 3 times daily.

## 2022-01-18 NOTE — PROGRESS NOTES
"Chief Complaint   Patient presents with   • Follow-Up     Parkinson Disease       History of present illness:  Bladimir Mitchell 66 y.o. male with Parkinson's disease.  Last visit on 9/23/2021, I started him on carbidopa/levodopa. There is no change with starting carbidopa/levodopa.     Movement-Specific ROS  Anosmia: No   Sleep: \"Decent\". Sleeps at 7-8 and wakes up at 3-4   Speech: No changes per patient with speech   Swallowing: No problems   Constipation: Yes - he has a BM every other day    Urination: No   Dizziness: Not frequently  Hallucinations: Yes - he sees black stuff on the wall or on the ceiling at nighttime    Cognition: He is forgetful.   Excessive daytime somnolence: Sleeps during the day if he is at home. When he is at home he watches TV.   Exercise: No  Anxiety: Yes, due to work related issues.    Balance: Has had 1-2 falls, none recently.      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   • RA (rheumatoid arthritis) (HCC)    • Sleep apnea 3/27/2017       Past surgical history:   Past Surgical History:   Procedure Laterality Date   • ABDOMINAL EXPLORATION     • CHOLECYSTECTOMY         Family history:   Family History   Problem Relation Age of Onset   • No Known Problems Mother    • No Known Problems Father    • No Known Problems Maternal Grandmother    • No Known Problems Maternal Grandfather    • No Known Problems Paternal Grandmother    • No Known Problems Paternal Grandfather        Social history:   Social History     Socioeconomic History   • Marital status: Single     Spouse name: Not on file   • Number of children: Not on file   • Years of education: Not on file   • Highest education " level: Not on file   Occupational History   • Not on file   Tobacco Use   • Smoking status: Former Smoker     Packs/day: 2.00     Years: 25.00     Pack years: 50.00     Types: Cigarettes     Quit date:      Years since quittin.0   • Smokeless tobacco: Never Used   Vaping Use   • Vaping Use: Never used   Substance and Sexual Activity   • Alcohol use: Not Currently     Alcohol/week: 0.6 - 1.2 oz     Types: 1 - 2 Shots of liquor per week     Comment: min   • Drug use: No   • Sexual activity: Yes   Other Topics Concern   • Not on file   Social History Narrative   • Not on file     Social Determinants of Health     Financial Resource Strain:    • Difficulty of Paying Living Expenses: Not on file   Food Insecurity:    • Worried About Running Out of Food in the Last Year: Not on file   • Ran Out of Food in the Last Year: Not on file   Transportation Needs:    • Lack of Transportation (Medical): Not on file   • Lack of Transportation (Non-Medical): Not on file   Physical Activity:    • Days of Exercise per Week: Not on file   • Minutes of Exercise per Session: Not on file   Stress:    • Feeling of Stress : Not on file   Social Connections:    • Frequency of Communication with Friends and Family: Not on file   • Frequency of Social Gatherings with Friends and Family: Not on file   • Attends Evangelical Services: Not on file   • Active Member of Clubs or Organizations: Not on file   • Attends Club or Organization Meetings: Not on file   • Marital Status: Not on file   Intimate Partner Violence:    • Fear of Current or Ex-Partner: Not on file   • Emotionally Abused: Not on file   • Physically Abused: Not on file   • Sexually Abused: Not on file   Housing Stability:    • Unable to Pay for Housing in the Last Year: Not on file   • Number of Places Lived in the Last Year: Not on file   • Unstable Housing in the Last Year: Not on file       Current medications:   Current Outpatient Medications   Medication   •  "carbidopa-levodopa (SINEMET)  MG Tab   • losartan (COZAAR) 100 MG Tab   • amLODIPine (NORVASC) 5 MG Tab   • docusate sodium (COLACE) 100 MG Cap   • atorvastatin (LIPITOR) 40 MG Tab   • potassium chloride SA (KDUR) 20 MEQ Tab CR   • polyethylene glycol 3350 (MIRALAX) Powder   • aspirin 81 MG EC tablet     No current facility-administered medications for this visit.       Medication Allergy:  No Known Allergies    Physical examination:   Vitals:    01/18/22 1337 01/18/22 1341   BP: 140/86 118/80   BP Location: Left arm Left arm   Patient Position: Sitting Standing   BP Cuff Size: Adult Adult   Pulse: 81 86   Resp: 20    Temp: 37.3 °C (99.1 °F)    TempSrc: Temporal    SpO2: 92% 94%   Weight: 85.2 kg (187 lb 13.3 oz)    Height: 1.803 m (5' 11\")      Neurological Exam  Mental Status  Awake and alert. Speech is normal. Language is fluent with no aphasia.    Motor   Increased muscle tone. Right arm rigidity.. The following abnormal movements were seen: Right greater than left resting tremor is present.  Tremor is moderate..  Diminutive finger tapping is present on the right..    Gait  Casual gait: Reduced right arm swing. Normal left arm swing.  Mild gait bradykinesia..       Labs:  I reviewed the following labs personally:  None    Imaging:   None     ASSESSMENT AND PLAN:  Problem List Items Addressed This Visit     Parkinson's disease (HCC)    Relevant Medications    carbidopa-levodopa (SINEMET)  MG Tab          1. Parkinson's disease (HCC)  - carbidopa-levodopa (SINEMET)  MG Tab; Take 1.5 Tablets by mouth 3 times a day.  Dispense: 405 Tablet; Refill: 2    66-year-old male with Parkinson's disease.  Due to desire to abolish tremor, I have avoided instructions to increase his carbidopa/levodopa to 2 tablets 3 times daily.  If this dosage is ineffective increasing to 253 times a day would be the next step however he will contact me prior to this if 600 mg/day is not effective.  Aside from the tremor the " patient is doing well and has mild bradykinesia.    FOLLOW-UP:   Return in about 6 months (around 7/18/2022).    VINEET LawO.  [facility]

## 2022-01-24 ENCOUNTER — TELEPHONE (OUTPATIENT)
Dept: NEUROLOGY | Facility: MEDICAL CENTER | Age: 67
End: 2022-01-24

## 2022-01-24 DIAGNOSIS — G20.A1 PARKINSON'S DISEASE (HCC): ICD-10-CM

## 2022-01-24 NOTE — TELEPHONE ENCOUNTER
Patient mom called to inform Dr. Phipps that patient started taking 1 1/2 tablet 3 times daily of the Carbidopa- Levodopa. Had an episode last weekend. Was feeling really nervous and breathing really hard and shaky. Please advise on what to do. Mom is waiting for a phone call.JOSE Galindo.

## 2022-01-28 ENCOUNTER — TELEPHONE (OUTPATIENT)
Dept: OTHER | Facility: MEDICAL CENTER | Age: 67
End: 2022-01-28

## 2022-01-28 NOTE — TELEPHONE ENCOUNTER
Patient's mother spoke to me on the phone.  Since increasing carbidopa/levodopa to 1-1/2 pills 3 times daily he has been acting confused and forgot to  his mother after a medical appointment.  This is unlike him.  Also the tremors are not improved on 1-1/2 tabs.  I counseled her to reduce his Sinemet to 1 pill 3 times daily.  Is what he was taking previously and although there was intermittent tremor on this dosage he was overall better at a lower dosage of this.    Dr. Kuldeep Phipps D.O.  [facility]

## 2022-01-31 ENCOUNTER — TELEPHONE (OUTPATIENT)
Dept: NEUROLOGY | Facility: MEDICAL CENTER | Age: 67
End: 2022-01-31

## 2022-01-31 NOTE — TELEPHONE ENCOUNTER
Patient mother called wanted to speak with you ASAP regarding the patient. State that the patient is not being himself and not in his right mind. Waiting for your call please. Phone number [phone].

## 2022-02-01 NOTE — TELEPHONE ENCOUNTER
Spoke to the patient's mother.  Bladimir is feeling better after lowering his dose of carbidopa/levodopa.

## 2022-04-11 ENCOUNTER — TELEPHONE (OUTPATIENT)
Dept: NEUROLOGY | Facility: MEDICAL CENTER | Age: 67
End: 2022-04-11
Payer: MEDICARE

## 2022-04-11 NOTE — TELEPHONE ENCOUNTER
Patient mom Tania called to inform Dr. Phipps that patient is still having side effects on the lower dose of Carbidopa.State still being forgetful ,not on his right mind, and saying different things. It is happening on and off. Mom wanted a call back for advise on what to do please. Thank you.JOSE Galindo.

## 2022-04-12 NOTE — TELEPHONE ENCOUNTER
I called the patient's mother.  Every couple of days, Bldaimir will have strange episodes where he is not acting himself and is forgetful.  Most of the time, he is fine.  I have requested that the schedulers change him to a earlier appointment time to evaluate.

## 2022-04-14 ENCOUNTER — OFFICE VISIT (OUTPATIENT)
Dept: INTERNAL MEDICINE | Facility: OTHER | Age: 67
End: 2022-04-14
Payer: MEDICARE

## 2022-04-14 ENCOUNTER — TELEPHONE (OUTPATIENT)
Dept: NEUROLOGY | Facility: MEDICAL CENTER | Age: 67
End: 2022-04-14
Payer: MEDICARE

## 2022-04-14 VITALS
TEMPERATURE: 98.5 F | HEART RATE: 86 BPM | BODY MASS INDEX: 25.63 KG/M2 | SYSTOLIC BLOOD PRESSURE: 125 MMHG | OXYGEN SATURATION: 94 % | DIASTOLIC BLOOD PRESSURE: 73 MMHG | HEIGHT: 73 IN | WEIGHT: 193.4 LBS

## 2022-04-14 DIAGNOSIS — E78.5 DYSLIPIDEMIA: ICD-10-CM

## 2022-04-14 DIAGNOSIS — I10 ESSENTIAL HYPERTENSION: ICD-10-CM

## 2022-04-14 DIAGNOSIS — I71.40 ABDOMINAL AORTIC ANEURYSM (AAA) WITHOUT RUPTURE (HCC): ICD-10-CM

## 2022-04-14 DIAGNOSIS — G20.A1 PARKINSON'S DISEASE (HCC): ICD-10-CM

## 2022-04-14 DIAGNOSIS — K59.09 OTHER CONSTIPATION: ICD-10-CM

## 2022-04-14 DIAGNOSIS — R73.01 IMPAIRED FASTING GLUCOSE: ICD-10-CM

## 2022-04-14 DIAGNOSIS — I72.3 ANEURYSM OF LEFT COMMON ILIAC ARTERY (HCC): ICD-10-CM

## 2022-04-14 PROCEDURE — 99214 OFFICE O/P EST MOD 30 MIN: CPT | Mod: GC | Performed by: STUDENT IN AN ORGANIZED HEALTH CARE EDUCATION/TRAINING PROGRAM

## 2022-04-14 RX ORDER — AMLODIPINE BESYLATE 5 MG/1
5 TABLET ORAL
Qty: 90 TABLET | Refills: 3 | Status: SHIPPED | OUTPATIENT
Start: 2022-04-14 | End: 2022-06-27

## 2022-04-14 RX ORDER — LIDOCAINE 4 G/G
1 PATCH TOPICAL
Qty: 30 PATCH | Refills: 0 | Status: SHIPPED | OUTPATIENT
Start: 2022-04-14 | End: 2022-06-27

## 2022-04-14 RX ORDER — BLOOD PRESSURE TEST KIT
1 KIT MISCELLANEOUS ONCE
Qty: 1 KIT | Refills: 0 | Status: SHIPPED | OUTPATIENT
Start: 2022-04-14 | End: 2022-04-14

## 2022-04-14 RX ORDER — LOSARTAN POTASSIUM 100 MG/1
100 TABLET ORAL
Qty: 90 TABLET | Refills: 0 | Status: SHIPPED | OUTPATIENT
Start: 2022-04-14

## 2022-04-14 ASSESSMENT — PATIENT HEALTH QUESTIONNAIRE - PHQ9
4. FEELING TIRED OR HAVING LITTLE ENERGY: SEVERAL DAYS
SUM OF ALL RESPONSES TO PHQ QUESTIONS 1-9: 5
2. FEELING DOWN, DEPRESSED, IRRITABLE, OR HOPELESS: NOT AT ALL
6. FEELING BAD ABOUT YOURSELF - OR THAT YOU ARE A FAILURE OR HAVE LET YOURSELF OR YOUR FAMILY DOWN: NOT AL ALL
7. TROUBLE CONCENTRATING ON THINGS, SUCH AS READING THE NEWSPAPER OR WATCHING TELEVISION: NOT AT ALL
1. LITTLE INTEREST OR PLEASURE IN DOING THINGS: MORE THAN HALF THE DAYS
3. TROUBLE FALLING OR STAYING ASLEEP OR SLEEPING TOO MUCH: NOT AT ALL
8. MOVING OR SPEAKING SO SLOWLY THAT OTHER PEOPLE COULD HAVE NOTICED. OR THE OPPOSITE, BEING SO FIGETY OR RESTLESS THAT YOU HAVE BEEN MOVING AROUND A LOT MORE THAN USUAL: MORE THAN HALF THE DAYS
9. THOUGHTS THAT YOU WOULD BE BETTER OFF DEAD, OR OF HURTING YOURSELF: NOT AT ALL
5. POOR APPETITE OR OVEREATING: NOT AT ALL
SUM OF ALL RESPONSES TO PHQ9 QUESTIONS 1 AND 2: 2

## 2022-04-14 ASSESSMENT — ENCOUNTER SYMPTOMS
BLOOD IN STOOL: 0
DIZZINESS: 0
ABDOMINAL PAIN: 0
CONSTIPATION: 1
BACK PAIN: 1

## 2022-04-14 ASSESSMENT — LIFESTYLE VARIABLES: SUBSTANCE_ABUSE: 0

## 2022-04-14 NOTE — ASSESSMENT & PLAN NOTE
Today in the office blood pressure is normal and appears well controlled on losartan 100 and amlodipine 5, understandably was increased while he was in stressful situation  Recommended to obtain blood pressure cuff and continue monitoring at home

## 2022-04-14 NOTE — PATIENT INSTRUCTIONS
Please see the parkinson's association books for help with the symptoms.    Please get lab tests done one week before next visit.     Please follow up with Neurology as soon as possible for your parkinsons symptoms and ask about an adjunct therapy for depression/anxiety    For anxiety please get a oxygen monitor check your oxygen, normal is above 90%. Breath deeply and slowly, 4 seconds in and 4 seconds out.    Please get a blood pressure monitor and check your blood pressure every few days.    Please use topical therapy (bengay, salonpas) for back pain and use lidocaine patch as necessary.    [email or web address]    Call Our HELPLINE for parkinsons: [phone] ([phone])    Please use metamucil daily, prune juice and increase your fiber intake for your constipation.  Constipation, Adult  Constipation is when a person has fewer bowel movements in a week than normal, has difficulty having a bowel movement, or has stools that are dry, hard, or larger than normal. Constipation may be caused by an underlying condition. It may become worse with age if a person takes certain medicines and does not take in enough fluids.  Follow these instructions at home:  Eating and drinking    · Eat foods that have a lot of fiber, such as fresh fruits and vegetables, whole grains, and beans.  · Limit foods that are high in fat, low in fiber, or overly processed, such as french fries, hamburgers, cookies, candies, and soda.  · Drink enough fluid to keep your urine clear or pale yellow.  General instructions  · Exercise regularly or as told by your health care provider.  · Go to the restroom when you have the urge to go. Do not hold it in.  · Take over-the-counter and prescription medicines only as told by your health care provider. These include any fiber supplements.  · Practice pelvic floor retraining exercises, such as deep breathing while relaxing the lower abdomen  and pelvic floor relaxation during bowel movements.  · Watch your condition for any changes.  · Keep all follow-up visits as told by your health care provider. This is important.  Contact a health care provider if:  · You have pain that gets worse.  · You have a fever.  · You do not have a bowel movement after 4 days.  · You vomit.  · You are not hungry.  · You lose weight.  · You are bleeding from the anus.  · You have thin, pencil-like stools.  Get help right away if:  · You have a fever and your symptoms suddenly get worse.  · You leak stool or have blood in your stool.  · Your abdomen is bloated.  · You have severe pain in your abdomen.  · You feel dizzy or you faint.  This information is not intended to replace advice given to you by your health care provider. Make sure you discuss any questions you have with your health care provider.  Document Released: 09/15/2005 Document Revised: 11/30/2018 Document Reviewed: 06/07/2017  [employer] Patient Education © 2020 [employer]

## 2022-04-14 NOTE — ASSESSMENT & PLAN NOTE
Has bowel movements daily but still feels constipated, taking docusate every day, recommended increasing fiber intake and given instructions on this as well as prune juice explained the association of Parkinson's with constipation.

## 2022-04-14 NOTE — TELEPHONE ENCOUNTER
Mom called wanted Dr. Phipps to know that the patient was having an anxiety attack last night and had to call [facility] two times. Did not go to the hospital.JOSE Galindo.

## 2022-04-14 NOTE — PROGRESS NOTES
"4/14/2022  8:24 AM    Chief Complaint   Patient presents with   • [facility] Follow[facility]     [facility] Follow up    • Medication Reaction     Pt has medication changes   • Shortness of Breath     Pt had ambulance come out twice  yesterday as pt felt like he was unable to breathe, they did not take him to the hospital, they told him it was anxiety. Pts -didn't get the diastolic    • Depression     PT going to Dr. Phipps? For the parkinson's        History of Present Illness:  66 y.o. male established patient who presents today for a followup visit.  He presents with his mother Tania and ex-wife.    He was diagnosed with Parkinson's approximately 6 months ago and referred to neurology for which she was started on Sinemet which improved his symptoms for time he went from 1 to 1.5 pills daily and started having symptoms of forgetfulness and being off, left his mother at an outside location and drove back home without knowing it,, went back down to 1 pill.  Takes it at 8 AM 2 PM and 8 PM.    Yesterday had 2 episodes of feeling like he was \"drowning\", said he stopped breathing \"one breath or 2\", the first episode when he woke up from sleep mother said he looked red and was belly breathing, improved over short time and did not call EMS second time he was awake and had same symptoms out of the blue, EMS was called and they evaluated him pulse oximetry was normal greater than 90% blood pressure 160s over 90s, did not take him to the hospital.  They stated this was an anxiety attack and recommended he follow-up with us. There is no wheezing or syncope.  Having some back pain which limits his ability to sit.  The episodes happen in the afternoon at 1 PM and in the evening around the time he was supposed to take his next dose of the Sinemet.  Mother notes his tremors worsened during these episodes.    He had to retire from his job as a  and per his mother not an easy transition, " still sometimes wakes up early in the morning and takes showers if he is about to go to work and being at home all day has been a dramatic change, used to like riding motorcycles now not able to do this. Does not feel depressed per se.      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 12/01/2021   • Fecal incontinence 12/01/2021   • Parkinson's disease (HCC) 09/23/2021   • Acholic stool 07/08/2021   • Constipation 07/08/2021   • Loss of weight 07/08/2021   • Polypharmacy 07/08/2021   • Fatigue 04/30/2021   • Major depressive disorder, single episode, unspecified 04/30/2021   • Memory deficit 04/30/2021   • Aneurysm of left common iliac artery ([facility]) 08/27/2020   • Aortic atherosclerosis ([facility]) 08/27/2020   • Abdominal aortic aneurysm (AAA) without rupture ([facility]) 08/27/2020   • Encounter for general adult medical examination without abnormal findings 04/28/2017   • Essential hypertension 03/29/2017   • Neck pain 03/27/2017   • Abnormal liver function tests 03/27/2017   • 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    Current Outpatient Medications   Medication Sig Dispense Refill   • Blood Pressure Kit 1 Each one time for 1 dose. 1 Kit 0   • Lidocaine 4 % Patch Apply 1 Patch topically 1 time a day as needed (apply to mid back once a day for pain). 30 Patch 0   • amLODIPine (NORVASC) 5 MG Tab Take 1 Tablet by mouth every day. 90 Tablet 3   • losartan (COZAAR) 100 MG Tab Take 1 Tablet by mouth every day. 90 Tablet 0   • carbidopa-levodopa (SINEMET)  MG Tab Take 1 Tablet by mouth 3 times a day. 270 Tablet 2   • docusate sodium (COLACE) 100 MG Cap Take 100 mg by mouth every day.     • atorvastatin (LIPITOR) 40 MG Tab Take 1 Tab by mouth every day. 90 Tab 3     No current facility-administered medications for this visit.       Social History     Tobacco  "Use   • Smoking status: Former Smoker     Packs/day: 2.00     Years: 25.00     Pack years: 50.00     Types: Cigarettes     Quit date:      Years since quittin.2   • Smokeless tobacco: Never Used   Vaping Use   • Vaping Use: Never used   Substance Use Topics   • Alcohol use: Not Currently     Alcohol/week: 0.6 - 1.2 oz     Types: 1 - 2 Shots of liquor per week     Comment: min   • Drug use: No       Family History   Problem Relation Age of Onset   • No Known Problems Mother    • No Known Problems Father    • No Known Problems Maternal Grandmother    • No Known Problems Maternal Grandfather    • No Known Problems Paternal Grandmother    • No Known Problems Paternal Grandfather        Review of Systems   Gastrointestinal: Positive for constipation. Negative for abdominal pain and blood in stool.   Musculoskeletal: Positive for back pain.   Neurological: Negative for dizziness.   Psychiatric/Behavioral: Negative for substance abuse.     See HPI.    /73 (BP Location: Left arm, Patient Position: Sitting, BP Cuff Size: Adult)   Pulse 86   Temp 36.9 °C (98.5 °F) (Temporal)   Ht 1.854 m (6' 1\")   Wt 87.7 kg (193 lb 6.4 oz)   SpO2 94%  Body mass index is 25.52 kg/m².  Physical Exam  Vitals and nursing note reviewed.   Constitutional:       Appearance: Normal appearance. He is not toxic-appearing.      Comments: thin   HENT:      Head: Normocephalic and atraumatic.   Cardiovascular:      Rate and Rhythm: Normal rate and regular rhythm.      Pulses: Normal pulses.      Heart sounds: Normal heart sounds. No murmur heard.    No friction rub. No gallop.   Pulmonary:      Effort: Pulmonary effort is normal. No respiratory distress.      Breath sounds: Normal breath sounds. No wheezing or rales.   Abdominal:      Palpations: Abdomen is soft.   Musculoskeletal:         General: No swelling or tenderness. Normal range of motion.      Right lower leg: No edema.      Left lower leg: No edema.   Skin:     General: " Skin is warm and dry.      Findings: No lesion or rash.   Neurological:      Mental Status: He is alert.      Gait: Gait normal.      Comments: Resting tremor   Psychiatric:         Mood and Affect: Mood normal.         Behavior: Behavior normal.          Assessment/Plan:     Parkinson's disease (HCC)  His behavioral and anxiety symptoms appear to be correlated with the timing of the next Sinemet dose and could represent a wearing off phenomenon, counseled patient and his mother to closely follow-up with neurology for question of timing and dose of the medication.  Explained the nature of Parkinson's disease which can be associated with anxiety, depression, behavioral changes, or agitation.    May benefit from adjunct SSRI/SNRI for anxiety and depression will defer to neurology for the choice of the best medication.    He has good support from his mother and also ex-wife who was present at the visit, directed them to the Parkinson's foundation for literature on education of the disease and lifestyle changes, routine modification,, sleep management that they can use to improve his quality of life and possibly the behavioral symptoms as well.    We will check TSH as well    Constipation  Has bowel movements daily but still feels constipated, taking docusate every day, recommended increasing fiber intake and given instructions on this as well as prune juice explained the association of Parkinson's with constipation.    Essential hypertension  Today in the office blood pressure is normal and appears well controlled on losartan 100 and amlodipine 5, understandably was increased while he was in stressful situation  Recommended to obtain blood pressure cuff and continue monitoring at home    Dyslipidemia  Well-controlled with current dose of atorvastatin, recheck lipid profile for next visit    Abdominal aortic aneurysm (AAA) without rupture (HCC)  Found on CT done for separate reasons in 2020, abdominal aortic aneurysm  3.3 cm and iliac aneurysm 2 or 2 cm.  Was referred to vascular surgery but had not followed up.  Ordered ultrasound of aorta and iliacs as recommended for surveillance every 3 years per Society vascular surgery    Orders Placed This Encounter   • US-AORTA/ILIACS DUPLEX COMPLETE   • CBC WITH DIFFERENTIAL   • Comp Metabolic Panel   • Lipid Profile   • TSH WITH REFLEX TO FT4   • HEMOGLOBIN A1C   • Blood Pressure Kit   • Lidocaine 4 % Patch   • amLODIPine (NORVASC) 5 MG Tab   • losartan (COZAAR) 100 MG Tab       The major risks of all medications prescribed were explained and any questions answered.    All imaging results and lab results and consult notes are reviewed at this visit.  Followup: Return in about 3 months (around 7/14/2022).

## 2022-04-14 NOTE — ASSESSMENT & PLAN NOTE
Found on CT done for separate reasons in 2020, abdominal aortic aneurysm 3.3 cm and iliac aneurysm 2 or 2 cm.  Was referred to vascular surgery but had not followed up.  Ordered ultrasound of aorta and iliacs as recommended for surveillance every 3 years per Society vascular surgery

## 2022-04-14 NOTE — ASSESSMENT & PLAN NOTE
His behavioral and anxiety symptoms appear to be correlated with the timing of the next Sinemet dose and could represent a wearing off phenomenon, counseled patient and his mother to closely follow-up with neurology for question of timing and dose of the medication.  Explained the nature of Parkinson's disease which can be associated with anxiety, depression, behavioral changes, or agitation.    May benefit from adjunct SSRI/SNRI for anxiety and depression will defer to neurology for the choice of the best medication.    He has good support from his mother and also ex-wife who was present at the visit, directed them to the Parkinson's foundation for literature on education of the disease and lifestyle changes, routine modification,, sleep management that they can use to improve his quality of life and possibly the behavioral symptoms as well.    We will check TSH as well

## 2022-04-15 ENCOUNTER — TELEPHONE (OUTPATIENT)
Dept: INTERNAL MEDICINE | Facility: OTHER | Age: 67
End: 2022-04-15

## 2022-04-15 ENCOUNTER — OFFICE VISIT (OUTPATIENT)
Dept: NEUROLOGY | Facility: MEDICAL CENTER | Age: 67
End: 2022-04-15
Attending: PSYCHIATRY & NEUROLOGY
Payer: MEDICARE

## 2022-04-15 VITALS
DIASTOLIC BLOOD PRESSURE: 78 MMHG | HEART RATE: 71 BPM | RESPIRATION RATE: 16 BRPM | HEIGHT: 73 IN | OXYGEN SATURATION: 98 % | WEIGHT: 195.33 LBS | TEMPERATURE: 98.4 F | BODY MASS INDEX: 25.89 KG/M2 | SYSTOLIC BLOOD PRESSURE: 130 MMHG

## 2022-04-15 DIAGNOSIS — G20.A1 PARKINSON'S DISEASE (HCC): ICD-10-CM

## 2022-04-15 PROCEDURE — 99212 OFFICE O/P EST SF 10 MIN: CPT | Performed by: PSYCHIATRY & NEUROLOGY

## 2022-04-15 PROCEDURE — 99215 OFFICE O/P EST HI 40 MIN: CPT | Performed by: PSYCHIATRY & NEUROLOGY

## 2022-04-15 RX ORDER — ROPINIROLE 0.25 MG/1
TABLET, FILM COATED ORAL
Qty: 126 TABLET | Refills: 0 | Status: SHIPPED | OUTPATIENT
Start: 2022-04-15 | End: 2022-05-03

## 2022-04-15 RX ORDER — ROPINIROLE 1 MG/1
1 TABLET, FILM COATED ORAL 3 TIMES DAILY
Qty: 90 TABLET | Refills: 2 | Status: SHIPPED | OUTPATIENT
Start: 2022-05-12 | End: 2022-05-04 | Stop reason: SINTOL

## 2022-04-15 ASSESSMENT — PATIENT HEALTH QUESTIONNAIRE - PHQ9
5. POOR APPETITE OR OVEREATING: 0 - NOT AT ALL
CLINICAL INTERPRETATION OF PHQ2 SCORE: 5
SUM OF ALL RESPONSES TO PHQ QUESTIONS 1-9: 8

## 2022-04-15 ASSESSMENT — PAIN SCALES - GENERAL: PAINLEVEL: 9=SEVERE PAIN

## 2022-04-15 NOTE — TELEPHONE ENCOUNTER
DOCUMENTATION OF PAR STATUS:    1. Name of Medication & Dose: Lidocaine Patch    2. Name of Prescription Coverage Company & phone #: [employer]    3. Date Prior Auth Submitted: 05/15/2022    4. What information was given to obtain insurance decision? Clinical Notes     5. Prior Auth Letter Approved or Denied? Pending     6. Action Taken: Pharmacy/Patient Notified: No

## 2022-04-15 NOTE — PROGRESS NOTES
"Chief Complaint   Patient presents with   • Follow-Up     Parkinson's disease (HCC)       History of present illness:  Bladimir Mitchell 66 y.o. male with Parkinson's disease.    After I increased his carbidopa/levodopa to 1-1/2 tablets 3 times daily his mother reported that he was acting confused and forgot to pick her up after a medical appointment.  I lowered his dosage back to the original 1 pill 3 times daily dosage, and there was improvement, however he continues to have strange episodes where he is forgetful.  He was unable to breath, claims to have anxiety and knocked on his mother's door. He felt that he was underwater. These occurred at 1pm and 8pm.      PD Meds:   Carb/levo 25/100 1 tab 3 times daily. He has to be reminded to take his levodopa. He takes a dose at 8am, 2-3pm, and prior to bedtime.     Movement-Specific ROS  Anosmia: No   Sleep: \"Decent\". Sleeps at 7-8 and wakes up at 3-4   Speech: No changes per patient with speech   Swallowing: No problems   Constipation: Yes - he has a BM every other day    Urination: No   Dizziness: Not frequently  Hallucinations: Yes - he sees black stuff on the wall or on the ceiling at nighttime    Cognition: He is forgetful.   Excessive daytime somnolence: Sleeps during the day if he is at home. When he is at home he watches TV.   Exercise: No  Anxiety: Yes, due to work related issues.    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   • RA (rheumatoid arthritis) (HCC)    • Sleep apnea 3/27/2017       Past surgical history:   Past Surgical History:   Procedure Laterality Date   • ABDOMINAL EXPLORATION     • " CHOLECYSTECTOMY         Family history:   Family History   Problem Relation Age of Onset   • No Known Problems Mother    • No Known Problems Father    • No Known Problems Maternal Grandmother    • No Known Problems Maternal Grandfather    • No Known Problems Paternal Grandmother    • No Known Problems Paternal Grandfather        Social history:   Social History     Socioeconomic History   • Marital status: Single     Spouse name: Not on file   • Number of children: Not on file   • Years of education: Not on file   • Highest education level: Not on file   Occupational History   • Not on file   Tobacco Use   • Smoking status: Former Smoker     Packs/day: 2.00     Years: 25.00     Pack years: 50.00     Types: Cigarettes     Quit date:      Years since quittin.2   • Smokeless tobacco: Never Used   Vaping Use   • Vaping Use: Never used   Substance and Sexual Activity   • Alcohol use: Not Currently     Alcohol/week: 0.6 - 1.2 oz     Types: 1 - 2 Shots of liquor per week     Comment: min   • Drug use: No   • Sexual activity: Yes   Other Topics Concern   • Not on file   Social History Narrative   • Not on file     Social Determinants of Health     Financial Resource Strain: Not on file   Food Insecurity: Not on file   Transportation Needs: Not on file   Physical Activity: Not on file   Stress: Not on file   Social Connections: Not on file   Intimate Partner Violence: Not on file   Housing Stability: Not on file       Current medications:   Current Outpatient Medications   Medication   • ROPINIRole (REQUIP) 0.25 MG Tab   • [START ON 2022] ROPINIRole (REQUIP) 1 MG Tab   • Lidocaine 4 % Patch   • amLODIPine (NORVASC) 5 MG Tab   • carbidopa-levodopa (SINEMET)  MG Tab   • atorvastatin (LIPITOR) 40 MG Tab   • losartan (COZAAR) 100 MG Tab   • docusate sodium (COLACE) 100 MG Cap     No current facility-administered medications for this visit.       Medication Allergy:  No Known Allergies    Physical examination:  "  Vitals:    04/15/22 0821   BP: 130/78   BP Location: Left arm   Patient Position: Sitting   BP Cuff Size: Adult   Pulse: 71   Resp: 16   Temp: 36.9 °C (98.4 °F)   TempSrc: Temporal   SpO2: 98%   Weight: 88.6 kg (195 lb 5.2 oz)   Height: 1.854 m (6' 1\")     Neurological Exam  Mental Status   Oriented only to person, place and time. Recalls 3 of 3 objects immediately. Clock drawing is abnormal. Hours are placed outside of the clock contour     Small clock contour . Unable to count back past 97. . Able to spell words backwards. DLROW . Digit span was 5 forward and 3 backward.    Cranial Nerves  CN III, IV, VI: Extraocular movements intact bilaterally. No nystagmus. Normal smooth pursuit.    Motor   Increased muscle tone. The following abnormal movements were seen: +Intermittent right arm rest tremor.    Moderately diminished finger tapping amplitude, pronation/supination, and foot tapping bilaterally.     .    Gait    Slow, bradykinetic gait .       Labs:  I reviewed the following labs personally:  None    Imaging:   None     ASSESSMENT AND PLAN:  Problem List Items Addressed This Visit     Parkinson's disease (HCC)    Relevant Medications    ROPINIRole (REQUIP) 0.25 MG Tab    ROPINIRole (REQUIP) 1 MG Tab (Start on 5/12/2022)          1. Parkinson's disease (HCC)  - ROPINIRole (REQUIP) 0.25 MG Tab; Take 1 Tablet by mouth 3 times a day for 7 days, THEN 2 Tablets 3 times a day for 7 days, THEN 3 Tablets 3 times a day for 7 days.  Dispense: 126 Tablet; Refill: 0  - ROPINIRole (REQUIP) 1 MG Tab; Take 1 Tablet by mouth 3 times a day. Start after completion of ropinirole 0.25mg dose titration  Dispense: 90 Tablet; Refill: 2    66-year-old male with Parkinson's disease, currently undertreated.  As last visit, I wanted to increase his carbidopa levodopa to 1-1/2 tablets and then to 2 tablets 3 times daily, however his mother reports that this caused worsened confusion.  Currently he is bradykinetic on 1 pill 3 times daily.  " I have started him on ropinirole given the possible confusion from a higher dose of L-dopa.  His anxiety attacks might be secondary to wearing off/low dopamine in the brain.    FOLLOW-UP:   Return in about 3 months (around 7/15/2022).    Total time spent for the day for this patient unrelated to procedure time is: 40 minutes. I spent 31 minutes in face to face time and I spent 3 minutes pre-charting and 6 minutes in post-visit documentation.      Dr. Kuldeep Phipps D.O.  Former[facility]

## 2022-04-15 NOTE — PATIENT INSTRUCTIONS
No changes to carb/levo. Take your carb/levo on time every 6 hours after waking     Week 1  Ropinirole 0.25mg 1 tab 3 times daily     Week 2   Ropinirole 0.25 2 tabs 3 times daily     Week 3   Ropinirole 0.25 3 tabs 3 times daily     Week 4   Ropinirole 1mg 3 times daily     Update me after completion of this dose increase to determine if further changes are necessary.

## 2022-04-27 ENCOUNTER — HOSPITAL ENCOUNTER (OUTPATIENT)
Dept: LAB | Facility: MEDICAL CENTER | Age: 67
End: 2022-04-27
Attending: STUDENT IN AN ORGANIZED HEALTH CARE EDUCATION/TRAINING PROGRAM
Payer: MEDICARE

## 2022-04-27 DIAGNOSIS — R73.01 IMPAIRED FASTING GLUCOSE: ICD-10-CM

## 2022-04-27 DIAGNOSIS — E78.5 DYSLIPIDEMIA: ICD-10-CM

## 2022-04-27 DIAGNOSIS — I10 ESSENTIAL HYPERTENSION: ICD-10-CM

## 2022-04-27 LAB
ALBUMIN SERPL BCP-MCNC: 4.5 G/DL (ref 3.2–4.9)
ALBUMIN/GLOB SERPL: 1.5 G/DL
ALP SERPL-CCNC: 66 U/L (ref 30–99)
ALT SERPL-CCNC: 21 U/L (ref 2–50)
ANION GAP SERPL CALC-SCNC: 16 MMOL/L (ref 7–16)
AST SERPL-CCNC: 20 U/L (ref 12–45)
BASOPHILS # BLD AUTO: 0.7 % (ref 0–1.8)
BASOPHILS # BLD: 0.04 K/UL (ref 0–0.12)
BILIRUB SERPL-MCNC: 2.5 MG/DL (ref 0.1–1.5)
BUN SERPL-MCNC: 12 MG/DL (ref 8–22)
CALCIUM SERPL-MCNC: 9.6 MG/DL (ref 8.5–10.5)
CHLORIDE SERPL-SCNC: 103 MMOL/L (ref 96–112)
CHOLEST SERPL-MCNC: 131 MG/DL (ref 100–199)
CO2 SERPL-SCNC: 21 MMOL/L (ref 20–33)
CREAT SERPL-MCNC: 0.76 MG/DL (ref 0.5–1.4)
EOSINOPHIL # BLD AUTO: 0.07 K/UL (ref 0–0.51)
EOSINOPHIL NFR BLD: 1.2 % (ref 0–6.9)
ERYTHROCYTE [DISTWIDTH] IN BLOOD BY AUTOMATED COUNT: 43.1 FL (ref 35.9–50)
EST. AVERAGE GLUCOSE BLD GHB EST-MCNC: 100 MG/DL
FASTING STATUS PATIENT QL REPORTED: NORMAL
GFR SERPLBLD CREATININE-BSD FMLA CKD-EPI: 99 ML/MIN/1.73 M 2
GLOBULIN SER CALC-MCNC: 3 G/DL (ref 1.9–3.5)
GLUCOSE SERPL-MCNC: 75 MG/DL (ref 65–99)
HBA1C MFR BLD: 5.1 % (ref 4–5.6)
HCT VFR BLD AUTO: 44.5 % (ref 42–52)
HDLC SERPL-MCNC: 49 MG/DL
HGB BLD-MCNC: 15.6 G/DL (ref 14–18)
IMM GRANULOCYTES # BLD AUTO: 0.01 K/UL (ref 0–0.11)
IMM GRANULOCYTES NFR BLD AUTO: 0.2 % (ref 0–0.9)
LDLC SERPL CALC-MCNC: 66 MG/DL
LYMPHOCYTES # BLD AUTO: 1.28 K/UL (ref 1–4.8)
LYMPHOCYTES NFR BLD: 22 % (ref 22–41)
MCH RBC QN AUTO: 31.5 PG (ref 27–33)
MCHC RBC AUTO-ENTMCNC: 35.1 G/DL (ref 33.7–35.3)
MCV RBC AUTO: 89.9 FL (ref 81.4–97.8)
MONOCYTES # BLD AUTO: 0.51 K/UL (ref 0–0.85)
MONOCYTES NFR BLD AUTO: 8.8 % (ref 0–13.4)
NEUTROPHILS # BLD AUTO: 3.9 K/UL (ref 1.82–7.42)
NEUTROPHILS NFR BLD: 67.1 % (ref 44–72)
NRBC # BLD AUTO: 0 K/UL
NRBC BLD-RTO: 0 /100 WBC
PLATELET # BLD AUTO: 295 K/UL (ref 164–446)
PMV BLD AUTO: 9.3 FL (ref 9–12.9)
POTASSIUM SERPL-SCNC: 2.8 MMOL/L (ref 3.6–5.5)
PROT SERPL-MCNC: 7.5 G/DL (ref 6–8.2)
RBC # BLD AUTO: 4.95 M/UL (ref 4.7–6.1)
SODIUM SERPL-SCNC: 140 MMOL/L (ref 135–145)
TRIGL SERPL-MCNC: 81 MG/DL (ref 0–149)
TSH SERPL DL<=0.005 MIU/L-ACNC: 1.62 UIU/ML (ref 0.38–5.33)
WBC # BLD AUTO: 5.8 K/UL (ref 4.8–10.8)

## 2022-04-27 PROCEDURE — 80061 LIPID PANEL: CPT

## 2022-04-27 PROCEDURE — 36415 COLL VENOUS BLD VENIPUNCTURE: CPT

## 2022-04-27 PROCEDURE — 84443 ASSAY THYROID STIM HORMONE: CPT

## 2022-04-27 PROCEDURE — 85025 COMPLETE CBC W/AUTO DIFF WBC: CPT

## 2022-04-27 PROCEDURE — 83036 HEMOGLOBIN GLYCOSYLATED A1C: CPT

## 2022-04-27 PROCEDURE — 80053 COMPREHEN METABOLIC PANEL: CPT

## 2022-05-03 DIAGNOSIS — G20.A1 PARKINSON'S DISEASE (HCC): ICD-10-CM

## 2022-05-03 RX ORDER — ROPINIROLE 0.25 MG/1
TABLET, FILM COATED ORAL
Qty: 126 TABLET | Refills: 0 | Status: SHIPPED | OUTPATIENT
Start: 2022-05-03 | End: 2022-05-04 | Stop reason: SINTOL

## 2022-05-04 ENCOUNTER — OFFICE VISIT (OUTPATIENT)
Dept: NEUROLOGY | Facility: MEDICAL CENTER | Age: 67
End: 2022-05-04
Attending: PSYCHIATRY & NEUROLOGY
Payer: MEDICARE

## 2022-05-04 VITALS
SYSTOLIC BLOOD PRESSURE: 128 MMHG | BODY MASS INDEX: 26.76 KG/M2 | OXYGEN SATURATION: 95 % | HEIGHT: 71 IN | HEART RATE: 80 BPM | DIASTOLIC BLOOD PRESSURE: 82 MMHG | WEIGHT: 191.14 LBS

## 2022-05-04 DIAGNOSIS — Z79.899 HIGH RISK MEDICATION USE: ICD-10-CM

## 2022-05-04 DIAGNOSIS — G20.A1 PARKINSON'S DISEASE (HCC): ICD-10-CM

## 2022-05-04 PROCEDURE — 99214 OFFICE O/P EST MOD 30 MIN: CPT | Performed by: PSYCHIATRY & NEUROLOGY

## 2022-05-04 RX ORDER — CARBIDOPA AND LEVODOPA 25; 100 MG/1; MG/1
1 TABLET, EXTENDED RELEASE ORAL
Qty: 60 TABLET | Refills: 2 | Status: SHIPPED | OUTPATIENT
Start: 2022-05-04 | End: 2022-05-26

## 2022-05-04 ASSESSMENT — FIBROSIS 4 INDEX: FIB4 SCORE: 0.96

## 2022-05-04 NOTE — PATIENT INSTRUCTIONS
I have started carbidopa/levodopa extended release 1 pill twice daily.     If you are tolerating this dosage well, after 2 weeks, increase the dose to 3 times daily.

## 2022-05-04 NOTE — RESULT ENCOUNTER NOTE
Dear Bladimir, I reviewed your lab tests your blood sugar number and blood counts were normal, your cholesterol was also normal.  Your potassium was slightly low and one of your liver numbers was slightly high, this can both be from taking the Sinemet.  I would encourage you to eat slightly more potassium rich foods, see this website for resources([email or web address]).  I will order a repeat blood test to get before your next 3-month follow-up visit so we can keep an eye on these numbers. Sincerely, Dr. Chou

## 2022-05-04 NOTE — PROGRESS NOTES
"Chief Complaint   Patient presents with   • Follow-Up     PARKINSONS       History of present illness:  Bladimir Mitchell 66 y.o. male with Parkinson's disease.    After I increased his carbidopa/levodopa to 1-1/2 tablets 3 times daily his mother reported that he was acting confused and forgot to pick her up after a medical appointment.  I lowered his dosage back to the original 1 pill 3 times daily dosage, and there was improvement, however he continues to have strange episodes where he is forgetful.  He was unable to breath, claims to have anxiety and knocked on his mother's door. He felt that he was underwater. These occurred at 1pm and 8pm.      PD Meds:   Carb/levo 25/100 1 tab 3 times daily. He has to be reminded to take his levodopa. He takes a dose at 8am, 2-3pm, and prior to bedtime.      Movement-Specific ROS  Anosmia: No   Sleep: \"Decent\". Sleeps at 7-8 and wakes up at 3-4   Speech: No changes per patient with speech   Swallowing: No problems   Constipation: Yes - he has a BM every other day    Urination: No   Dizziness: Not frequently  Hallucinations: Yes - he sees black stuff on the wall or on the ceiling at nighttime    Cognition: He is forgetful.   Excessive daytime somnolence: Sleeps during the day if he is at home. When he is at home he watches TV.   Exercise: No  Anxiety: Yes, due to work related issues.    Balance: Has had 1-2 falls, none recently    5/4/22: He has been having hallucinations since being on ropinirole, thinking that he had to go into work on Saturday. After starting his 3rd week, he developed hallucinations and did not continue the 3rd week. He stopped all of his PD medications after talking with his pharmacist. He has not had trouble breathing since stopping all his PD medications.     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   • RA (rheumatoid arthritis) (HCC)    • Sleep apnea 3/27/2017       Past surgical history:   Past Surgical History:   Procedure Laterality Date   • ABDOMINAL EXPLORATION     • CHOLECYSTECTOMY         Family history:   Family History   Problem Relation Age of Onset   • No Known Problems Mother    • No Known Problems Father    • No Known Problems Maternal Grandmother    • No Known Problems Maternal Grandfather    • No Known Problems Paternal Grandmother    • No Known Problems Paternal Grandfather        Social history:   Social History     Socioeconomic History   • Marital status: Single     Spouse name: Not on file   • Number of children: Not on file   • Years of education: Not on file   • Highest education level: Not on file   Occupational History   • Not on file   Tobacco Use   • Smoking status: Former Smoker     Packs/day: 2.00     Years: 25.00     Pack years: 50.00     Types: Cigarettes     Quit date:      Years since quittin.3   • Smokeless tobacco: Never Used   Vaping Use   • Vaping Use: Never used   Substance and Sexual Activity   • Alcohol use: Not Currently     Alcohol/week: 0.6 - 1.2 oz     Types: 1 - 2 Shots of liquor per week     Comment: min   • Drug use: No   • Sexual activity: Yes   Other Topics Concern   • Not on file   Social History Narrative   • Not on file     Social Determinants of Health     Financial Resource Strain: Not on file   Food Insecurity: Not on file   Transportation Needs: Not on file   Physical Activity: Not on file   Stress: Not on file   Social Connections: Not on file   Intimate Partner Violence: Not on file   Housing Stability: Not on file       Current medications:   Current Outpatient Medications   Medication   • carbidopa-levodopa CR (SINEMET CR)  MG per tablet   • Lidocaine 4 % Patch   •  "amLODIPine (NORVASC) 5 MG Tab   • docusate sodium (COLACE) 100 MG Cap   • atorvastatin (LIPITOR) 40 MG Tab   • losartan (COZAAR) 100 MG Tab     No current facility-administered medications for this visit.       Medication Allergy:  No Known Allergies    Physical examination:   Vitals:    05/04/22 0713   BP: 128/82   BP Location: Right arm   Patient Position: Sitting   BP Cuff Size: Adult   Pulse: 80   SpO2: 95%   Weight: 86.7 kg (191 lb 2.2 oz)   Height: 1.803 m (5' 11\")     Neurological Exam  Mental Status  Awake and alert. Speech is normal. Language is fluent with no aphasia.    Motor   The following abnormal movements were seen: Right arm and leg rest tremor was constantly present..        Gait  Casual gait:  Moderate bradykinesia of gait..       Labs:  I reviewed the following labs personally:  None    Imaging:   None     ASSESSMENT AND PLAN:  Problem List Items Addressed This Visit     Parkinson's disease (HCC)    Relevant Medications    carbidopa-levodopa CR (SINEMET CR)  MG per tablet          1. Parkinson's disease (HCC)  - carbidopa-levodopa CR (SINEMET CR)  MG per tablet; Take 1 Tablet by mouth 2 (two) times a day.  Dispense: 60 Tablet; Refill: 2    After starting a low-dose of ropinirole in addition to the immediate release carbidopa levodopa, the patient developed severe episodes of shortness of breath.  There is a problem previously as well just on 1 tablet of carb levo 3 times daily, but worsened markedly after being on 0.75 mg of ropinirole 3 times daily.  This is an unusual issue, however sounds like it is related to the dopaminergic therapy.  Given that currently he is untreated and is having bothersome parkinsonism, he needs to be on an anti-Parkinson's drug.  I have started him on controlled release carbidopa levodopa, hoping that this is going to be associated with less of this shortness of breath.  If the shortness of breath persists, I have instructed patient to make an appointment " his primary doctor to seek an evaluation for this.     FOLLOW-UP:   Return if symptoms worsen or fail to improve.    Total time spent for the day for this patient unrelated to procedure time is: 32 minutes. I spent 27 minutes in face to face time and I spent 1 minutes pre-charting and 4 minutes in post-visit documentation.      Dr. Kuldeep Phipps D.O.  [facility]

## 2022-05-30 ENCOUNTER — PATIENT MESSAGE (OUTPATIENT)
Dept: NEUROLOGY | Facility: MEDICAL CENTER | Age: 67
End: 2022-05-30
Payer: MEDICARE

## 2022-05-30 DIAGNOSIS — R46.89 SPELL OF ABNORMAL BEHAVIOR: ICD-10-CM

## 2022-06-13 ENCOUNTER — NON-PROVIDER VISIT (OUTPATIENT)
Dept: NEUROLOGY | Facility: MEDICAL CENTER | Age: 67
End: 2022-06-13
Attending: STUDENT IN AN ORGANIZED HEALTH CARE EDUCATION/TRAINING PROGRAM
Payer: MEDICARE

## 2022-06-13 DIAGNOSIS — R46.89 SPELL OF ABNORMAL BEHAVIOR: ICD-10-CM

## 2022-06-13 PROCEDURE — 95708 EEG WO VID EA 12-26HR UNMNTR: CPT | Performed by: STUDENT IN AN ORGANIZED HEALTH CARE EDUCATION/TRAINING PROGRAM

## 2022-06-13 PROCEDURE — 95700 EEG CONT REC W/VID EEG TECH: CPT | Performed by: STUDENT IN AN ORGANIZED HEALTH CARE EDUCATION/TRAINING PROGRAM

## 2022-06-13 PROCEDURE — 95719 EEG PHYS/QHP EA INCR W/O VID: CPT | Performed by: STUDENT IN AN ORGANIZED HEALTH CARE EDUCATION/TRAINING PROGRAM

## 2022-06-13 NOTE — PROCEDURES
AMBULATORY ELECTROENCEPHALOGRAM REPORT      Referring provider:   Nasir Phipps D.O.  [address] [place] [address]      DOS: 06/13/2022       Duration of Recording: (23 hours and 27 minutes)      INDICATION:  Bladimir Mitchell 66 y.o. male presenting with  altered mental status and paroxysmal neurological events (Ambulatory EEG to capture shaking spells with shortness of breath and confusion)    CURRENT OUTPATIENT MEDICATION LIST:   Current Outpatient Medications   Medication Instructions   • amLODIPine (NORVASC) 5 mg, Oral, EVERY DAY   • atorvastatin (LIPITOR) 40 mg, Oral, DAILY   • carbidopa-levodopa CR (SINEMET CR)  MG per tablet 1 Tablet, Oral, 2 TIMES DAILY   • docusate sodium (COLACE) 100 mg, Oral, DAILY   • Lidocaine 4 % Patch 1 Patch, Apply externally, 1 TIME DAILY PRN   • losartan (COZAAR) 100 mg, Oral, EVERY DAY         TECHNIQUE: The EEG was set up and taken down by a Neurodiagnostic technologist who performed education to patient and staff.   A minimum but not limited to 23 electrodes and 23 channel recording was setup and performed by Neurodiagnostic technologist.   Impedances, electrode integrity, and technical impressions were documented a minimum of every 2-24 hour period by a Neurodiagnostic Technologist and reviewed by Interpreting Physician.    DESCRIPTION OF THE RECORD:  During maximal wakefulness, the background was continuous and showed a 8-9 Hz posterior dominant rhythm, with a mixture of alpha, theta, and delta, at times rhythmic and/or periodic at 1-1.5 Hz and triphasic in morphology.  Reactivity and state changes were present.  During drowsiness, theta/delta frequencies were seen.    Sleep was captured and was characterized by diffuse background delta/theta activity with a loss of myogenic artifact.  N2 sleep transients in the form of sleep spindles and vertex waves were seen in the leads over the central regions.     ACTIVATION PROCEDURES:   NA    ICTAL AND  INTERICTAL FINDINGS:   No definitive epileptiform discharges.    Frequent left more than right bitemporal theta/delta slowing, at times synchronous as well as independently slow over the temporal lobes.  At times, the slowing over the left appears quasirhythmic at 1 to 1.5 Hz.  This focal rhythmic slowing is suspicious but not a definite epileptiform phenomena.    No definite seizures.    EKG: Sampling of the EKG recording showed intermittent PACs and/or PVCs    EVENTS:  Push button events and/or ambulatory diary events:    d1 07:16:43 PM  4-5 Hz movement artifact seen on EKG lead  d1 07:20:10 PM  PATIENT EVENT - LEFT HAND SHAKING WHILE HOLDING EEG RECORDING BOX AND WHILE SLEEPING IN CHAIR    Other clinical events: Occasional 4 to 6 Hz activity over various electrode derivations in the right posterior leads, left posterior leads, and/or EKG leads.  These likely represent rhythmic movement artifact and not electrophysiological activity of the brain.    NOTE: This is a technically-limited study due to abundant myogenic, movement, and lead artifact obscuring most of the recording.      INTERPRETATION:  This is an abnormal although technically-limited ambulatory EEG recording in the awake and drowsy/sleep state(s):  -Mild to moderate diffuse background slowing, at times with triphasic waves, is suggestive of diffuse/multifocal cerebral dysfunction and consistent with a non-specific encephalopathy. Clinical correlation recommended.  -Frequent left more than right bitemporal theta/delta slowing.  At times, the slowing over the left appears quasi-rhythmic at 1 to 1.5 Hz.  This focal quasi-rhythmic slowing over the left is suspicious but not a definite epileptiform phenomena.  The bitemporal slowing is most suggestive of nonspecific dysfunction and/or structural abnormalities in these regions.  Clinical and radiographic correlation recommended.  -No definitive epileptiform discharges.No definitive seizures. Clinical  correlation is recommended.  -Clinical Events: 1 event reported in ambulatory diary of left hand shaking while patient was sleeping in his chair. There was no abonrmal EEG correlate. There was a 4-5 Hz rhythmic artifact appreciate best on the EKG lead, likely representing movement artifact of the limb. In addition, there were other clinical events not reported but suggested by the presence of rhythmic artifact. Specifically, there were occasional runs of 4 to 6 Hz activity over various electrode derivations in the right posterior leads, left posterior leads, and/or EKG leads.  These likely represent rhythmic movement artifact and not electrophysiological activity of the brain. Clinical correlation is recommended.  -If there is ongoing concern for seizures, consider inpatient video EEG monitoring given the presence of abundant artifact seen in this ambulatory recording, which limits interpretation.  In addition, inpatient video EEG monitoring is a more reliable means to rule in or rule out epileptic phenomena.           Delfin Jaeger MD  Epilepsy and General Neurology  Department of Neurology  Clinical  of Neurology [facility] [[email or web address]] of Medicine.

## 2022-06-14 ENCOUNTER — NON-PROVIDER VISIT (OUTPATIENT)
Dept: NEUROLOGY | Facility: MEDICAL CENTER | Age: 67
End: 2022-06-14
Attending: PSYCHIATRY & NEUROLOGY
Payer: MEDICARE

## 2022-06-14 DIAGNOSIS — R46.89 SPELL OF ABNORMAL BEHAVIOR: ICD-10-CM

## 2022-06-14 DIAGNOSIS — G20.A1 PARKINSON'S DISEASE (HCC): ICD-10-CM

## 2022-06-14 PROCEDURE — 95708 EEG WO VID EA 12-26HR UNMNTR: CPT | Performed by: STUDENT IN AN ORGANIZED HEALTH CARE EDUCATION/TRAINING PROGRAM

## 2022-06-14 PROCEDURE — 95719 EEG PHYS/QHP EA INCR W/O VID: CPT | Performed by: STUDENT IN AN ORGANIZED HEALTH CARE EDUCATION/TRAINING PROGRAM

## 2022-06-14 NOTE — PROCEDURES
AMBULATORY ELECTROENCEPHALOGRAM REPORT      Referring provider:   Nasir Phipps D.O.  [address] [place] [address]      DOS: 06/14/2022       Duration of Recording: (23 hours and 23 minutes)      INDICATION:  Bladimir Mitchell 66 y.o. male presenting with  altered mental status and paroxysmal neurological events (Ambulatory EEG to capture shaking spells with shortness of breath and confusion)    CURRENT OUTPATIENT MEDICATION LIST:   Current Outpatient Medications   Medication Instructions   • amLODIPine (NORVASC) 5 mg, Oral, EVERY DAY   • atorvastatin (LIPITOR) 40 mg, Oral, DAILY   • carbidopa-levodopa CR (SINEMET CR)  MG per tablet 1 Tablet, Oral, 2 TIMES DAILY   • docusate sodium (COLACE) 100 mg, Oral, DAILY   • Lidocaine 4 % Patch 1 Patch, Apply externally, 1 TIME DAILY PRN   • losartan (COZAAR) 100 mg, Oral, EVERY DAY         TECHNIQUE: The EEG was set up and taken down by a Neurodiagnostic technologist who performed education to patient and staff.   A minimum but not limited to 23 electrodes and 23 channel recording was setup and performed by Neurodiagnostic technologist.   Impedances, electrode integrity, and technical impressions were documented a minimum of every 2-24 hour period by a Neurodiagnostic Technologist and reviewed by Interpreting Physician.    DESCRIPTION OF THE RECORD:  During maximal wakefulness, the background was continuous and showed a 8-9 Hz posterior dominant rhythm, with a mixture of alpha, theta, and delta, at times rhythmic and/or periodic at 1-1.5 Hz and triphasic in morphology.  Reactivity and state changes were present.  During drowsiness, theta/delta frequencies were seen.    Sleep was captured and was characterized by diffuse background delta/theta activity with a loss of myogenic artifact.  N2 sleep transients in the form of sleep spindles and vertex waves were seen in the leads over the central regions.     ACTIVATION PROCEDURES:   Hyperventilation  "was performed by the patient for a total of 3 minutes. The technician performing the test noted good effort. This technique did not elicited any abnormalities on EEG. , Intermittent Photic stimulation was performed in a stepwise fashion from 1 to 30 Hz and did not elicited any abnormalities on EEG.  and NA    ICTAL AND INTERICTAL FINDINGS:   No definitive epileptiform discharges.    Frequent left more than right bitemporal theta/delta slowing, at times synchronous as well as independently slow over the temporal lobes.  At times, the slowing over the left appears quasirhythmic at 1 to 1.5 Hz.  This focal rhythmic slowing is suspicious but not a definite epileptiform phenomena.    There was one suspicious evolving run arising from the right>left posterior temporal regions. Rhythmic 5-6 Hz activity was seen, which appeared to subtly evolve into more sharp waveforms. There was subtle spread to the leads over the bifrontal region. While suspicious for a seizure, the ragged morphology of the waveforms and limited involvement over the midline derivations despite the bi-hemispheric involvement of this phenomena suggest the presence of rhythmic artifact, rather than electrophysiologic activity of the brain.      No definite seizures.    EKG: Sampling of the EKG recording showed intermittent PACs and/or PVCs    EVENTS:  One event reported.    Day 1  CLINICAL EVENT at 9:52 PM - \"He was in bed ssleep...He woke up screaming at me because he was hot. He was not acting right.\"      NOTE: This is a technically-limited study due to abundant myogenic, movement, and lead artifact obscuring most of the recording.      INTERPRETATION:  This is an abnormal although technically-limited ambulatory EEG recording in the awake and drowsy/sleep state(s):  -Mild to moderate diffuse background slowing, at times with triphasic waves, is suggestive of diffuse/multifocal cerebral dysfunction and consistent with a non-specific encephalopathy. " "Clinical correlation recommended.  -Frequent left more than right bitemporal slowing.  At times, the slowing over the left was quasi-rhythmic at 1 to 1.5 Hz.  This focal quasi-rhythmic slowing over the left is suspicious but not definite epileptiform phenomena.  The bitemporal slowing is most suggestive of nonspecific dysfunction and/or structural abnormalities in these regions.  Clinical and radiographic correlation recommended.  -There was one clinical event reported at 9:52 PM. The entry in the ambulatory diary reported that \"He was in bed asleep...He woke up screaming at me because he was hot. He was not acting right.\" This clinical event was not associated with an EEG correlate. As seen in yesterday's study, there were other clinical events not reported but suggested by the presence of rhythmic artifact. Specifically, there were occasional runs of 4 to 6 Hz activity over various electrode derivations in the right posterior leads, left posterior leads, and/or EKG leads.  These likely represent rhythmic movement artifact and not electrophysiological activity of the brain.   -There was one evolving rhythmic run arising from the right>left posterior temporal regions that stood out as being suspicious, even resembling a brief seizure. However, upon closer inspection, this, too, was most likely rhythmic artifact and not rhythmic activity coming from the brain. Ultimately, no definitive epileptiform discharges or other epileptiform phenomena were seen.  - If there is ongoing concern for seizures, consider inpatient video EEG monitoring using EMG over the limbs which would provide a much more reliable means to rule in or rule out epileptic phenomena.                 Delfin Jaeger MD  Epilepsy and General Neurology  Department of Neurology  Clinical  of Neurology [facility].         "

## 2022-06-15 ENCOUNTER — NON-PROVIDER VISIT (OUTPATIENT)
Dept: NEUROLOGY | Facility: MEDICAL CENTER | Age: 67
End: 2022-06-15
Attending: STUDENT IN AN ORGANIZED HEALTH CARE EDUCATION/TRAINING PROGRAM
Payer: MEDICARE

## 2022-06-15 PROCEDURE — 99999 PR NO CHARGE: CPT | Performed by: STUDENT IN AN ORGANIZED HEALTH CARE EDUCATION/TRAINING PROGRAM

## 2022-06-23 ENCOUNTER — APPOINTMENT (OUTPATIENT)
Dept: NEUROLOGY | Facility: MEDICAL CENTER | Age: 67
End: 2022-06-23
Attending: PSYCHIATRY & NEUROLOGY
Payer: MEDICARE

## 2022-06-23 NOTE — PROGRESS NOTES
"No chief complaint on file.      History of present illness:  Bladimir Mitchell 66 y.o. male with Parkinson's disease.    After I increased his carbidopa/levodopa to 1-1/2 tablets 3 times daily his mother reported that he was acting confused and forgot to pick her up after a medical appointment.  I lowered his dosage back to the original 1 pill 3 times daily dosage, and there was improvement, however he continues to have strange episodes where he is forgetful.  He was unable to breath, claims to have anxiety and knocked on his mother's door. He felt that he was underwater. These occurred at 1pm and 8pm.       PD Meds:   Carb/levo 25/100 1 tab 3 times daily. He has to be reminded to take his levodopa. He takes a dose at 8am, 2-3pm, and prior to bedtime.      Movement-Specific ROS  Anosmia: No   Sleep: \"Decent\". Sleeps at 7-8 and wakes up at 3-4   Speech: No changes per patient with speech   Swallowing: No problems   Constipation: Yes - he has a BM every other day    Urination: No   Dizziness: Not frequently  Hallucinations: Yes - he sees black stuff on the wall or on the ceiling at nighttime    Cognition: He is forgetful.   Excessive daytime somnolence: Sleeps during the day if he is at home. When he is at home he watches TV.   Exercise: No  Anxiety: Yes, due to work related issues.    Balance: Has had 1-2 falls, none recently     5/4/22: He has been having hallucinations since being on ropinirole, thinking that he had to go into work on Saturday. After starting his 3rd week, he developed hallucinations and did not continue the 3rd week. He stopped all of his PD medications after talking with his pharmacist. He has not had trouble breathing since stopping all his PD medications.      6/23/22:   The patient's mother has been extremely concerned about him recently.  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   • RA (rheumatoid arthritis) (HCC)    • Sleep apnea 3/27/2017       Past surgical history:   Past Surgical History:   Procedure Laterality Date   • ABDOMINAL EXPLORATION     • CHOLECYSTECTOMY         Family history:   Family History   Problem Relation Age of Onset   • No Known Problems Mother    • No Known Problems Father    • No Known Problems Maternal Grandmother    • No Known Problems Maternal Grandfather    • No Known Problems Paternal Grandmother    • No Known Problems Paternal Grandfather        Social history:   Social History     Socioeconomic History   • Marital status: Single     Spouse name: Not on file   • Number of children: Not on file   • Years of education: Not on file   • Highest education level: Not on file   Occupational History   • Not on file   Tobacco Use   • Smoking status: Former Smoker     Packs/day: 2.00     Years: 25.00     Pack years: 50.00     Types: Cigarettes     Quit date:      Years since quittin.4   • Smokeless tobacco: Never Used   Vaping Use   • Vaping Use: Never used   Substance and Sexual Activity   • Alcohol use: Not Currently     Alcohol/week: 0.6 - 1.2 oz     Types: 1 - 2 Shots of liquor per week     Comment: min   • Drug use: No   • Sexual activity: Yes   Other Topics Concern   • Not on file   Social History Narrative   • Not on file     Social Determinants of Health     Financial Resource Strain: Not on file   Food Insecurity: Not on file   Transportation Needs: Not on file   Physical Activity: Not on file   Stress: Not on file   Social Connections: Not on file   Intimate Partner Violence: Not on file   Housing Stability: Not on file       Current medications:   Current Outpatient Medications  "  Medication   • carbidopa-levodopa CR (SINEMET CR)  MG per tablet   • Lidocaine 4 % Patch   • amLODIPine (NORVASC) 5 MG Tab   • losartan (COZAAR) 100 MG Tab   • docusate sodium (COLACE) 100 MG Cap   • atorvastatin (LIPITOR) 40 MG Tab     No current facility-administered medications for this visit.       Medication Allergy:  No Known Allergies    Physical examination:   There were no vitals filed for this visit.  Neurological Exam     Labs:  I reviewed the following labs personally:  ***    Imaging:   Ambulatory EEG:  INTERPRETATION:  This is an abnormal although technically-limited ambulatory EEG recording in the awake and drowsy/sleep state(s):  -Mild to moderate diffuse background slowing, at times with triphasic waves, is suggestive of diffuse/multifocal cerebral dysfunction and consistent with a non-specific encephalopathy. Clinical correlation recommended.  -Frequent left more than right bitemporal slowing.  At times, the slowing over the left was quasi-rhythmic at 1 to 1.5 Hz.  This focal quasi-rhythmic slowing over the left is suspicious but not definite epileptiform phenomena.  The bitemporal slowing is most suggestive of nonspecific dysfunction and/or structural abnormalities in these regions.  Clinical and radiographic correlation recommended.  -There was one clinical event reported at 9:52 PM. The entry in the ambulatory diary reported that \"He was in bed asleep...He woke up screaming at me because he was hot. He was not acting right.\" This clinical event was not associated with an EEG correlate. As seen in yesterday's study, there were other clinical events not reported but suggested by the presence of rhythmic artifact. Specifically, there were occasional runs of 4 to 6 Hz activity over various electrode derivations in the right posterior leads, left posterior leads, and/or EKG leads.  These likely represent rhythmic movement artifact and not electrophysiological activity of the brain.   -There " was one evolving rhythmic run arising from the right>left posterior temporal regions that stood out as being suspicious, even resembling a brief seizure. However, upon closer inspection, this, too, was most likely rhythmic artifact and not rhythmic activity coming from the brain. Ultimately, no definitive epileptiform discharges or other epileptiform phenomena were seen.  - If there is ongoing concern for seizures, consider inpatient video EEG monitoring using EMG over the limbs which would provide a much more reliable means to rule in or rule out epileptic phenomena.     ASSESSMENT AND PLAN:  Problem List Items Addressed This Visit    None         There are no diagnoses linked to this encounter.    FOLLOW-UP:   No follow-ups on file.    Total time spent for the day for this patient unrelated to procedure time is: *** minutes. I spent *** minutes in face to face time and I spent *** minutes pre-charting and *** minutes in post-visit documentation.      Dr. Kuldeep Phipps D.O.  [facility]

## 2022-06-24 ENCOUNTER — OFFICE VISIT (OUTPATIENT)
Dept: NEUROLOGY | Facility: MEDICAL CENTER | Age: 67
End: 2022-06-24
Attending: PSYCHIATRY & NEUROLOGY
Payer: MEDICARE

## 2022-06-24 VITALS
HEIGHT: 73 IN | TEMPERATURE: 98.9 F | OXYGEN SATURATION: 95 % | DIASTOLIC BLOOD PRESSURE: 68 MMHG | WEIGHT: 190.92 LBS | BODY MASS INDEX: 25.3 KG/M2 | SYSTOLIC BLOOD PRESSURE: 108 MMHG | HEART RATE: 79 BPM

## 2022-06-24 DIAGNOSIS — G20.A1 PARKINSON'S DISEASE (HCC): ICD-10-CM

## 2022-06-24 DIAGNOSIS — R55 SYNCOPE AND COLLAPSE: ICD-10-CM

## 2022-06-24 PROCEDURE — 99211 OFF/OP EST MAY X REQ PHY/QHP: CPT | Performed by: PSYCHIATRY & NEUROLOGY

## 2022-06-24 PROCEDURE — 99215 OFFICE O/P EST HI 40 MIN: CPT | Performed by: PSYCHIATRY & NEUROLOGY

## 2022-06-24 ASSESSMENT — FIBROSIS 4 INDEX: FIB4 SCORE: 0.96

## 2022-06-24 NOTE — PROGRESS NOTES
"Chief Complaint   Patient presents with   • Follow-Up     Parkinson's disease        History of present illness:  Bladimir Mitchell 66 y.o. male with Parkinson's disease.    After I increased his carbidopa/levodopa to 1-1/2 tablets 3 times daily his mother reported that he was acting confused and forgot to pick her up after a medical appointment.  I lowered his dosage back to the original 1 pill 3 times daily dosage, and there was improvement, however he continues to have strange episodes where he is forgetful.  He was unable to breath, claims to have anxiety and knocked on his mother's door. He felt that he was underwater. These occurred at 1pm and 8pm.       PD Meds:   Carb/levo 25/100 1 tab 3 times daily. He has to be reminded to take his levodopa. He takes a dose at 8am, 2-3pm, and prior to bedtime.      Movement-Specific ROS  Anosmia: No   Sleep: \"Decent\". Sleeps at 7-8 and wakes up at 3-4   Speech: No changes per patient with speech   Swallowing: No problems   Constipation: Yes - he has a BM every other day    Urination: No   Dizziness: Not frequently  Hallucinations: Yes - he sees black stuff on the wall or on the ceiling at nighttime    Cognition: He is forgetful.   Excessive daytime somnolence: Sleeps during the day if he is at home. When he is at home he watches TV.   Exercise: No  Anxiety: Yes, due to work related issues.    Balance: Has had 1-2 falls, none recently     5/4/22: He has been having hallucinations since being on ropinirole, thinking that he had to go into work on Saturday. After starting his 3rd week, he developed hallucinations and did not continue the 3rd week. He stopped all of his PD medications after talking with his pharmacist. He has not had trouble breathing since stopping all his PD medications.      6/24/22: He is sitting down watching TV and denies any prodrome. There is heavy breathing and he loses consciousness. He is unconscious for 20-40 minutes and does not respond to " "questions. After waking up, he asks \"how long was I out\"?   These spells are more frequent with levodopa but since he has been off of levodopa, it still happening. His last event was on .     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   • RA (rheumatoid arthritis) (HCC)    • Sleep apnea 3/27/2017       Past surgical history:   Past Surgical History:   Procedure Laterality Date   • ABDOMINAL EXPLORATION     • CHOLECYSTECTOMY         Family history:   Family History   Problem Relation Age of Onset   • No Known Problems Mother    • No Known Problems Father    • No Known Problems Maternal Grandmother    • No Known Problems Maternal Grandfather    • No Known Problems Paternal Grandmother    • No Known Problems Paternal Grandfather        Social history:   Social History     Socioeconomic History   • Marital status: Single     Spouse name: Not on file   • Number of children: Not on file   • Years of education: Not on file   • Highest education level: Not on file   Occupational History   • Not on file   Tobacco Use   • Smoking status: Former Smoker     Packs/day: 2.00     Years: 25.00     Pack years: 50.00     Types: Cigarettes     Quit date:      Years since quittin.4   • Smokeless tobacco: Never Used   Vaping Use   • Vaping Use: Never used   Substance and Sexual Activity   • Alcohol use: Not Currently     Alcohol/week: 0.6 - 1.2 oz     Types: 1 - 2 Shots of liquor per week     Comment: min   • Drug use: No   • Sexual activity: Yes   Other Topics Concern   • Not on file   Social History Narrative   • Not on file     Social Determinants of Health     Financial Resource Strain: Not on file   Food " "Insecurity: Not on file   Transportation Needs: Not on file   Physical Activity: Not on file   Stress: Not on file   Social Connections: Not on file   Intimate Partner Violence: Not on file   Housing Stability: Not on file       Current medications:   Current Outpatient Medications   Medication   • amLODIPine (NORVASC) 5 MG Tab   • atorvastatin (LIPITOR) 40 MG Tab   • carbidopa-levodopa CR (SINEMET CR)  MG per tablet   • Lidocaine 4 % Patch   • losartan (COZAAR) 100 MG Tab   • docusate sodium (COLACE) 100 MG Cap     No current facility-administered medications for this visit.       Medication Allergy:  No Known Allergies    Physical examination:   Vitals:    06/24/22 1336 06/24/22 1341   BP: 116/74 108/68   BP Location: Right arm Right arm   Patient Position: Sitting Standing   BP Cuff Size: Adult Adult   Pulse: 71 79   Temp: 37.2 °C (98.9 °F)    TempSrc: Temporal    SpO2: 95% 95%   Weight: 86.6 kg (190 lb 14.7 oz)    Height: 1.854 m (6' 1\")      Neurological Exam  Mental Status  Awake and alert. Speech is normal. Language is fluent with no aphasia.  Markedly diminished spontaneity of movement .    Motor   The following abnormal movements were seen: Constant moderate to severe right arm rest tremor .        Gait    Moderate bradykinetic gait .       Labs:  I reviewed the following labs personally:  None     Imaging:   EEG:  INTERPRETATION:  This is an abnormal although technically-limited ambulatory EEG recording in the awake and drowsy/sleep state(s):  -Mild to moderate diffuse background slowing, at times with triphasic waves, is suggestive of diffuse/multifocal cerebral dysfunction and consistent with a non-specific encephalopathy. Clinical correlation recommended.  -Frequent left more than right bitemporal slowing.  At times, the slowing over the left was quasi-rhythmic at 1 to 1.5 Hz.  This focal quasi-rhythmic slowing over the left is suspicious but not definite epileptiform phenomena.  The bitemporal " "slowing is most suggestive of nonspecific dysfunction and/or structural abnormalities in these regions.  Clinical and radiographic correlation recommended.  -There was one clinical event reported at 9:52 PM. The entry in the ambulatory diary reported that \"He was in bed asleep...He woke up screaming at me because he was hot. He was not acting right.\" This clinical event was not associated with an EEG correlate. As seen in yesterday's study, there were other clinical events not reported but suggested by the presence of rhythmic artifact. Specifically, there were occasional runs of 4 to 6 Hz activity over various electrode derivations in the right posterior leads, left posterior leads, and/or EKG leads.  These likely represent rhythmic movement artifact and not electrophysiological activity of the brain.   -There was one evolving rhythmic run arising from the right>left posterior temporal regions that stood out as being suspicious, even resembling a brief seizure. However, upon closer inspection, this, too, was most likely rhythmic artifact and not rhythmic activity coming from the brain. Ultimately, no definitive epileptiform discharges or other epileptiform phenomena were seen.  - If there is ongoing concern for seizures, consider inpatient video EEG monitoring using EMG over the limbs which would provide a much more reliable means to rule in or rule out epileptic phenomena.        ASSESSMENT AND PLAN:  Problem List Items Addressed This Visit     Parkinson's disease (HCC)      Other Visit Diagnoses     Syncope and collapse              1. Parkinson's disease (HCC)    2. Syncope and collapse    65 y/o male with Parkinson's disease with recurrent spells where he loses consciousness. He is usually not aware of this but his mother is witnessing him turn red, breath heavily and lose consciousness. The most likely explanation is syncope due to orthostatic hypotension. Carbidopa/levodopa seems to increase the likelihood of " these events, although they still have occurred since he has been off of L-DOPA entirely, but less frequently since he discontinued it. I have counseled the patient to discontinue his amlodipine and to record his sitting and standing BP's in a log. He will resume carbidopa/levodopa CR at a dose of 1 tab twice daily after he stops amlodipine. He has been counseled to return for a follow-up visit with a record of the orthostatic BP's.   If he is unable to tolerate any carbidopa/levodopa, I have counseled the patient that the only remaining treatment option would be deep brain stimulation.     FOLLOW-UP:   Return in about 2 months (around 8/24/2022) for Long 40 min f/u visit .    Total time spent for the day for this patient unrelated to procedure time is: 56 minutes. I spent 47 minutes in face to face time and I spent 2 minutes pre-charting and 7 minutes in post-visit documentation.      Dr. Kuldeep Phipps D.O.  [facility]

## 2022-06-24 NOTE — PATIENT INSTRUCTIONS
Amlodipine is a blood pressure lowering drug that you should stop. Discuss this with your PCP.     Monitor your blood pressure in the sitting and standing positions.     Once you are off of amlodipine, you may resume sinemet (carbidopa-levodopa CR (SINEMET CR)  MG)   Resume a 1 pill twice daily dose of the sinemet CR.

## 2022-06-27 ENCOUNTER — OFFICE VISIT (OUTPATIENT)
Dept: INTERNAL MEDICINE | Facility: OTHER | Age: 67
End: 2022-06-27
Payer: MEDICARE

## 2022-06-27 VITALS
HEART RATE: 77 BPM | OXYGEN SATURATION: 96 % | SYSTOLIC BLOOD PRESSURE: 124 MMHG | WEIGHT: 189 LBS | TEMPERATURE: 99.7 F | HEIGHT: 73 IN | DIASTOLIC BLOOD PRESSURE: 74 MMHG | BODY MASS INDEX: 25.05 KG/M2

## 2022-06-27 DIAGNOSIS — E87.6 HYPOKALEMIA: ICD-10-CM

## 2022-06-27 DIAGNOSIS — R55 SYNCOPE, UNSPECIFIED SYNCOPE TYPE: ICD-10-CM

## 2022-06-27 DIAGNOSIS — R55 SYNCOPE AND COLLAPSE: ICD-10-CM

## 2022-06-27 DIAGNOSIS — I71.9 AORTIC ANEURYSM WITHOUT RUPTURE, UNSPECIFIED PORTION OF AORTA (HCC): ICD-10-CM

## 2022-06-27 DIAGNOSIS — G20.A1 PARKINSON'S DISEASE (HCC): ICD-10-CM

## 2022-06-27 DIAGNOSIS — I10 ESSENTIAL HYPERTENSION: ICD-10-CM

## 2022-06-27 PROCEDURE — 99214 OFFICE O/P EST MOD 30 MIN: CPT | Mod: GC | Performed by: STUDENT IN AN ORGANIZED HEALTH CARE EDUCATION/TRAINING PROGRAM

## 2022-06-27 ASSESSMENT — FIBROSIS 4 INDEX: FIB4 SCORE: 0.96

## 2022-06-27 NOTE — PROGRESS NOTES
Established Patient    Patient Care Team:  Shaun Chou M.D. as PCP - General (Internal Medicine)  Hema Michel M.D. as Consulting Physician (Gastroenterology)    HPI:  Bladimir Mitchell is a 66 y.o. male with PMH of Parkinsons, started Sinemet in Nov. Patient accompanied by mother. Patient presents for acute visit to discuss pressures.      Sitting  this morning at 9AM. Standing  Took 5 mg amlodipine at 10AM. Patient initially said she retook BP at home at 11AM, and BP improved, but later denied taking repeat BP at home. Reports that only BP after med was in the clinic. In clinic at 1PM 138/77. Neurologist stopped amlodipine because he thought it may be contributing to passing out.      30-40 min of passing out. Deep breathing when he passes out. Also tremors. Started after taking sinemet. Patient has no recollection. ER doctor said this was anxiety attack. Patient spoke with neurology about this problem. Dr advised continuing Sinemet. Per neurologist note, he suspects syncope 2/2 orthostatic hypotension. However based on description of syncope: no prodrome, no associated palpitations or migraines or dizziness or lightheadedness, no loss of control over bowels or urine, and no shaking different from baseline resting parkinsons tremor.      Patient denies shortness of breath today. Only some trouble with breathing 2/2 SOB. 20x2 pack year hx. Quit 15 years ago.       After discussion, patient reports that he would like to move forward with surgery for deep brain stimulator. Patient wished for us to relay the message to his neurologist, so I will reach out.      Need to discuss preventive health screenings next visit    ROS:     Patient reports symptoms of passing out, deep breathing using abdominal muscle, baseline resting tremor that has been gradually worsening.   Other than what is discussed in history, remaining 12 point ROS negative.     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   • RA (rheumatoid arthritis) (HCC)    • Sleep apnea 3/27/2017     Social History     Tobacco Use   • Smoking status: Former Smoker     Packs/day: 2.00     Years: 25.00     Pack years: 50.00     Types: Cigarettes     Quit date:      Years since quittin.5   • Smokeless tobacco: Never Used   Vaping Use   • Vaping Use: Never used   Substance Use Topics   • Alcohol use: Not Currently     Alcohol/week: 0.6 - 1.2 oz     Types: 1 - 2 Shots of liquor per week     Comment: min   • Drug use: No     Current Outpatient Medications   Medication Sig Dispense Refill   • potassium chloride SA (KDUR) 20 MEQ Tab CR Take 2 Tablets by mouth 2 times a day for 4 doses. 8 Tablet 0   • carbidopa-levodopa CR (SINEMET CR)  MG per tablet Take 1 Tablet by mouth 2 times a day. 180 Tablet 2   • losartan (COZAAR) 100 MG Tab Take 1 Tablet by mouth every day. 90 Tablet 0   • atorvastatin (LIPITOR) 40 MG Tab Take 1 Tab by mouth every day. 90 Tab 3     No current facility-administered medications for this visit.     Physical Exam:  /74 (BP Location: Left arm, Patient Position: Sitting, BP Cuff Size: Adult)   Pulse 77   Temp 37.6 °C (99.7 °F) (Temporal)   Ht 1.854 m (6' 1\")   Wt 85.7 kg (189 lb)   SpO2 96%   BMI 24.94 kg/m²   General: Well developed, well nourished male, in no distress, normal weight, resting tremor  Eyes: Conjuntiva without any obvious injection or erythema.   Cardiovascular: Heart is regular without murmur  Lungs: Clear to auscultation bilaterally. No wheezes, rhonci or crackles heard. Respiratory effort is normal.   Abd: Soft, non-tender  Ext: No edema    Assessment and Plan:   Syncope and collapse  30-40 min of passing " out. Deep breathing when he passes out. Also tremors. Started after taking sinemet. Patient has no recollection. ER doctor said this was anxiety attack. Patient spoke with neurology about this problem. Dr advised continuing Sinemet. Per neurologist note, he suspects syncope 2/2 orthostatic hypotension. However based on description of syncope: no prodrome, no associated palpitations or migraines or dizziness or lightheadedness, no loss of control over bowels or urine, and no shaking different from baseline resting parkinsons tremor.      Parkinson's disease (HCC)  After discussion, patient reports that he would like to move forward with surgery for deep brain stimulator. Patient wished for us to relay the message to his neurologist, so I will reach out.      Hypokalemia  K of 2.8 few months ago. Repeat BMP ordered. Should it be low, will give supplements   Essential HTN  Sitting  this morning at 9AM. Standing  Took 5 mg amlodipine at 10AM. Patient initially said she retook BP at home at 11AM, and BP improved, but later denied taking repeat BP at home. Reports that only BP after med was in the clinic. In clinic at 1PM 138/77. Neurologist stopped amlodipine because he thought it may be contributing to passing out.

## 2022-06-28 ENCOUNTER — TELEPHONE (OUTPATIENT)
Dept: INTERNAL MEDICINE | Facility: OTHER | Age: 67
End: 2022-06-28

## 2022-06-28 ENCOUNTER — HOSPITAL ENCOUNTER (OUTPATIENT)
Dept: LAB | Facility: MEDICAL CENTER | Age: 67
End: 2022-06-28
Attending: STUDENT IN AN ORGANIZED HEALTH CARE EDUCATION/TRAINING PROGRAM
Payer: MEDICARE

## 2022-06-28 DIAGNOSIS — E87.6 HYPOKALEMIA: ICD-10-CM

## 2022-06-28 LAB
ANION GAP SERPL CALC-SCNC: 11 MMOL/L (ref 7–16)
BUN SERPL-MCNC: 12 MG/DL (ref 8–22)
CALCIUM SERPL-MCNC: 9.3 MG/DL (ref 8.5–10.5)
CHLORIDE SERPL-SCNC: 101 MMOL/L (ref 96–112)
CO2 SERPL-SCNC: 28 MMOL/L (ref 20–33)
CREAT SERPL-MCNC: 0.72 MG/DL (ref 0.5–1.4)
GFR SERPLBLD CREATININE-BSD FMLA CKD-EPI: 101 ML/MIN/1.73 M 2
GLUCOSE SERPL-MCNC: 92 MG/DL (ref 65–99)
POTASSIUM SERPL-SCNC: 3 MMOL/L (ref 3.6–5.5)
SODIUM SERPL-SCNC: 140 MMOL/L (ref 135–145)

## 2022-06-28 PROCEDURE — 80048 BASIC METABOLIC PNL TOTAL CA: CPT

## 2022-06-28 PROCEDURE — 36415 COLL VENOUS BLD VENIPUNCTURE: CPT

## 2022-06-28 RX ORDER — POTASSIUM CHLORIDE 20 MEQ/1
40 TABLET, EXTENDED RELEASE ORAL 2 TIMES DAILY
Qty: 8 TABLET | Refills: 0 | Status: SHIPPED | OUTPATIENT
Start: 2022-06-28 | End: 2022-06-30

## 2022-06-28 NOTE — TELEPHONE ENCOUNTER
Repeat K 3. Called patient. Told him in person about supplement I am sending. And repeat labwork to be done Monday or Tuesday of next week

## 2022-06-28 NOTE — TELEPHONE ENCOUNTER
Hi patients mother is requesting a call to discuss a few things in regards to the med that was just sent in. She would like ot speak to you     Thank you

## 2022-06-29 ENCOUNTER — TELEPHONE (OUTPATIENT)
Dept: INTERNAL MEDICINE | Facility: OTHER | Age: 67
End: 2022-06-29
Payer: MEDICARE

## 2022-06-29 PROBLEM — R55 SYNCOPE AND COLLAPSE: Status: ACTIVE | Noted: 2022-06-29

## 2022-06-29 PROBLEM — E87.6 HYPOKALEMIA: Status: ACTIVE | Noted: 2022-06-29

## 2022-06-29 PROBLEM — R55 SYNCOPE: Status: ACTIVE | Noted: 2022-06-29

## 2022-06-29 NOTE — ASSESSMENT & PLAN NOTE
30-40 min of passing out. Deep breathing when he passes out. Also tremors. Started after taking sinemet. Patient has no recollection. ER doctor said this was anxiety attack. Patient spoke with neurology about this problem. Dr advised continuing Sinemet. Per neurologist note, he suspects syncope 2/2 orthostatic hypotension. However based on description of syncope: no prodrome, no associated palpitations or migraines or dizziness or lightheadedness, no loss of control over bowels or urine, and no shaking different from baseline resting parkinsons tremor.

## 2022-06-29 NOTE — TELEPHONE ENCOUNTER
Argenis Phipps,    Hope you are well. I am the PCP for your patient Mr. Mitchell. So I was speaking to the patient and his mother. After careful discussion, they concluded that they want to try the deep brain stimulator option for parkinsons treatment. Would you please be able to place a referral for them to see a specialist for this?

## 2022-06-29 NOTE — ASSESSMENT & PLAN NOTE
After discussion, patient reports that he would like to move forward with surgery for deep brain stimulator. Patient wished for us to relay the message to his neurologist, so I will reach out.

## 2022-07-06 ENCOUNTER — TELEPHONE (OUTPATIENT)
Dept: NEUROLOGY | Facility: MEDICAL CENTER | Age: 67
End: 2022-07-06
Payer: MEDICARE

## 2022-07-06 DIAGNOSIS — R40.20 LOSS OF CONSCIOUSNESS (HCC): ICD-10-CM

## 2022-07-06 DIAGNOSIS — G20.A1 PARKINSON'S DISEASE (HCC): ICD-10-CM

## 2022-07-06 RX ORDER — LEVETIRACETAM 500 MG/1
500 TABLET ORAL 2 TIMES DAILY
Qty: 60 TABLET | Refills: 2 | Status: SHIPPED | OUTPATIENT
Start: 2022-07-06 | End: 2022-07-29

## 2022-07-06 RX ORDER — CARBIDOPA AND LEVODOPA 25; 100 MG/1; MG/1
1 TABLET, EXTENDED RELEASE ORAL 3 TIMES DAILY
Qty: 270 TABLET | Refills: 2 | Status: SHIPPED | OUTPATIENT
Start: 2022-07-06

## 2022-07-06 NOTE — TELEPHONE ENCOUNTER
I called patient's mother.       LOC spells are still occurring, but less frequently (2 times since last visit).   Parkinson's tremor is still bothersome.     Action:   -Increase carb/levo to 1 tab 3 times daily (up from 2 times daily)   -Start keppra 500mg BID. Seizure remains a possibility as EEG did not capture spell. I have elected to start empiric therapy for these LOC spells. Plan to re-evaluate need for drug at next visit.     Dr. Kuldeep Phipps D.O.  [facility]

## 2022-07-07 ENCOUNTER — TELEPHONE (OUTPATIENT)
Dept: INTERNAL MEDICINE | Facility: OTHER | Age: 67
End: 2022-07-07
Payer: MEDICARE

## 2022-07-07 ENCOUNTER — NON-PROVIDER VISIT (OUTPATIENT)
Dept: CARDIOLOGY | Facility: MEDICAL CENTER | Age: 67
End: 2022-07-07
Attending: STUDENT IN AN ORGANIZED HEALTH CARE EDUCATION/TRAINING PROGRAM
Payer: MEDICARE

## 2022-07-07 DIAGNOSIS — G20.A1 PARKINSON'S DISEASE (HCC): ICD-10-CM

## 2022-07-07 DIAGNOSIS — R55 SYNCOPE, UNSPECIFIED SYNCOPE TYPE: ICD-10-CM

## 2022-07-07 NOTE — TELEPHONE ENCOUNTER
VOICEMAIL  1. Caller Name: Tania Barnard (mother)                      Call Back Number: [phone]    2. Message: patients mother called and left message wanting to know if there was anyway Dr. Belcher or  could place a referral for physical therapy to be done at home. The mother thinks the patient could benefit from doing this since he has parkinson's and could use the exercise.    Please review    3. Patient approves office to leave a detailed voicemail/MyChart message: yes

## 2022-07-07 NOTE — PROGRESS NOTES
Home enrollment completed for the 14 day [employer] Holter monitoring program per Lety Belcher MD.  >Monitor to be mailed to patient by [employer].  >Still pending EOS/return as of 8/22/22..  This patient never returned his monitor, and he didn't respond to calls from [employer]. They have now marked it lost. This encounter will be no-charged and closed..

## 2022-07-08 ENCOUNTER — HOME HEALTH ADMISSION (OUTPATIENT)
Dept: HOME HEALTH SERVICES | Facility: HOME HEALTHCARE | Age: 67
End: 2022-07-08
Payer: MEDICARE

## 2022-07-08 RX ORDER — AMLODIPINE BESYLATE 5 MG/1
TABLET ORAL
Qty: 90 TABLET | Refills: 3 | OUTPATIENT
Start: 2022-07-08

## 2022-07-08 NOTE — TELEPHONE ENCOUNTER
Amlodipine Refill    Last seen: 6/27/22 by Dr. Belcher  Next appt: 8/10/22 with Dr. Desai    Was the patient seen in the last year in this department? Yes   Does patient have an active prescription for medications requested? No   Received Request Via: Pharmacy

## 2022-07-08 NOTE — TELEPHONE ENCOUNTER
Last visit note suggest this medication was stopped by neurology to due concerns for orthostatic changes. Unclear if PCP planned to continue as they did not feel patient has orthostatic hypotension.     Will not refill for this reason. Consider sooner follow up if needed and BP at home is consistently >150/90. Will have MA call patient.

## 2022-07-14 ENCOUNTER — HOSPITAL ENCOUNTER (INPATIENT)
Facility: MEDICAL CENTER | Age: 67
LOS: 3 days | DRG: 057 | End: 2022-07-20
Attending: EMERGENCY MEDICINE | Admitting: STUDENT IN AN ORGANIZED HEALTH CARE EDUCATION/TRAINING PROGRAM
Payer: MEDICARE

## 2022-07-14 ENCOUNTER — APPOINTMENT (OUTPATIENT)
Dept: RADIOLOGY | Facility: MEDICAL CENTER | Age: 67
DRG: 057 | End: 2022-07-14
Attending: EMERGENCY MEDICINE
Payer: MEDICARE

## 2022-07-14 DIAGNOSIS — R41.0 DELIRIUM: ICD-10-CM

## 2022-07-14 DIAGNOSIS — E87.6 HYPOKALEMIA: ICD-10-CM

## 2022-07-14 DIAGNOSIS — R07.9 CHEST PAIN, UNSPECIFIED TYPE: ICD-10-CM

## 2022-07-14 DIAGNOSIS — R06.02 SHORTNESS OF BREATH: ICD-10-CM

## 2022-07-14 DIAGNOSIS — G20.A1 PARKINSON'S DISEASE (HCC): ICD-10-CM

## 2022-07-14 PROBLEM — I48.92 ATRIAL FLUTTER (HCC): Status: ACTIVE | Noted: 2022-07-14

## 2022-07-14 PROBLEM — R06.00 DYSPNEA: Status: ACTIVE | Noted: 2022-07-14

## 2022-07-14 PROBLEM — Z71.89 ADVANCED CARE PLANNING/COUNSELING DISCUSSION: Status: ACTIVE | Noted: 2017-04-28

## 2022-07-14 LAB
ALBUMIN SERPL BCP-MCNC: 4.1 G/DL (ref 3.2–4.9)
ALBUMIN/GLOB SERPL: 1.6 G/DL
ALP SERPL-CCNC: 61 U/L (ref 30–99)
ALT SERPL-CCNC: 22 U/L (ref 2–50)
ANION GAP SERPL CALC-SCNC: 11 MMOL/L (ref 7–16)
ANION GAP SERPL CALC-SCNC: 12 MMOL/L (ref 7–16)
AST SERPL-CCNC: 16 U/L (ref 12–45)
BASOPHILS # BLD AUTO: 0.7 % (ref 0–1.8)
BASOPHILS # BLD: 0.04 K/UL (ref 0–0.12)
BILIRUB SERPL-MCNC: 1.2 MG/DL (ref 0.1–1.5)
BUN SERPL-MCNC: 11 MG/DL (ref 8–22)
BUN SERPL-MCNC: 8 MG/DL (ref 8–22)
CALCIUM SERPL-MCNC: 8.6 MG/DL (ref 8.5–10.5)
CALCIUM SERPL-MCNC: 9.1 MG/DL (ref 8.5–10.5)
CHLORIDE SERPL-SCNC: 105 MMOL/L (ref 96–112)
CHLORIDE SERPL-SCNC: 107 MMOL/L (ref 96–112)
CO2 SERPL-SCNC: 23 MMOL/L (ref 20–33)
CO2 SERPL-SCNC: 24 MMOL/L (ref 20–33)
CREAT SERPL-MCNC: 0.68 MG/DL (ref 0.5–1.4)
CREAT SERPL-MCNC: 0.74 MG/DL (ref 0.5–1.4)
EKG IMPRESSION: NORMAL
EOSINOPHIL # BLD AUTO: 0.11 K/UL (ref 0–0.51)
EOSINOPHIL NFR BLD: 1.9 % (ref 0–6.9)
ERYTHROCYTE [DISTWIDTH] IN BLOOD BY AUTOMATED COUNT: 41.1 FL (ref 35.9–50)
GFR SERPLBLD CREATININE-BSD FMLA CKD-EPI: 100 ML/MIN/1.73 M 2
GFR SERPLBLD CREATININE-BSD FMLA CKD-EPI: 103 ML/MIN/1.73 M 2
GLOBULIN SER CALC-MCNC: 2.6 G/DL (ref 1.9–3.5)
GLUCOSE SERPL-MCNC: 104 MG/DL (ref 65–99)
GLUCOSE SERPL-MCNC: 93 MG/DL (ref 65–99)
HCT VFR BLD AUTO: 41.7 % (ref 42–52)
HGB BLD-MCNC: 14.9 G/DL (ref 14–18)
IMM GRANULOCYTES # BLD AUTO: 0.01 K/UL (ref 0–0.11)
IMM GRANULOCYTES NFR BLD AUTO: 0.2 % (ref 0–0.9)
LYMPHOCYTES # BLD AUTO: 1.42 K/UL (ref 1–4.8)
LYMPHOCYTES NFR BLD: 25 % (ref 22–41)
MAGNESIUM SERPL-MCNC: 1.9 MG/DL (ref 1.5–2.5)
MAGNESIUM SERPL-MCNC: 2.1 MG/DL (ref 1.5–2.5)
MCH RBC QN AUTO: 31.8 PG (ref 27–33)
MCHC RBC AUTO-ENTMCNC: 35.7 G/DL (ref 33.7–35.3)
MCV RBC AUTO: 89.1 FL (ref 81.4–97.8)
MONOCYTES # BLD AUTO: 0.63 K/UL (ref 0–0.85)
MONOCYTES NFR BLD AUTO: 11.1 % (ref 0–13.4)
NEUTROPHILS # BLD AUTO: 3.47 K/UL (ref 1.82–7.42)
NEUTROPHILS NFR BLD: 61.1 % (ref 44–72)
NRBC # BLD AUTO: 0 K/UL
NRBC BLD-RTO: 0 /100 WBC
PLATELET # BLD AUTO: 235 K/UL (ref 164–446)
PMV BLD AUTO: 8.8 FL (ref 9–12.9)
POTASSIUM SERPL-SCNC: 3 MMOL/L (ref 3.6–5.5)
POTASSIUM SERPL-SCNC: 3.3 MMOL/L (ref 3.6–5.5)
PROT SERPL-MCNC: 6.7 G/DL (ref 6–8.2)
RBC # BLD AUTO: 4.68 M/UL (ref 4.7–6.1)
SODIUM SERPL-SCNC: 141 MMOL/L (ref 135–145)
SODIUM SERPL-SCNC: 141 MMOL/L (ref 135–145)
TROPONIN T SERPL-MCNC: 20 NG/L (ref 6–19)
TROPONIN T SERPL-MCNC: 21 NG/L (ref 6–19)
TROPONIN T SERPL-MCNC: 25 NG/L (ref 6–19)
WBC # BLD AUTO: 5.7 K/UL (ref 4.8–10.8)

## 2022-07-14 PROCEDURE — 700102 HCHG RX REV CODE 250 W/ 637 OVERRIDE(OP)

## 2022-07-14 PROCEDURE — 93005 ELECTROCARDIOGRAM TRACING: CPT

## 2022-07-14 PROCEDURE — 80048 BASIC METABOLIC PNL TOTAL CA: CPT

## 2022-07-14 PROCEDURE — 83735 ASSAY OF MAGNESIUM: CPT

## 2022-07-14 PROCEDURE — 71045 X-RAY EXAM CHEST 1 VIEW: CPT

## 2022-07-14 PROCEDURE — 83605 ASSAY OF LACTIC ACID: CPT

## 2022-07-14 PROCEDURE — 80053 COMPREHEN METABOLIC PANEL: CPT

## 2022-07-14 PROCEDURE — 99220 PR INITIAL OBSERVATION CARE,LEVL III: CPT | Mod: GC | Performed by: STUDENT IN AN ORGANIZED HEALTH CARE EDUCATION/TRAINING PROGRAM

## 2022-07-14 PROCEDURE — 99285 EMERGENCY DEPT VISIT HI MDM: CPT

## 2022-07-14 PROCEDURE — A9270 NON-COVERED ITEM OR SERVICE: HCPCS | Performed by: EMERGENCY MEDICINE

## 2022-07-14 PROCEDURE — G0378 HOSPITAL OBSERVATION PER HR: HCPCS

## 2022-07-14 PROCEDURE — 700102 HCHG RX REV CODE 250 W/ 637 OVERRIDE(OP): Performed by: INTERNAL MEDICINE

## 2022-07-14 PROCEDURE — 85025 COMPLETE CBC W/AUTO DIFF WBC: CPT

## 2022-07-14 PROCEDURE — A9270 NON-COVERED ITEM OR SERVICE: HCPCS | Performed by: INTERNAL MEDICINE

## 2022-07-14 PROCEDURE — 700102 HCHG RX REV CODE 250 W/ 637 OVERRIDE(OP): Performed by: EMERGENCY MEDICINE

## 2022-07-14 PROCEDURE — 84484 ASSAY OF TROPONIN QUANT: CPT

## 2022-07-14 PROCEDURE — A9270 NON-COVERED ITEM OR SERVICE: HCPCS

## 2022-07-14 PROCEDURE — 97163 PT EVAL HIGH COMPLEX 45 MIN: CPT

## 2022-07-14 PROCEDURE — 36415 COLL VENOUS BLD VENIPUNCTURE: CPT

## 2022-07-14 PROCEDURE — 93005 ELECTROCARDIOGRAM TRACING: CPT | Performed by: EMERGENCY MEDICINE

## 2022-07-14 RX ORDER — DOCUSATE SODIUM 100 MG/1
100 CAPSULE, LIQUID FILLED ORAL 3 TIMES DAILY PRN
COMMUNITY

## 2022-07-14 RX ORDER — ASPIRIN 81 MG/1
324 TABLET, CHEWABLE ORAL ONCE
Status: COMPLETED | OUTPATIENT
Start: 2022-07-14 | End: 2022-07-14

## 2022-07-14 RX ORDER — POLYETHYLENE GLYCOL 3350 17 G/17G
1 POWDER, FOR SOLUTION ORAL
Status: DISCONTINUED | OUTPATIENT
Start: 2022-07-14 | End: 2022-07-20 | Stop reason: HOSPADM

## 2022-07-14 RX ORDER — AMOXICILLIN 250 MG
2 CAPSULE ORAL 2 TIMES DAILY
Status: DISCONTINUED | OUTPATIENT
Start: 2022-07-14 | End: 2022-07-20 | Stop reason: HOSPADM

## 2022-07-14 RX ORDER — ATORVASTATIN CALCIUM 40 MG/1
40 TABLET, FILM COATED ORAL
Status: DISCONTINUED | OUTPATIENT
Start: 2022-07-14 | End: 2022-07-20 | Stop reason: HOSPADM

## 2022-07-14 RX ORDER — ENOXAPARIN SODIUM 100 MG/ML
40 INJECTION SUBCUTANEOUS DAILY
Status: DISCONTINUED | OUTPATIENT
Start: 2022-07-14 | End: 2022-07-14

## 2022-07-14 RX ORDER — LOSARTAN POTASSIUM 50 MG/1
100 TABLET ORAL EVERY MORNING
Refills: 0 | Status: DISCONTINUED | OUTPATIENT
Start: 2022-07-14 | End: 2022-07-20 | Stop reason: HOSPADM

## 2022-07-14 RX ORDER — LEVETIRACETAM 500 MG/1
500 TABLET ORAL 2 TIMES DAILY
Status: DISCONTINUED | OUTPATIENT
Start: 2022-07-14 | End: 2022-07-20 | Stop reason: HOSPADM

## 2022-07-14 RX ORDER — POTASSIUM CHLORIDE 20 MEQ/1
40 TABLET, EXTENDED RELEASE ORAL
Status: COMPLETED | OUTPATIENT
Start: 2022-07-14 | End: 2022-07-14

## 2022-07-14 RX ORDER — AMLODIPINE BESYLATE 5 MG/1
5 TABLET ORAL DAILY
COMMUNITY

## 2022-07-14 RX ORDER — BISACODYL 10 MG
10 SUPPOSITORY, RECTAL RECTAL
Status: DISCONTINUED | OUTPATIENT
Start: 2022-07-14 | End: 2022-07-20 | Stop reason: HOSPADM

## 2022-07-14 RX ORDER — CARBIDOPA AND LEVODOPA 25; 100 MG/1; MG/1
1 TABLET, EXTENDED RELEASE ORAL 3 TIMES DAILY
Status: DISCONTINUED | OUTPATIENT
Start: 2022-07-14 | End: 2022-07-16

## 2022-07-14 RX ORDER — AMLODIPINE BESYLATE 5 MG/1
5 TABLET ORAL DAILY
Status: DISCONTINUED | OUTPATIENT
Start: 2022-07-14 | End: 2022-07-20 | Stop reason: HOSPADM

## 2022-07-14 RX ORDER — IPRATROPIUM BROMIDE AND ALBUTEROL SULFATE 2.5; .5 MG/3ML; MG/3ML
3 SOLUTION RESPIRATORY (INHALATION)
Status: DISCONTINUED | OUTPATIENT
Start: 2022-07-14 | End: 2022-07-20 | Stop reason: HOSPADM

## 2022-07-14 RX ADMIN — AMLODIPINE BESYLATE 5 MG: 5 TABLET ORAL at 08:15

## 2022-07-14 RX ADMIN — LOSARTAN POTASSIUM 100 MG: 50 TABLET, FILM COATED ORAL at 08:15

## 2022-07-14 RX ADMIN — CARBIDOPA AND LEVODOPA 1 TABLET: 25; 100 TABLET, EXTENDED RELEASE ORAL at 10:30

## 2022-07-14 RX ADMIN — RIVAROXABAN 20 MG: 20 TABLET, FILM COATED ORAL at 17:59

## 2022-07-14 RX ADMIN — POTASSIUM CHLORIDE 40 MEQ: 1500 TABLET, EXTENDED RELEASE ORAL at 10:30

## 2022-07-14 RX ADMIN — CARBIDOPA AND LEVODOPA 1 TABLET: 25; 100 TABLET, EXTENDED RELEASE ORAL at 22:18

## 2022-07-14 RX ADMIN — ATORVASTATIN CALCIUM 40 MG: 40 TABLET, FILM COATED ORAL at 22:18

## 2022-07-14 RX ADMIN — LEVETIRACETAM 500 MG: 500 TABLET, FILM COATED ORAL at 22:17

## 2022-07-14 RX ADMIN — LEVETIRACETAM 500 MG: 500 TABLET, FILM COATED ORAL at 08:15

## 2022-07-14 RX ADMIN — POTASSIUM CHLORIDE 40 MEQ: 1500 TABLET, EXTENDED RELEASE ORAL at 07:23

## 2022-07-14 RX ADMIN — SENNOSIDES AND DOCUSATE SODIUM 2 TABLET: 50; 8.6 TABLET ORAL at 18:00

## 2022-07-14 RX ADMIN — ASPIRIN 324 MG: 81 TABLET, CHEWABLE ORAL at 04:54

## 2022-07-14 RX ADMIN — CARBIDOPA AND LEVODOPA 1 TABLET: 25; 100 TABLET, EXTENDED RELEASE ORAL at 16:42

## 2022-07-14 ASSESSMENT — PATIENT HEALTH QUESTIONNAIRE - PHQ9
3. TROUBLE FALLING OR STAYING ASLEEP OR SLEEPING TOO MUCH: NOT AT ALL
SUM OF ALL RESPONSES TO PHQ9 QUESTIONS 1 AND 2: 1
9. THOUGHTS THAT YOU WOULD BE BETTER OFF DEAD, OR OF HURTING YOURSELF: NOT AT ALL
5. POOR APPETITE OR OVEREATING: NOT AT ALL
1. LITTLE INTEREST OR PLEASURE IN DOING THINGS: NOT AT ALL
4. FEELING TIRED OR HAVING LITTLE ENERGY: NOT AT ALL
2. FEELING DOWN, DEPRESSED, IRRITABLE, OR HOPELESS: SEVERAL DAYS
8. MOVING OR SPEAKING SO SLOWLY THAT OTHER PEOPLE COULD HAVE NOTICED. OR THE OPPOSITE, BEING SO FIGETY OR RESTLESS THAT YOU HAVE BEEN MOVING AROUND A LOT MORE THAN USUAL: NOT AT ALL
6. FEELING BAD ABOUT YOURSELF - OR THAT YOU ARE A FAILURE OR HAVE LET YOURSELF OR YOUR FAMILY DOWN: NOT AL ALL
7. TROUBLE CONCENTRATING ON THINGS, SUCH AS READING THE NEWSPAPER OR WATCHING TELEVISION: NOT AT ALL
SUM OF ALL RESPONSES TO PHQ QUESTIONS 1-9: 1

## 2022-07-14 ASSESSMENT — LIFESTYLE VARIABLES
TOTAL SCORE: 0
HAVE PEOPLE ANNOYED YOU BY CRITICIZING YOUR DRINKING: NO
HAVE PEOPLE ANNOYED YOU BY CRITICIZING YOUR DRINKING: NO
TOTAL SCORE: 0
CONSUMPTION TOTAL: INCOMPLETE
EVER FELT BAD OR GUILTY ABOUT YOUR DRINKING: NO
ALCOHOL_USE: NO
EVER HAD A DRINK FIRST THING IN THE MORNING TO STEADY YOUR NERVES TO GET RID OF A HANGOVER: NO
DOES PATIENT WANT TO STOP DRINKING: NO
ALCOHOL_USE: NO
TOTAL SCORE: 0
EVER HAD A DRINK FIRST THING IN THE MORNING TO STEADY YOUR NERVES TO GET RID OF A HANGOVER: NO
TOTAL SCORE: 0
HAVE YOU EVER FELT YOU SHOULD CUT DOWN ON YOUR DRINKING: NO
CONSUMPTION TOTAL: INCOMPLETE
HAVE YOU EVER FELT YOU SHOULD CUT DOWN ON YOUR DRINKING: NO
TOTAL SCORE: 0
TOTAL SCORE: 0
EVER FELT BAD OR GUILTY ABOUT YOUR DRINKING: NO

## 2022-07-14 ASSESSMENT — CHA2DS2 SCORE
CHA2DS2 VASC SCORE: 3
PRIOR STROKE OR TIA OR THROMBOEMBOLISM: NO
HYPERTENSION: YES
VASCULAR DISEASE: NO
DIABETES: YES
SEX: MALE
AGE 75 OR GREATER: NO
AGE 65 TO 74: YES
CHF OR LEFT VENTRICULAR DYSFUNCTION: NO

## 2022-07-14 ASSESSMENT — COGNITIVE AND FUNCTIONAL STATUS - GENERAL
MOVING FROM LYING ON BACK TO SITTING ON SIDE OF FLAT BED: A LITTLE
WALKING IN HOSPITAL ROOM: A LITTLE
TURNING FROM BACK TO SIDE WHILE IN FLAT BAD: A LITTLE
CLIMB 3 TO 5 STEPS WITH RAILING: A LITTLE
SUGGESTED CMS G CODE MODIFIER MOBILITY: CK
MOBILITY SCORE: 18
STANDING UP FROM CHAIR USING ARMS: A LITTLE
MOVING TO AND FROM BED TO CHAIR: A LITTLE

## 2022-07-14 ASSESSMENT — GAIT ASSESSMENTS
DISTANCE (FEET): 40
GAIT LEVEL OF ASSIST: CONTACT GUARD ASSIST
ASSISTIVE DEVICE: NONE;FRONT WHEEL WALKER

## 2022-07-14 ASSESSMENT — COPD QUESTIONNAIRES
HAVE YOU SMOKED AT LEAST 100 CIGARETTES IN YOUR ENTIRE LIFE: NO/DON'T KNOW
DO YOU EVER COUGH UP ANY MUCUS OR PHLEGM?: NO/ONLY WITH OCCASIONAL COLDS OR INFECTIONS
DURING THE PAST 4 WEEKS HOW MUCH DID YOU FEEL SHORT OF BREATH: NONE/LITTLE OF THE TIME
COPD SCREENING SCORE: 2

## 2022-07-14 ASSESSMENT — PAIN DESCRIPTION - PAIN TYPE
TYPE: ACUTE PAIN

## 2022-07-14 ASSESSMENT — FIBROSIS 4 INDEX
FIB4 SCORE: 0.94
FIB4 SCORE: 0.96

## 2022-07-14 NOTE — THERAPY
"Physical Therapy   Initial Evaluation     Patient Name: Bladimir Mitchell  Age:  66 y.o., Sex:  male  Medical Record #: [id number]  Today's Date: 7/14/2022     Precautions  Precautions: Fall Risk    Assessment  Pt is a 66 year old male who presents to the ED for evaluation of acute ALOC. Pt has a history of Parkinson's disease. Pt's mother called EMS due to the patient having \"hard breathing and shaking\". Pt with new onset atrial flutter. Pt demonstrates to be near his baseline but presents with multiple mobility deficits. Pt was able to mobilize, as detailed below, but demonstrated deficits in STS, multiple gait characteristic deficits, and balance. Pt will benefit from skilled physical therapy services while in house. Currently recommending home health services for continued physical therapy services upon DC.     Plan    Recommend Physical Therapy 3 times per week until therapy goals are met for the following treatments:  Bed Mobility, Community Re-integration, Equipment, Gait Training, Manual Therapy, Neuro Re-Education / Balance, Self Care/Home Evaluation, Sensory Integration Techniques, Stair Training, Therapeutic Activities, and Therapeutic Exercises    DC Equipment Recommendations: Front-Wheel Walker  Discharge Recommendations: Recommend home health for continued physical therapy services          Objective     07/14/22 1131   Initial Contact Note    Initial Contact Note Order Received and Verified, Physical Therapy Evaluation in Progress with Full Report to Follow.   Precautions   Precautions Fall Risk   Prior Living Situation   Prior Services None   Housing / Facility 1 Story Apartment / Condo   Steps Into Home 0   Steps In Home 0   Bathroom Set up Bathtub / Shower Combination   Equipment Owned   (Is set up to get grab bars)   Lives with - Patient's Self Care Capacity Parents  (Mom (she is his caregiver))   Prior Level of Functional Mobility   Bed Mobility Independent   Transfer Status Independent "   Ambulation Independent   Distance Ambulation (Feet)   (community distances)   Assistive Devices Used None   Stairs Independent   Cognition    Level of Consciousness Alert   Passive ROM Lower Body   Passive ROM Lower Body WDL   Active ROM Lower Body    Active ROM Lower Body  WDL   Comments intermittently limited by bradykinesia and rigidity from PD, but is functional   Strength Lower Body   Lower Body Strength  X   Gross Strength Generalized Weakness, Equal Bilaterally   Comments B LE 4-/5   Sensation Lower Body   Lower Extremity Sensation   WDL   Balance Assessment   Sitting Balance (Static) Fair   Sitting Balance (Dynamic) Fair   Standing Balance (Static) Fair   Standing Balance (Dynamic) Fair -   Weight Shift Sitting Fair   Weight Shift Standing Poor   Gait Analysis   Gait Level Of Assist Contact Guard Assist   Assistive Device None;Front Wheel Walker  (Amb with FWW for 20 feet)   Distance (Feet) 40  (20 with AD and 20 without AD)   # of Times Distance was Traveled 1   Deviation Shuffled Gait;Bradykinetic;Decreased Base Of Support;Decreased Toe Off;Decreased Heel Strike  (short strides, No arm swing, poor turning)   Weight Bearing Status No restrictions   Bed Mobility    Supine to Sit Standby Assist   Sit to Supine Standby Assist   Scooting Standby Assist   Rolling Standby Assist   Functional Mobility   Sit to Stand Contact Guard Assist   Bed, Chair, Wheelchair Transfer Contact Guard Assist   Transfer Method Stand Step   Mobility sup to sit, sts, gait, sit to sup   Comments Pt completed 1 STS at EOB to use urinal, then ambulated in hallway with PT   How much difficulty does the patient currently have...   Turning over in bed (including adjusting bedclothes, sheets and blankets)? 3   Sitting down on and standing up from a chair with arms (e.g., wheelchair, bedside commode, etc.) 3   Moving from lying on back to sitting on the side of the bed? 3   How much help from another person does the patient currently  need...   Moving to and from a bed to a chair (including a wheelchair)? 3   Need to walk in a hospital room? 3   Climbing 3-5 steps with a railing? 3   6 clicks Mobility Score 18   Activity Tolerance   Sitting Edge of Bed 5 min   Standing 10 min   Short Term Goals    Short Term Goal # 1 Pt will complete bed mobility without use of bed features with SPV within 6 visits   Short Term Goal # 2 Pt will complete functional transfers with SPV with LRAD within 6 visits to improve mobility   Short Term Goal # 3 Pt will ambulate 200 feet with LRAD with SBA within 6 visits to improve mobility   Short Term Goal # 4 Pt will be able to ascend/descend 2 steps to allow for community reintegration with SBA with LRAD within 6 visits   Education Group   Education Provided Role of Physical Therapist   Role of Physical Therapist Patient Response Patient;Acceptance;Explanation;Verbal Demonstration   Problem List    Problems Pain;Impaired Bed Mobility;Impaired Transfers;Impaired Ambulation;Functional Strength Deficit;Impaired Balance;Decreased Activity Tolerance;Motor Planning / Sequencing   Anticipated Discharge Equipment and Recommendations   DC Equipment Recommendations Front-Wheel Walker   Discharge Recommendations Recommend home health for continued physical therapy services   Interdisciplinary Plan of Care Collaboration   IDT Collaboration with  ;Nursing   Patient Position at End of Therapy In Bed;Call Light within Reach;Tray Table within Reach;Phone within Reach   Session Information   Date / Session Number  7/14-1(1/3, 7/20)

## 2022-07-14 NOTE — ED NOTES
Pt found to be up in rm w/o assistance. Pt explained POC, directed back to bed, fall risk in place and instruction given to pt. Pt made this RN aware that he will use call bell to notify staff.

## 2022-07-14 NOTE — ASSESSMENT & PLAN NOTE
Nonfocal dyspnea, chronic, intermittent, worse on exertion  - Currently on room air  - rule out covid

## 2022-07-14 NOTE — ASSESSMENT & PLAN NOTE
Blood pressure elevated on admission  - Continue home losartan  - If blood pressure still elevated can consider IV as needed

## 2022-07-14 NOTE — PROGRESS NOTES
Picked up patient and received report from Leroy HUTSON from ED and transported him to room on TELE in wheelchair. Patient resting comfortably in bed. Safety precautions in place, personal items within reach, call light in hand, instructions on how to use the call light completed, and patient stated that there is no needs at this time. Will continue to do monitor patient.

## 2022-07-14 NOTE — ED NOTES
Med Rec Complete per patient & patient's mother  Allergies Reviewed with patient  No antibiotics within the last 30 days  Patient's Preferred Pharmacy: [facility] on [address].

## 2022-07-14 NOTE — ED TRIAGE NOTES
"Bladimir Mitchell  66 y.o. male  Chief Complaint   Patient presents with   • ALOC     Hx Parkinsons, mother reports \"hard breathing and shaking\". Pt mother states he could not answer questions appropriately today, EMS states same.     Pt ambulatory with staggering gait to triage for above complaint. Pt alert and oriented x4. No longer in \"flare up\" mother, Tania, described on phone.    Pt is alert, oriented, and follows commands. Pt speaking in full sentences and responds appropriately to questions. No acute distress noted in triage and respirations are even and unlabored.     Pt placed in lobby and educated on triage process. Pt encouraged to alert staff for any changes in condition.    "

## 2022-07-14 NOTE — DISCHARGE INSTRUCTIONS
FOLLOW UP ITEMS POST DISCHARGE  Please call [phone] to schedule PCP appointment for patient.    Required specialty appointments include:         Discharge Instructions per ADRYAN Jasso     -Patient to transfer to [facility] skilled facility and continue therapy  -Resume all home medication        DIET: As tolerated     ACTIVITY: As tolerated     DIAGNOSIS: Shortness of breath     Return to ER if symptoms persist, chest pain, palpitations, shortness of breath, numbness, tingling, weakness, and high fevers.

## 2022-07-14 NOTE — DISCHARGE PLANNING
HTH/SCP TCN chart review completed. Collaborated with Bedside RN Negin ballard.The most current review of medical record, knowledge of pt's PLOF and social support, LACE+ score of 48, 6 clicks scores of  ( none entered) mobility were considered.      Pt seen at bedside, standing being held by the hand by Bedside RN whilst bed is being switched into a regular hospital bed. Mbr is AXOx3 at the moment and states he is able to sign paperwork. Witnessed mbr being fatigues, weak, tremulous ( hands) . Mbr states he lives with his Mother and endorses being independent previously. Mbr states transportation is an issue. TCN gave mbr [facility] customer service tel# [phone] for Medical [employer]. Mbr denies additional need for food/houing assistance. Mbr did not state that he uses DME for mobility.      Introduced TCN program. Provided education regarding differences in post acute resources including IRF, SNF and HH ; [facility] transitional care services. Pt verbalizes understanding. Requested provider consults for PT OT armen from Dr Sagastume via voalte. Choice forms proactively obtained for SNF and HH and given to Primary CM Liz DALAL Proactively sending referral to [facility]. TCN will continue to follow and collaborate with discharge planning team as additional post acute needs arise. Thank you.     Completed today 7/14/2022:  -Requested and Obtained MD order for PT OT eval  _Obtained Choice forms: SNF, HH  _[facility] referral ( sent)

## 2022-07-14 NOTE — H&P
Attending attestation:  I have seen and examined the patient and reviewed the case by the resident physician, Dr Kline. I have also reviewed the patient's history, ROS, physical exam, laboratory and radiology findings. I agree with the general assessments, plans, and discussions as outlined by the resident notes below and in the orders except as noted in the attending comments below. I was physically present/availble for the critical portions of the exam/evaluation as necessary and appropriate. I am actively involved in the patient's care. Please see Resident Physician History and Physical for additional details.    Additional Attending Comments: 66-year-old man with history of Parkinson's presenting with intermittent shortness of breath.  See resident note below for full H&P.  Apparently new a flutter, continued intermittent symptoms in ED though no hypoxia.  Troponin very mildly elevated at 20 that increased to 25, no chest pain.  Admit for observation, telemetry, repeat EKG, repeat troponin once more, optimize electrolytes, discuss need/risk/benefit of anticoagulation.      Date of Admission: 7/14/2022  Admission Status: Emergency  [facility] Team: [facility]O  Attending: Rafael Velázquez M.D.   Resident: Dr. Kevin Kline  Contact Number: [phone]    Chief Complaint:   Shortness of breath    History of Present Illness (HPI):   Patient 66-year-old male with past medical history of Parkinson's disease, hypokalemia, hypertension who presented 7/14 for concerns with shortness of breath.  Patient states that the shortness of breath is intermittent, worse with exertion, not associated with chest pain, dizziness, or lightheadedness.  The shortness of breath has been a chronic issue that he has been dealing with, not worsened from previous episodes.  Currently on exam, patient denies any shortness of breath and was brought in by his mother for these conditions according to the patient.  He is alert and oriented x4.    In  ED: Temperature 36.8 °C, blood pressure 177/116, pulse 98, respiratory 20, oxygen 100% on room air  Labs: WBC 5.7, sodium 141, potassium 3.0, troponin 20, 25,   EKG revealing atrial flutter with 4:1 conduction rate on my read    Review of Systems:   Review of Systems   Constitutional: Negative for chills and fever.   HENT: Negative for sinus pain.    Eyes: Negative for double vision.   Respiratory: Negative for cough, shortness of breath and wheezing.    Cardiovascular: Negative for chest pain, palpitations and leg swelling.   Gastrointestinal: Negative for abdominal pain, constipation, diarrhea, nausea and vomiting.   Genitourinary: Negative for dysuria.   Musculoskeletal: Negative for myalgias.   Skin: Negative for rash.   Neurological: Positive for tremors. Negative for loss of consciousness, weakness and headaches.       Past Medical History:   Past Medical History was reviewed with patient.   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    Past Surgical History: Past Surgical History was reviewed with patient.   has a past surgical history that includes abdominal exploration and cholecystectomy.    Medications: Medications have been reviewed with patient.  Prior to Admission Medications   Prescriptions Last Dose Informant Patient Reported? Taking?   atorvastatin (LIPITOR) 40 MG Tab   No No   Sig: Take 1 Tab by mouth every day.   carbidopa-levodopa CR (SINEMET CR)  MG per tablet   No No   Sig: Take 1 Tablet by mouth 3 times a day.   levETIRAcetam (KEPPRA) 500 MG Tab   No No   Sig: Take 1 Tablet by mouth 2 times a day.   losartan (COZAAR) 100 MG Tab   No No   Sig: Take 1 Tablet by  mouth every day.      Facility-Administered Medications: None        Allergies: Allergies have been reviewed with patient.  No Known Allergies    Family History:   family history includes No Known Problems in his father, maternal grandfather, maternal grandmother, mother, paternal grandfather, and paternal grandmother.     Social History:   Tobacco: Denies   Alcohol: Denies   Recreational drugs (illegal and prescription):  Denies   Living situation:  [place]    Primary Care Provider: reviewed Lety Belcher M.D.  Other (stressors, spirituality, exposures):    Physical Exam:   Vitals:  Temp:  [36.8 °C (98.2 °F)] 36.8 °C (98.2 °F)  Pulse:  [98] 98  Resp:  [20] 20  BP: (177-182)/(115-116) 177/116  SpO2:  [100 %] 100 %    Physical Exam  Vitals and nursing note reviewed.   Constitutional:       General: He is not in acute distress.  HENT:      Head: Normocephalic and atraumatic.      Mouth/Throat:      Mouth: Mucous membranes are moist.   Eyes:      General: No scleral icterus.     Extraocular Movements: Extraocular movements intact.      Pupils: Pupils are equal, round, and reactive to light.   Cardiovascular:      Rate and Rhythm: Normal rate and regular rhythm.      Heart sounds: No murmur heard.    No friction rub. No gallop.   Pulmonary:      Effort: Pulmonary effort is normal. No respiratory distress.      Breath sounds: No wheezing, rhonchi or rales.   Abdominal:      General: Abdomen is flat. Bowel sounds are normal. There is no distension.      Tenderness: There is no abdominal tenderness. There is no guarding or rebound.   Musculoskeletal:         General: No swelling or tenderness.      Right lower leg: No edema.      Left lower leg: No edema.   Skin:     General: Skin is warm.   Neurological:      General: No focal deficit present.      Mental Status: He is alert and oriented to person, place, and time. Mental status is at baseline.      Cranial Nerves: No cranial nerve  deficit.         Labs:   HEMATOLOGY/ ONCOLOGY/ID:            Recent Labs     07/14/22  0156   WBC 5.7   RBC 4.68*   HEMOGLOBIN 14.9   HEMATOCRIT 41.7*   MCV 89.1   MCH 31.8   RDW 41.1   PLATELETCT 235   MPV 8.8*   NEUTSPOLYS 61.10   LYMPHOCYTES 25.00   MONOCYTES 11.10   EOSINOPHILS 1.90   BASOPHILS 0.70     Lab Results   Component Value Date    CNMAMBLU86 967 (H) 12/06/2019       RENAL:        Estimated GFR/CRCL = Estimated Creatinine Clearance: 112.5 mL/min (by C-G formula based on SCr of 0.74 mg/dL).  Recent Labs     07/14/22  0156   SODIUM 141   POTASSIUM 3.0*   CHLORIDE 107   CO2 23   GLUCOSE 93   BUN 11   CREATININE 0.74   CALCIUM 9.1       GASTROINTESTINAL/ HEPATIC:          No results for input(s): ALTSGPT, ASTSGOT, ALKPHOSPHAT, TBILIRUBIN, DBILIRUBIN, GAMMAGT, LIPASE, ALBUMIN, GLOBULIN, PREALBUMIN, INR, MACROCYTOSIS in the last 72 hours.  No results found for: AMMONIA    ENDOCRINE:              Recent Labs     07/14/22  0156   GLUCOSE 93     Lab Results   Component Value Date    HBA1C 5.1 04/27/2022    HBA1C 5.8 (H) 12/06/2019        EKG: Per my read, atrial flutter with 4: 1 AV conduction.  No prior EKG for comparison    Imaging:   DX-CHEST-PORTABLE (1 VIEW)   Final Result      1.  No acute cardiac or pulmonary abnormalities are identified.            Previous Data Review: Reviewed    Assessment/Plan:    I anticipate patient will not require at least 2 midnight stays because of stability medical conditions, patient was recently diagnosed with atrial flutter not on anticoagulation.  To be admitted for optimization of electrolytes as well as risk versus benefit discussion of anticoagulation      Atrial flutter (HCC)- (present on admission)  Assessment & Plan  EKG demonstrating 4:1 conduction, no prior comparison.  Not currently on anticoagulation  - Currently rate controlled, not on AV blockers  - Will need risk versus benefit discussion of anticoagulation  - [id number] score of at least 2     Dyspnea-  (present on admission)  Assessment & Plan  Nonfocal dyspnea, chronic, intermittent, worse on exertion  - Currently on room air  - Currently asymptomatic, outpatient work-up/follow-up    Hypokalemia- (present on admission)  Assessment & Plan  Monitor and replace as needed, appears chronic    Parkinson's disease (HCC)- (present on admission)  Assessment & Plan  Continue home Sinemet    Advanced care planning/counseling discussion  Assessment & Plan  I spent 17 minutes at bedside with patient discussing work-up, results, diagnosis, prognosis.  CODE STATUS discussed with patient and wants to be Full code.    Essential hypertension- (present on admission)  Assessment & Plan  Blood pressure elevated on admission  - Continue home losartan  - If blood pressure still elevated can consider IV as needed       Code status: Full  Tubes/lines/drains: PIV  Diet: Cardiac  DVT PPX: Lovenox  GI PPX: Bowel regimen  Disposition: TBD    This note was generated using voice recognition software which has a small chance of producing errors of grammar and possibly content. I have made every reasonable attempt to find and correct any obvious errors, but expect that some may not be found prior to finalization of this note.

## 2022-07-14 NOTE — CARE PLAN
Problem: Knowledge Deficit - Standard  Goal: Patient and family/care givers will demonstrate understanding of plan of care, disease process/condition, diagnostic tests and medications  Outcome: Progressing     Problem: Skin Integrity  Goal: Skin integrity is maintained or improved  Outcome: Progressing   The patient is resting comfortably and using call light appropriately. No concerns at this time.          Progress made toward(s) clinical / shift goals:  Patient is resting comfortably and communicating his needs clearly and appropriately.     Patient is not progressing towards the following goals:

## 2022-07-14 NOTE — ED PROVIDER NOTES
"ER Provider Note     Scribed for Rafael Velázquez M.D. by Bernard Long. 2022, 1:38 AM.    Primary Care Provider: Lety Belcher M.D.  Means of Arrival: EMS   History obtained from: Patient  History limited by: None     CHIEF COMPLAINT  Chief Complaint   Patient presents with   • ALOC     Hx Parkinsons, mother reports \"hard breathing and shaking\". Pt mother states he could not answer questions appropriately today, EMS states same.       HPI  Bladimir Mitchell is a 66 y.o. male who presents to the Emergency Department via EMS for evaluation of acute ALOC that began this evening. Per triage note, his mother feels that he is having a flare up of his Parkinsons symptoms. Tonight she called EMS because he was having an episode of \"hard breathing and shaking.\" This has happened several times before but tonight was the most severe. At this time his symptoms have resolved.    REVIEW OF SYSTEMS  See HPI for further details. All other systems are negative.     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    SURGICAL HISTORY   has a past surgical history that includes abdominal exploration and cholecystectomy.    SOCIAL HISTORY  Social History     Tobacco Use   • Smoking status: Former Smoker     Packs/day: 2.00     Years: 25.00     Pack years: 50.00     Types: Cigarettes     Quit date:      Years since quittin.5   • Smokeless tobacco: Never Used   Vaping Use   • Vaping Use: Never used   Substance Use Topics   • Alcohol use: Not Currently     Alcohol/week: 0.6 - 1.2 oz     Types: 1 - 2 Shots of liquor per week     Comment: min   • Drug use: No      Social " "History     Substance and Sexual Activity   Drug Use No       FAMILY HISTORY  Family History   Problem Relation Age of Onset   • No Known Problems Mother    • No Known Problems Father    • No Known Problems Maternal Grandmother    • No Known Problems Maternal Grandfather    • No Known Problems Paternal Grandmother    • No Known Problems Paternal Grandfather        CURRENT MEDICATIONS  Current Outpatient Medications   Medication Instructions   • atorvastatin (LIPITOR) 40 mg, Oral, DAILY   • carbidopa-levodopa CR (SINEMET CR)  MG per tablet 1 Tablet, Oral, 3 TIMES DAILY   • levETIRAcetam (KEPPRA) 500 mg, Oral, 2 TIMES DAILY   • losartan (COZAAR) 100 mg, Oral, EVERY DAY       ALLERGIES  No Known Allergies    PHYSICAL EXAM  VITAL SIGNS: BP (!) 177/116   Pulse 98   Temp 36.8 °C (98.2 °F) (Temporal)   Resp 20   Ht 1.854 m (6' 1\")   Wt 83.9 kg (184 lb 15.5 oz)   SpO2 100%   BMI 24.40 kg/m²      Constitutional: Alert in no apparent distress.  HENT: No signs of trauma, Bilateral external ears normal, Nose normal.   Eyes: Pupils are equal and reactive, Conjunctiva normal, Non-icteric.   Neck: Normal range of motion, No tenderness, Supple, No stridor.   Lymphatic: No lymphadenopathy noted.   Cardiovascular: Regular rate and rhythm, no palpable thrill  Thorax & Lungs: No respiratory distress,  No chest tenderness.  CTAB  Abdomen: Bowel sounds normal, Soft, No tenderness, No masses, No pulsatile masses. No peritoneal signs.  Skin: Warm, Dry, No erythema, No rash.   Back: No bony tenderness, No CVA tenderness.   Extremities: Intact distal pulses, No edema, No tenderness, No cyanosis.  Musculoskeletal: Good range of motion in all major joints. No tenderness to palpation or major deformities noted.   Neurologic: Pill rolling tremor in left hand, A&Ox3, per mom is baseline. Normal motor function, Normal sensory function, No focal deficits noted.   Psychiatric: Affect normal, Judgment normal, Mood normal.     DIAGNOSTIC " STUDIES / PROCEDURES    EKG Interpretation:  Interpreted by me    12 Lead EKG interpreted by me to show:  A flutter  Rate 75  Axis: Normal  Intervals: Normal  Normal T waves  Normal ST segments  My impression of this EKG: Does not indicate ischemia or arrhythmia at this time.     LABS  Labs Reviewed   CBC WITH DIFFERENTIAL - Abnormal; Notable for the following components:       Result Value    RBC 4.68 (*)     Hematocrit 41.7 (*)     MCHC 35.7 (*)     MPV 8.8 (*)     All other components within normal limits   BASIC METABOLIC PANEL - Abnormal; Notable for the following components:    Potassium 3.0 (*)     All other components within normal limits   TROPONIN - Abnormal; Notable for the following components:    Troponin T 20 (*)     All other components within normal limits   TROPONIN - Abnormal; Notable for the following components:    Troponin T 25 (*)     All other components within normal limits   ESTIMATED GFR   MAGNESIUM   COMP METABOLIC PANEL   MAGNESIUM   TROPONIN     All labs reviewed by me.    RADIOLOGY  DX-CHEST-PORTABLE (1 VIEW)   Final Result      1.  No acute cardiac or pulmonary abnormalities are identified.         The radiologist's interpretation of all radiological studies have been reviewed by me.    COURSE & MEDICAL DECISION MAKING  Pertinent Labs & Imaging studies reviewed. (See chart for details)    This is a 66 y.o. male that presents with an EKG that is mildly concerning and different from prior.  There is report of chest pain shortness of breath.  We will evaluate patient with troponins.  We will get electrolytes as well as basic labs and reassess..     1:38 AM - Patient seen and examined at bedside. Ordered EKG, DX-chest, CBC with differential, BMP, and troponin.      4:30 AM - Patient treated with Aspirin.    4:45 AM - I discussed the patient's case and the above findings with Dr. Sims (Hospitalist) who agrees to evaluate the patient for hospitalization.    The patient has a negative  chest x-ray.  The troponins are trending up.  Patient does have a flutter potentially on EKG however this might be related to the patient's underlying tremor.  Patient does have an elevated heart score.  We will need to be admitted to the hospital in guarded condition.    DISPOSITION:  Patient will be hospitalized by Dr. Sims in guarded condition.    FINAL IMPRESSION  1. Chest pain, unspecified type    2. Shortness of breath       Bernard WATERMAN (Carmina), am scribing for, and in the presence of, Rafael Velázquez M.D..    Electronically signed by: Bernard Long (Lakishaibestelle), 7/14/2022    IRafael M.D. personally performed the services described in this documentation, as scribed by Bernard Long in my presence, and it is both accurate and complete.     The note accurately reflects work and decisions made by me.  Rafael Velázquez M.D.  7/14/2022  6:30 AM

## 2022-07-14 NOTE — PROGRESS NOTES
I saw and examined the patient by the bedside today.  She he was admitted by my colleague earlier today for new onset atrial flutter.  I had a discussion with the patient regarding anticoagulation and explained in detail about risks and benefits and the patient stated that he would like to be started on anticoagulation at this point. Started on xarelto  I ordered echocardiogram for him.  Rate is currently controlled.    Need cardiology follow up.

## 2022-07-15 ENCOUNTER — APPOINTMENT (OUTPATIENT)
Dept: CARDIOLOGY | Facility: MEDICAL CENTER | Age: 67
DRG: 057 | End: 2022-07-15
Attending: INTERNAL MEDICINE
Payer: MEDICARE

## 2022-07-15 ENCOUNTER — APPOINTMENT (OUTPATIENT)
Dept: RADIOLOGY | Facility: MEDICAL CENTER | Age: 67
DRG: 057 | End: 2022-07-15
Attending: STUDENT IN AN ORGANIZED HEALTH CARE EDUCATION/TRAINING PROGRAM
Payer: MEDICARE

## 2022-07-15 ENCOUNTER — APPOINTMENT (OUTPATIENT)
Dept: RADIOLOGY | Facility: MEDICAL CENTER | Age: 67
DRG: 057 | End: 2022-07-15
Payer: MEDICARE

## 2022-07-15 ENCOUNTER — TELEPHONE (OUTPATIENT)
Dept: INTERNAL MEDICINE | Facility: OTHER | Age: 67
End: 2022-07-15

## 2022-07-15 PROBLEM — R07.89 CHEST PRESSURE: Status: ACTIVE | Noted: 2022-07-15

## 2022-07-15 PROBLEM — R79.89 POSITIVE D DIMER: Status: ACTIVE | Noted: 2022-07-15

## 2022-07-15 PROBLEM — R41.82 AMS (ALTERED MENTAL STATUS): Status: ACTIVE | Noted: 2022-07-15

## 2022-07-15 LAB
ANION GAP SERPL CALC-SCNC: 11 MMOL/L (ref 7–16)
BUN SERPL-MCNC: 15 MG/DL (ref 8–22)
CALCIUM SERPL-MCNC: 9.2 MG/DL (ref 8.5–10.5)
CHLORIDE SERPL-SCNC: 108 MMOL/L (ref 96–112)
CO2 SERPL-SCNC: 22 MMOL/L (ref 20–33)
CREAT SERPL-MCNC: 0.71 MG/DL (ref 0.5–1.4)
D DIMER PPP IA.FEU-MCNC: 10 UG/ML (FEU) (ref 0–0.5)
EKG IMPRESSION: NORMAL
FLUAV RNA SPEC QL NAA+PROBE: NEGATIVE
FLUBV RNA SPEC QL NAA+PROBE: NEGATIVE
GFR SERPLBLD CREATININE-BSD FMLA CKD-EPI: 101 ML/MIN/1.73 M 2
GLUCOSE SERPL-MCNC: 104 MG/DL (ref 65–99)
LACTATE SERPL-SCNC: 1.7 MMOL/L (ref 0.5–2)
LACTATE SERPL-SCNC: 2 MMOL/L (ref 0.5–2)
POTASSIUM SERPL-SCNC: 3.6 MMOL/L (ref 3.6–5.5)
PROCALCITONIN SERPL-MCNC: <0.05 NG/ML
RSV RNA SPEC QL NAA+PROBE: NEGATIVE
SARS-COV-2 RNA RESP QL NAA+PROBE: NOTDETECTED
SODIUM SERPL-SCNC: 141 MMOL/L (ref 135–145)
SPECIMEN SOURCE: NORMAL
TROPONIN T SERPL-MCNC: 20 NG/L (ref 6–19)

## 2022-07-15 PROCEDURE — 93010 ELECTROCARDIOGRAM REPORT: CPT | Performed by: INTERNAL MEDICINE

## 2022-07-15 PROCEDURE — 97165 OT EVAL LOW COMPLEX 30 MIN: CPT

## 2022-07-15 PROCEDURE — 700111 HCHG RX REV CODE 636 W/ 250 OVERRIDE (IP)

## 2022-07-15 PROCEDURE — 93970 EXTREMITY STUDY: CPT | Mod: 26 | Performed by: INTERNAL MEDICINE

## 2022-07-15 PROCEDURE — 93970 EXTREMITY STUDY: CPT

## 2022-07-15 PROCEDURE — 70450 CT HEAD/BRAIN W/O DYE: CPT | Mod: ME

## 2022-07-15 PROCEDURE — 700102 HCHG RX REV CODE 250 W/ 637 OVERRIDE(OP): Performed by: INTERNAL MEDICINE

## 2022-07-15 PROCEDURE — 84145 PROCALCITONIN (PCT): CPT

## 2022-07-15 PROCEDURE — G0378 HOSPITAL OBSERVATION PER HR: HCPCS

## 2022-07-15 PROCEDURE — A9270 NON-COVERED ITEM OR SERVICE: HCPCS

## 2022-07-15 PROCEDURE — 93306 TTE W/DOPPLER COMPLETE: CPT

## 2022-07-15 PROCEDURE — 83605 ASSAY OF LACTIC ACID: CPT

## 2022-07-15 PROCEDURE — 700117 HCHG RX CONTRAST REV CODE 255

## 2022-07-15 PROCEDURE — C9803 HOPD COVID-19 SPEC COLLECT: HCPCS | Performed by: STUDENT IN AN ORGANIZED HEALTH CARE EDUCATION/TRAINING PROGRAM

## 2022-07-15 PROCEDURE — 700102 HCHG RX REV CODE 250 W/ 637 OVERRIDE(OP)

## 2022-07-15 PROCEDURE — 71275 CT ANGIOGRAPHY CHEST: CPT | Mod: ME

## 2022-07-15 PROCEDURE — A9502 TC99M TETROFOSMIN: HCPCS

## 2022-07-15 PROCEDURE — A9270 NON-COVERED ITEM OR SERVICE: HCPCS | Performed by: INTERNAL MEDICINE

## 2022-07-15 PROCEDURE — 85379 FIBRIN DEGRADATION QUANT: CPT

## 2022-07-15 PROCEDURE — 0241U HCHG SARS-COV-2 COVID-19 NFCT DS RESP RNA 4 TRGT MIC: CPT

## 2022-07-15 PROCEDURE — 80048 BASIC METABOLIC PNL TOTAL CA: CPT

## 2022-07-15 PROCEDURE — 99226 PR SUBSEQUENT OBSERVATION CARE,LEVEL III: CPT | Performed by: STUDENT IN AN ORGANIZED HEALTH CARE EDUCATION/TRAINING PROGRAM

## 2022-07-15 PROCEDURE — 93306 TTE W/DOPPLER COMPLETE: CPT | Mod: 26 | Performed by: INTERNAL MEDICINE

## 2022-07-15 PROCEDURE — 36415 COLL VENOUS BLD VENIPUNCTURE: CPT

## 2022-07-15 RX ORDER — REGADENOSON 0.08 MG/ML
INJECTION, SOLUTION INTRAVENOUS
Status: COMPLETED
Start: 2022-07-15 | End: 2022-07-15

## 2022-07-15 RX ADMIN — REGADENOSON 0.4 MG: 0.08 INJECTION, SOLUTION INTRAVENOUS at 15:37

## 2022-07-15 RX ADMIN — IOHEXOL 50 ML: 350 INJECTION, SOLUTION INTRAVENOUS at 10:22

## 2022-07-15 RX ADMIN — SENNOSIDES AND DOCUSATE SODIUM 2 TABLET: 50; 8.6 TABLET ORAL at 05:18

## 2022-07-15 RX ADMIN — CARBIDOPA AND LEVODOPA 1 TABLET: 25; 100 TABLET, EXTENDED RELEASE ORAL at 20:28

## 2022-07-15 RX ADMIN — ATORVASTATIN CALCIUM 40 MG: 40 TABLET, FILM COATED ORAL at 20:28

## 2022-07-15 RX ADMIN — SENNOSIDES AND DOCUSATE SODIUM 2 TABLET: 50; 8.6 TABLET ORAL at 17:10

## 2022-07-15 RX ADMIN — CARBIDOPA AND LEVODOPA 1 TABLET: 25; 100 TABLET, EXTENDED RELEASE ORAL at 16:57

## 2022-07-15 RX ADMIN — LEVETIRACETAM 500 MG: 500 TABLET, FILM COATED ORAL at 09:41

## 2022-07-15 RX ADMIN — RIVAROXABAN 20 MG: 20 TABLET, FILM COATED ORAL at 17:10

## 2022-07-15 RX ADMIN — LEVETIRACETAM 500 MG: 500 TABLET, FILM COATED ORAL at 20:28

## 2022-07-15 RX ADMIN — CARBIDOPA AND LEVODOPA 1 TABLET: 25; 100 TABLET, EXTENDED RELEASE ORAL at 09:41

## 2022-07-15 RX ADMIN — LOSARTAN POTASSIUM 100 MG: 50 TABLET, FILM COATED ORAL at 05:17

## 2022-07-15 RX ADMIN — AMLODIPINE BESYLATE 5 MG: 5 TABLET ORAL at 05:17

## 2022-07-15 ASSESSMENT — ENCOUNTER SYMPTOMS
DIZZINESS: 0
SHORTNESS OF BREATH: 1
MYALGIAS: 0
BLURRED VISION: 0
BRUISES/BLEEDS EASILY: 0
FEVER: 0
VOMITING: 0
COUGH: 0
NAUSEA: 0

## 2022-07-15 ASSESSMENT — LIFESTYLE VARIABLES
HAVE YOU EVER FELT YOU SHOULD CUT DOWN ON YOUR DRINKING: NO
HOW MANY TIMES IN THE PAST YEAR HAVE YOU HAD 5 OR MORE DRINKS IN A DAY: 0
DOES PATIENT WANT TO STOP DRINKING: NO
ALCOHOL_USE: NO
TOTAL SCORE: 0
ON A TYPICAL DAY WHEN YOU DRINK ALCOHOL HOW MANY DRINKS DO YOU HAVE: 0
HAVE PEOPLE ANNOYED YOU BY CRITICIZING YOUR DRINKING: NO
EVER FELT BAD OR GUILTY ABOUT YOUR DRINKING: NO
TOTAL SCORE: 0
TOTAL SCORE: 0
EVER HAD A DRINK FIRST THING IN THE MORNING TO STEADY YOUR NERVES TO GET RID OF A HANGOVER: NO
CONSUMPTION TOTAL: NEGATIVE
AVERAGE NUMBER OF DAYS PER WEEK YOU HAVE A DRINK CONTAINING ALCOHOL: 0

## 2022-07-15 ASSESSMENT — COGNITIVE AND FUNCTIONAL STATUS - GENERAL
TOILETING: A LITTLE
DRESSING REGULAR UPPER BODY CLOTHING: A LITTLE
SUGGESTED CMS G CODE MODIFIER DAILY ACTIVITY: CJ
DAILY ACTIVITIY SCORE: 20
DRESSING REGULAR LOWER BODY CLOTHING: A LITTLE
HELP NEEDED FOR BATHING: A LITTLE

## 2022-07-15 ASSESSMENT — PAIN DESCRIPTION - PAIN TYPE
TYPE: ACUTE PAIN
TYPE: ACUTE PAIN

## 2022-07-15 ASSESSMENT — ACTIVITIES OF DAILY LIVING (ADL): TOILETING: INDEPENDENT

## 2022-07-15 NOTE — THERAPY
Occupational Therapy   Initial Evaluation     Patient Name: Bladimir Mitchell  Age:  66 y.o., Sex:  male  Medical Record #: [id number]  Today's Date: 7/15/2022     Precautions  Precautions: (P) Fall Risk  Comments: (P) hx of parkinsons    Assessment  Patient is 66 y.o. male admitted for SOB, elevated D-dimer, chest pressure, atrial flutter; pt (-) for PE on imaging, being ruled out for Covid. Pt normally independent with ADLs and functional mobility living in a GL apartment with mother who assist patient as needed. Pt able to complete functional mobility and ADLs with supervision, no AD needed had slight LOB but able to use stepping pattern to maintain balance. Will recommend home health at discharge for continued therapy in the home setting.       Plan    Recommend Occupational Therapy for Evaluation only.    DC Equipment Recommendations: (P) None  Discharge Recommendations: (P) Recommend home health for continued occupational therapy services     Objective       07/15/22 1401   Prior Living Situation   Prior Services None   Housing / Facility 1 Story Apartment / Condo   Steps Into Home 0   Steps In Home 0   Bathroom Set up Bathtub / Shower Combination   Equipment Owned None   Lives with - Patient's Self Care Capacity Parents   Prior Level of ADL Function   Self Feeding Independent   Grooming / Hygiene Independent   Bathing Independent   Dressing Independent   Toileting Independent   History of Falls   History of Falls Yes   Precautions   Precautions Fall Risk   Comments hx of parkinsons   Pain 0 - 10 Group   Therapist Pain Assessment Post Activity Pain Same as Prior to Activity;Nurse Notified   Cognition    Cognition / Consciousness WDL   Level of Consciousness Alert   Active ROM Upper Body   Active ROM Upper Body  WDL   Dominant Hand Right   Strength Upper Body   Upper Body Strength  WDL   Sensation Upper Body   Upper Extremity Sensation  WDL   Upper Body Muscle Tone   Upper Body Muscle Tone  WDL   Coordination  Upper Body   Coordination X   Fine Motor Coordination BUE intential tremors   Balance Assessment   Sitting Balance (Static) Fair   Sitting Balance (Dynamic) Fair   Standing Balance (Static) Fair   Standing Balance (Dynamic) Fair   Weight Shift Sitting Fair   Weight Shift Standing Fair   Comments no AD   Bed Mobility    Supine to Sit Standby Assist   Sit to Supine Standby Assist   Scooting Standby Assist   Rolling Standby Assist   ADL Assessment   Grooming Supervision;Standing   Upper Body Dressing Supervision   Lower Body Dressing Supervision   Toileting Supervision   How much help from another person does the patient currently need...   Putting on and taking off regular lower body clothing? 3   Bathing (including washing, rinsing, and drying)? 3   Toileting, which includes using a toilet, bedpan, or urinal? 3   Putting on and taking off regular upper body clothing? 3   Taking care of personal grooming such as brushing teeth? 4   Eating meals? 4   6 Clicks Daily Activity Score 20   Functional Mobility   Sit to Stand Supervised   Bed, Chair, Wheelchair Transfer Supervised   Toilet Transfers Supervised   Transfer Method Stand Step   Mobility bed mobility, bathroom mobility, back to bed   Activity Tolerance   Sitting Edge of Bed 5 min   Standing 10 min   Education Group   Education Provided Role of Occupational Therapist   Role of Occupational Therapist Patient Response Patient;Acceptance;Explanation   Problem List   Problem List None   Anticipated Discharge Equipment and Recommendations   DC Equipment Recommendations None   Discharge Recommendations Recommend home health for continued occupational therapy services   Interdisciplinary Plan of Care Collaboration   IDT Collaboration with  Nursing   Patient Position at End of Therapy In Bed;Bed Alarm On;Call Light within Reach;Tray Table within Reach;Phone within Reach   Collaboration Comments RN updated

## 2022-07-15 NOTE — ASSESSMENT & PLAN NOTE
Noted a significant elevated D-dimer.  CT angio chest negative for PE.    Initial EKG noted a flutter, repeated EKG noted in sinus rhythm.  Will rule out COVID-19 negative  DVT study negative

## 2022-07-15 NOTE — CARE PLAN
The patient is Stable - Low risk of patient condition declining or worsening    Shift Goals  Clinical Goals: OT consult, Telemonitor  Patient Goals: Rest, D/C  Family Goals: N/A Family Not Present    Progress made toward(s) clinical / shift goals:      Problem: Knowledge Deficit - Standard  Goal: Patient and family/care givers will demonstrate understanding of plan of care, disease process/condition, diagnostic tests and medications  Outcome: Progressing     Problem: Skin Integrity  Goal: Skin integrity is maintained or improved  Outcome: Progressing

## 2022-07-15 NOTE — TELEPHONE ENCOUNTER
Hi sorry to send thsi to you patient's mother was adamant about sending this to you. She would like for you to know that Patient was admitted in hospital yesterday his tremors got really bad and ended up shaking out of control to which patient had to call 911 for help.   Thank you

## 2022-07-15 NOTE — ASSESSMENT & PLAN NOTE
Troponin x 3 trended flat  EKG with nonsignificant ST changes  Echo unremarkable  Stress test noted with no reversible ischemia.Small sized infarct apical inferior, mid inferior segments.  Cardiology recommending discharge patient on aspirin and metoprolol and follow-up with outpatient.

## 2022-07-15 NOTE — DISCHARGE PLANNING
TCN following. [facility] chart review completed. Mbr admitted for New Onset Atrial Flutter not previously anticoagulated . Mbr currently receiving Oral Xarelto 20 mg with PM Meal.  Mbr was noted  By Hospital Medicine APRN on 7/15/2022  to have tachypnea, SOB and chest pressure-Troponin EKG ,and D-dimer were ordered    Awaiting final OT recommendations.  Appreciate PT recommendation of  Home Health level of care.    Awaiting medical clearance as of 7/15/2022.          Previously completed:  -Requested and Obtained MD order for PT OT eval  _Obtained Choice forms: SNF, HH  _[facility] [place] ( [place])

## 2022-07-15 NOTE — THERAPY
Missed Therapy     Patient Name: Bladimir Mitchell  Age:  66 y.o., Sex:  male  Medical Record #: [id number]  Today's Date: 7/15/2022    Pt off floor for imaging to rule out PE, will follow up for OT evaluation as medically appropriate.      Jose Alfredo Perera OTD, OTR/L

## 2022-07-15 NOTE — PROGRESS NOTES
Assumed care from Jennifer HUTSON  Assessment complete. Plan of care gone over with patient and all concerns were gone over with patient. Patient was resting in bed comfortably and ate breakfast. Patient was A&O x 4. Safety precautions in place. Patient had no concerns at this time and educated on how to use the call light. Will continue to do hourly monitoring.   0847: patient signed consent and educated on CTA procedure. Patient picked up and transported by the transport team.

## 2022-07-15 NOTE — CARE PLAN
Problem: Knowledge Deficit - Standard  Goal: Patient and family/care givers will demonstrate understanding of plan of care, disease process/condition, diagnostic tests and medications  Outcome: Progressing     Problem: Skin Integrity  Goal: Skin integrity is maintained or improved  Outcome: Progressing     The patient is resting comfortably. Skin integrity remains good and skin protection is in place, draw sheet under patient, extra pillows in place to elevate bony prominences.     Shift Goals  Clinical Goals: PT/OT, Ultrasound, rest  Patient Goals: rest, go home  Family Goals: N/A Family Not Present    Progress made toward(s) clinical / shift goals:  pillows and creams protecting skin. Patient being educated on tests and medications being completed.     Patient is not progressing towards the following goals:

## 2022-07-15 NOTE — PROGRESS NOTES
"0640 addendum: D-dimer 10. STAT CT-CTA chest pulmonary artery ordered to evaluate for PE.      Noc Cross Coverage Progress Note:      APRN notified of ongoing tachypnea (RR 20-30), shortness of breath, and chest pressure this evening. Troponin, EKG, and lactate was ordered by previous cross cover APRN prior to my shift. Lactate is 2.0, troponin downtrending from 25 to 20. D-dimer was added due to ongoing tachypnea and new onset a-flutter not previously anticoagulated.     /75   Pulse 82   Temp 37 °C (98.6 °F) (Temporal)   Resp 20   Ht 1.854 m (6' 1\")   Wt 83.5 kg (184 lb 1.4 oz)   SpO2 93%        CRUZ Malone   Noc Hospitalist         "

## 2022-07-15 NOTE — PROGRESS NOTES
Hospital Medicine Daily Progress Note    Date of Service  7/15/2022    Chief Complaint  Bladimir Mitchell is a 66 y.o. male admitted 7/14/2022 with shortness of breath    Hospital Course    Patient 66-year-old male with past medical history of Parkinson's disease, hypokalemia, hypertension who presented 7/14 for concerns with shortness of breath.       In ED: Temperature 36.8 °C, blood pressure 177/116, pulse 98, respiratory 20, oxygen 100% on room air  Labs: WBC 5.7, sodium 141, potassium 3.0, troponin 20, 25,   EKG revealing atrial flutter   Admitted for further work up    Interval Problem Update  7/15/2022  Vitals remained stable.  Patient was AOx3 morning around.  He have intermittent confusion.  CT head ordered for further evaluation.  Neurochecks every 4 hours  Noted a significant elevated D-dimer.  CT angio chest negative for PE.  Pending DVT study  Initial EKG noted a flutter, repeated EKG noted in sinus rhythm.  Will rule out COVID-19 pneumonia given significant D-dimer and shortness of breath  Discussed with cardiology Dr. Gill , plan to get nuclear study to rule out underlying CAD    Need home O2 evaluation  PT/OT evaluation    I have discussed this patient's plan of care and discharge plan at IDT rounds today with Case Management, Nursing, Nursing leadership, and other members of the IDT team.    Consultants/Specialty  cardiology    Code Status  Full Code    Disposition  Patient is not medically cleared for discharge.   Anticipate discharge to to home with close outpatient follow-up.  I have placed the appropriate orders for post-discharge needs.    Review of Systems  Review of Systems   Constitutional: Negative for fever.   HENT: Negative for hearing loss.    Eyes: Negative for blurred vision.   Respiratory: Positive for shortness of breath. Negative for cough.    Cardiovascular: Negative for chest pain.   Gastrointestinal: Negative for nausea and vomiting.   Genitourinary: Negative for  dysuria.   Musculoskeletal: Negative for myalgias.   Skin: Negative for rash.   Neurological: Negative for dizziness.   Endo/Heme/Allergies: Does not bruise/bleed easily.        Physical Exam  Temp:  [36.5 °C (97.7 °F)-37 °C (98.6 °F)] 36.5 °C (97.7 °F)  Pulse:  [72-92] 82  Resp:  [18-32] 20  BP: (111-139)/(65-83) 111/65  SpO2:  [93 %-99 %] 93 %    Physical Exam  Constitutional:       General: He is not in acute distress.  HENT:      Head: Normocephalic and atraumatic.      Right Ear: Tympanic membrane normal.      Left Ear: Tympanic membrane normal.      Nose: Nose normal.      Mouth/Throat:      Mouth: Mucous membranes are moist.   Eyes:      Extraocular Movements: Extraocular movements intact.      Pupils: Pupils are equal, round, and reactive to light.   Cardiovascular:      Rate and Rhythm: Normal rate and regular rhythm.      Pulses: Normal pulses.   Pulmonary:      Effort: Pulmonary effort is normal. No respiratory distress.   Abdominal:      General: Abdomen is flat. There is no distension.   Musculoskeletal:      Cervical back: Normal range of motion.      Right lower leg: No edema.      Left lower leg: No edema.   Skin:     General: Skin is warm.   Neurological:      General: No focal deficit present.      Mental Status: He is alert and oriented to person, place, and time. Mental status is at baseline.   Psychiatric:         Mood and Affect: Mood normal.         Fluids    Intake/Output Summary (Last 24 hours) at 7/15/2022 1246  Last data filed at 7/14/2022 2200  Gross per 24 hour   Intake --   Output 675 ml   Net -675 ml       Laboratory  Recent Labs     07/14/22  0156   WBC 5.7   RBC 4.68*   HEMOGLOBIN 14.9   HEMATOCRIT 41.7*   MCV 89.1   MCH 31.8   MCHC 35.7*   RDW 41.1   PLATELETCT 235   MPV 8.8*     Recent Labs     07/14/22  0156 07/14/22  0825   SODIUM 141 141   POTASSIUM 3.0* 3.3*   CHLORIDE 107 105   CO2 23 24   GLUCOSE 93 104*   BUN 11 8   CREATININE 0.74 0.68   CALCIUM 9.1 8.6                    Imaging  CT-CTA CHEST PULMONARY ARTERY W/ RECONS   Final Result      1.  No evidence of pulmonary embolism to the level of the segmental arteries.   2.  No acute cardiopulmonary abnormality.   3.  Atherosclerotic changes. Mild dilation of the ascending aorta, measuring 4.1 cm.   4.  Coronary artery atherosclerotic disease.   5.  Two 4 mm right upper lobe pulmonary nodules. Follow-up recommendations below.      Low Risk: No routine follow-up      High Risk: Optional CT at 12 months      Comments: Use most suspicious nodule as guide to management. Follow-up intervals may vary according to size and risk.      Low Risk - Minimal or absent history of smoking and of other known risk factors.      High Risk - History of smoking or of other known risk factors.      Note: These recommendations do not apply to lung cancer screening, patients with immunosuppression, or patients with known primary cancer.      Fleischner Society 2017 Guidelines for Management of Incidentally Detected Pulmonary Nodules in Adults      EC-ECHOCARDIOGRAM COMPLETE W/O CONT   Final Result      DX-CHEST-PORTABLE (1 VIEW)   Final Result      1.  No acute cardiac or pulmonary abnormalities are identified.      US-EXTREMITY VENOUS LOWER BILAT    (Results Pending)   NM-CARDIAC STRESS TEST    (Results Pending)        Assessment/Plan  Chest pressure  Assessment & Plan  Troponin x 3 trended flat  EKG with nonsignificant ST changes  Echo unremarkable  We will get stress test      Positive D dimer  Assessment & Plan  Noted a significant elevated D-dimer.  CT angio chest negative for PE.  Pending DVT study  Initial EKG noted a flutter, repeated EKG noted in sinus rhythm.  Will rule out COVID-19 pneumonia given significant D-dimer and shortness of breath    Dyspnea- (present on admission)  Assessment & Plan  Nonfocal dyspnea, chronic, intermittent, worse on exertion  - Currently on room air  - rule out covid     Atrial flutter (HCC)- (present on  admission)  Assessment & Plan  EKG demonstrating 4:1 conduction, no prior comparison.  Not currently on anticoagulation  - Currently rate controlled, not on AV blockers  - Will need risk versus benefit discussion of anticoagulation  - [id number] score of at least 2     Hypokalemia- (present on admission)  Assessment & Plan  Monitor and replace as needed, appears chronic    Parkinson's disease (HCC)- (present on admission)  Assessment & Plan  Continue home Sinemet    Advanced care planning/counseling discussion  Assessment & Plan  I spent 17 minutes at bedside with patient discussing work-up, results, diagnosis, prognosis.  CODE STATUS discussed with patient and wants to be Full code.    Essential hypertension- (present on admission)  Assessment & Plan  Blood pressure elevated on admission  - Continue home losartan  - If blood pressure still elevated can consider IV as needed       VTE prophylaxis: SCDs/TEDs and therapeutic anticoagulation with xarleto     I have performed a physical exam and reviewed and updated ROS and Plan today (7/15/2022). In review of yesterday's note (7/14/2022), there are no changes except as documented above.

## 2022-07-15 NOTE — PROGRESS NOTES
4 Eyes Skin Assessment Completed by Vivek, RN and Renetta, RN.    Head WDL  Ears WDL  Nose WDL  Mouth WDL  Neck WDL  Breast/Chest WDL  Shoulder Blades WDL  Spine WDL  (R) Arm/Elbow/Hand WDL  (L) Arm/Elbow/Hand WDL  Abdomen WDL  Groin WDL  Scrotum/Coccyx/Buttocks WDL  (R) Leg WDL  (L) Leg WDL  (R) Heel/Foot/Toe WDL  (L) Heel/Foot/Toe WDL          Devices In Places Tele Box and Pulse Ox      Interventions In Place Pressure Redistribution Mattress    Possible Skin Injury No    Pictures Uploaded Into Epic N/A  Wound Consult Placed N/A  RN Wound Prevention Protocol Ordered No

## 2022-07-16 LAB
APPEARANCE UR: CLEAR
BACTERIA #/AREA URNS HPF: NEGATIVE /HPF
BILIRUB UR QL STRIP.AUTO: NEGATIVE
COLOR UR: ABNORMAL
EPI CELLS #/AREA URNS HPF: NEGATIVE /HPF
GLUCOSE UR STRIP.AUTO-MCNC: NEGATIVE MG/DL
HYALINE CASTS #/AREA URNS LPF: ABNORMAL /LPF
KETONES UR STRIP.AUTO-MCNC: ABNORMAL MG/DL
LEUKOCYTE ESTERASE UR QL STRIP.AUTO: ABNORMAL
MICRO URNS: ABNORMAL
NITRITE UR QL STRIP.AUTO: NEGATIVE
PH UR STRIP.AUTO: 6 [PH] (ref 5–8)
PROT UR QL STRIP: NEGATIVE MG/DL
RBC # URNS HPF: ABNORMAL /HPF
RBC UR QL AUTO: NEGATIVE
SP GR UR STRIP.AUTO: 1.03
UROBILINOGEN UR STRIP.AUTO-MCNC: 1 MG/DL
WBC #/AREA URNS HPF: ABNORMAL /HPF

## 2022-07-16 PROCEDURE — 95819 EEG AWAKE AND ASLEEP: CPT | Performed by: STUDENT IN AN ORGANIZED HEALTH CARE EDUCATION/TRAINING PROGRAM

## 2022-07-16 PROCEDURE — 99226 PR SUBSEQUENT OBSERVATION CARE,LEVEL III: CPT | Performed by: STUDENT IN AN ORGANIZED HEALTH CARE EDUCATION/TRAINING PROGRAM

## 2022-07-16 PROCEDURE — 700102 HCHG RX REV CODE 250 W/ 637 OVERRIDE(OP)

## 2022-07-16 PROCEDURE — 700102 HCHG RX REV CODE 250 W/ 637 OVERRIDE(OP): Performed by: NURSE PRACTITIONER

## 2022-07-16 PROCEDURE — 700102 HCHG RX REV CODE 250 W/ 637 OVERRIDE(OP): Performed by: INTERNAL MEDICINE

## 2022-07-16 PROCEDURE — G0378 HOSPITAL OBSERVATION PER HR: HCPCS

## 2022-07-16 PROCEDURE — A9270 NON-COVERED ITEM OR SERVICE: HCPCS | Performed by: NURSE PRACTITIONER

## 2022-07-16 PROCEDURE — 99204 OFFICE O/P NEW MOD 45 MIN: CPT | Mod: GC | Performed by: INTERNAL MEDICINE

## 2022-07-16 PROCEDURE — A9270 NON-COVERED ITEM OR SERVICE: HCPCS

## 2022-07-16 PROCEDURE — 4A10X4Z MONITORING OF CENTRAL NERVOUS ELECTRICAL ACTIVITY, EXTERNAL APPROACH: ICD-10-PCS | Performed by: STUDENT IN AN ORGANIZED HEALTH CARE EDUCATION/TRAINING PROGRAM

## 2022-07-16 PROCEDURE — A9270 NON-COVERED ITEM OR SERVICE: HCPCS | Performed by: INTERNAL MEDICINE

## 2022-07-16 PROCEDURE — 99214 OFFICE O/P EST MOD 30 MIN: CPT | Mod: 25,FS | Performed by: NURSE PRACTITIONER

## 2022-07-16 PROCEDURE — 95819 EEG AWAKE AND ASLEEP: CPT | Mod: 26 | Performed by: STUDENT IN AN ORGANIZED HEALTH CARE EDUCATION/TRAINING PROGRAM

## 2022-07-16 PROCEDURE — 81001 URINALYSIS AUTO W/SCOPE: CPT

## 2022-07-16 RX ORDER — CARBIDOPA AND LEVODOPA 25; 100 MG/1; MG/1
1 TABLET, EXTENDED RELEASE ORAL 4 TIMES DAILY
Status: DISCONTINUED | OUTPATIENT
Start: 2022-07-16 | End: 2022-07-20 | Stop reason: HOSPADM

## 2022-07-16 RX ADMIN — LOSARTAN POTASSIUM 100 MG: 50 TABLET, FILM COATED ORAL at 05:38

## 2022-07-16 RX ADMIN — CARBIDOPA AND LEVODOPA 1 TABLET: 25; 100 TABLET, EXTENDED RELEASE ORAL at 14:08

## 2022-07-16 RX ADMIN — METOPROLOL TARTRATE 25 MG: 25 TABLET, FILM COATED ORAL at 17:06

## 2022-07-16 RX ADMIN — LEVETIRACETAM 500 MG: 500 TABLET, FILM COATED ORAL at 20:21

## 2022-07-16 RX ADMIN — ATORVASTATIN CALCIUM 40 MG: 40 TABLET, FILM COATED ORAL at 20:21

## 2022-07-16 RX ADMIN — SENNOSIDES AND DOCUSATE SODIUM 2 TABLET: 50; 8.6 TABLET ORAL at 17:10

## 2022-07-16 RX ADMIN — CARBIDOPA AND LEVODOPA 1 TABLET: 25; 100 TABLET, EXTENDED RELEASE ORAL at 20:21

## 2022-07-16 RX ADMIN — AMLODIPINE BESYLATE 5 MG: 5 TABLET ORAL at 05:38

## 2022-07-16 RX ADMIN — METOPROLOL TARTRATE 25 MG: 25 TABLET, FILM COATED ORAL at 10:48

## 2022-07-16 RX ADMIN — ASPIRIN 81 MG: 81 TABLET, COATED ORAL at 10:48

## 2022-07-16 RX ADMIN — SENNOSIDES AND DOCUSATE SODIUM 2 TABLET: 50; 8.6 TABLET ORAL at 05:38

## 2022-07-16 RX ADMIN — CARBIDOPA AND LEVODOPA 1 TABLET: 25; 100 TABLET, EXTENDED RELEASE ORAL at 17:09

## 2022-07-16 RX ADMIN — LEVETIRACETAM 500 MG: 500 TABLET, FILM COATED ORAL at 10:48

## 2022-07-16 ASSESSMENT — ENCOUNTER SYMPTOMS
CHILLS: 0
HOARSE VOICE: 0
SEIZURES: 0
DIZZINESS: 0
CONSTIPATION: 0
DIAPHORESIS: 0
FEVER: 0
SYNCOPE: 0
COUGH: 0
DEPRESSION: 0
LIGHT-HEADEDNESS: 0
IRREGULAR HEARTBEAT: 0
SHORTNESS OF BREATH: 1
PALPITATIONS: 0
INSOMNIA: 0
HEMATOCHEZIA: 0
WEIGHT GAIN: 0
VOMITING: 0
BLURRED VISION: 0
HALLUCINATIONS: 0
TREMORS: 1
ORTHOPNEA: 0
HEARTBURN: 0
FOCAL WEAKNESS: 0
NUMBNESS: 0
DIARRHEA: 0
PND: 0
BRUISES/BLEEDS EASILY: 0
BACK PAIN: 0
HEADACHES: 0
ABDOMINAL PAIN: 0
NERVOUS/ANXIOUS: 0
ALTERED MENTAL STATUS: 0
SORE THROAT: 0
SPUTUM PRODUCTION: 0
WEIGHT LOSS: 0
NAUSEA: 0
WEAKNESS: 1
WHEEZING: 0
NEAR-SYNCOPE: 0
PARESTHESIAS: 0
NECK PAIN: 0
EXCESSIVE DAYTIME SLEEPINESS: 0
VERTIGO: 0
MEMORY LOSS: 0
DYSPNEA ON EXERTION: 1

## 2022-07-16 ASSESSMENT — PAIN DESCRIPTION - PAIN TYPE
TYPE: ACUTE PAIN
TYPE: ACUTE PAIN

## 2022-07-16 ASSESSMENT — LIFESTYLE VARIABLES: SUBSTANCE_ABUSE: 0

## 2022-07-16 NOTE — DISCHARGE PLANNING
HTH/SCP TCN chart review completed. Collaborated with ASHLIE Roa. Patient seen at bedside. Pt appears somewhat confused and is having difficulty when discussing dc plan. Note initial plan for dc to home today with HH services and pt had been seen by PT and OT with recs for HH post dc. However, concerns from MD/team this AM re: pt status and concerns from team that pt may now need SNF placement (note pt has both HH and SNF choice in media as needed). Note at time of writing this note, pt is now awaiting EEG and cardiac cath on Monday per review. Discussed with PT who is also aware that will likely need reassessment given change in status though will likely be waiting for final POC/workup given changes in status. TCN will continue to follow and collaborate with discharge planning team as additional post acute needs arise. Thank you.    Previously completed:  - Orders received for: PT and OT (see above)  - Choice forms: HH, SNF (scanned in media 7/14)  - [facility] introduced (Y), referral (sent).

## 2022-07-16 NOTE — FACE TO FACE
Face to Face Supporting Documentation - Home Health    The encounter with this patient was in whole or in part the primary reason for home health admission.    Date of encounter:   Patient:                    MRN:                       YOB: 2021  Bladimir Mitchell  [id number]  1955     Home health to see patient for:  Skilled Nursing care for assessment, interventions & education, Physical Therapy evaluation and treatment and Occupational therapy evaluation and treatment    Skilled need for:  Comment: parkinson     Skilled nursing interventions to include:  Comment: PT/OT    Homebound status evidenced by:  Need the aid of supportive devices such as crutches, canes, wheelchairs or walkers. Leaving home requires a considerable and taxing effort. There is a normal inability to leave the home.    Community Physician to provide follow up care: Lety Belcher M.D.     Optional Interventions? No      I certify the face to face encounter for this home health care referral meets the CMS requirements and the encounter/clinical assessment with the patient was, in whole, or in part, for the medical condition(s) listed above, which is the primary reason for home health care. Based on my clinical findings: the service(s) are medically necessary, support the need for home health care, and the homebound criteria are met.  I certify that this patient has had a face to face encounter by the clinical nurse specialist working collaboratively with me.  Sina Souza M.D. - NPI: [id number]

## 2022-07-16 NOTE — PROCEDURES
INPATIENT ROUTINE VIDEO ELECTROENCEPHALOGRAM REPORT      Referring provider:   Dr. Souza    DOS: 07/16/22 (0 hours and 26 minutes of total recording time).     INDICATION:  Bladimir Mitchell 66 y.o. male presenting with altered mental status    CURRENT ANTIEPILEPTIC AND/OR SEDATING REGIMEN:   Keppra    TECHNIQUE: Routine VEEG was set up by a Neurodiagnostic technologist who performed education to the patient and staff. A minimum of 23 electrodes and 23 channel recording was setup and performed by Neurodiagnostic technologist, in accordance with the international 10-20 system. The study was reviewed in bipolar and referential montages. The recording examined the patient in the  awake, drowsy, and sleep state(s).     DESCRIPTION OF THE RECORD:  During maximal wakefulness, the background was continuous and showed a 7-8 Hz posterior dominant rhythm, with a mixture of theta and delta activity.  Reactivity and state changes were present .  During drowsiness, theta/delta frequencies were seen.     EEG Sleep: Occasional N2 sleep transients in the form of rudimentary sleep spindles fragments and vertex waves were seen in the leads over the central regions.     ACTIVATION PROCEDURES:   Intermittent Photic stimulation was performed in a stepwise fashion from 1 to 30 Hz and did not elicited any abnormalities on EEG.     ICTAL AND INTERICTAL FINDINGS:   No focal or generalized epileptiform activity noted.     No regional slowing was seen during this routine study.      No definite electrographic or electroclinical seizures.     EKG: Sampling of the EKG recording did not demonstrate any abnormalities      EVENTS:  None    INTERPRETATION:   Abnormal video EEG recording in the awake, drowsy, and sleep state(s):  - Mild to moderate background slowing suggestive of a nonspecific encephalopathy   - No persistent focal asymmetries seen.  - No epileptiform discharges or other epileptiform phenomena seen   - No seizures. Clinical  correlation is recommended.      Note: This EEG does not rule out epilepsy.  If the clinical suspicion remains high for seizures, a prolonged recording to capture clinical or subclinical events may be helpful.        Delfin Jaeger MD  Department of Neurology at [facility]  General Neurologist and Epileptologist  Director of [facility]'s Level III Comprehensive Epilepsy Program  Professor of Clinical Neurology, [facility].   Phone: [phone]  Fax: [phone]  E-mail: [email or web address]

## 2022-07-16 NOTE — PROGRESS NOTES
Report received from night shift RN. Assume care. Pt. AAOx3 pt is bed,  Assessment completed. VSS. Denies pain, able to wiggle toes and dorsi/plantar flex feet, good CMS and pulses to lacho LE, denies numbness and tingling.  Pt was update for the care for the day. White board updated, All question answered. Pt has call light within reach,  bed is in the lowest position. Pt has no other needs at this time.   Dr Chamorro at bedside Pt NPO to go Cath Lab today

## 2022-07-16 NOTE — PROGRESS NOTES
Hospital Medicine Daily Progress Note    Date of Service  7/16/2022    Chief Complaint  Bladimir Mitchell is a 66 y.o. male admitted 7/14/2022 with shortness of breath     Hospital Course     Patient 66-year-old male with past medical history of Parkinson's disease, hypokalemia, hypertension who presented 7/14 for concerns with shortness of breath.       In ED: Temperature 36.8 °C, blood pressure 177/116, pulse 98, respiratory 20, oxygen 100% on room air  Labs: WBC 5.7, sodium 141, potassium 3.0, troponin 20, 25,   EKG revealing atrial flutter   Admitted for further work up    Interval Problem Update  7/15/2022  Vitals remained stable.  Patient was AOx3 morning around.  He have intermittent confusion.  CT head ordered for further evaluation.  Neurochecks every 4 hours  Noted a significant elevated D-dimer.  CT angio chest negative for PE.  Pending DVT study  Initial EKG noted a flutter, repeated EKG noted in sinus rhythm.  Will rule out COVID-19 pneumonia given significant D-dimer and shortness of breath  Discussed with cardiology Dr. Gill , plan to get nuclear study to rule out underlying CAD     Need home O2 evaluation  PT/OT evaluation     7/16/2022  Vitals remained stable.  Worsening tremor.  CT is unremarkable.  DVT study negative COVID PCR negative.  PT/OT recommending home with home health.  Stress test noted with no reversible ischemia.Small sized infarct apical inferior, mid inferior segments.  Cardiology recommending discharge patient on aspirin and metoprolol and follow-up with outpatient.    EEG ordered.  Neurology was consulted for worsening tremor.    As per patient's mother she went to be able to take care of him.  Likely need  follow-up to assist with discharge plan      I have discussed this patient's plan of care and discharge plan at IDT rounds today with Case Management, Nursing, Nursing leadership, and other members of the IDT  team.     Consultants/Specialty  cardiology     Code Status  Full Code     Disposition  Patient is not medically cleared for discharge.   Anticipate discharge to to home with close outpatient follow-up.  I have placed the appropriate orders for post-discharge needs.     Review of Systems  Review of Systems   Constitutional: Negative for fever.   HENT: Negative for hearing loss.    Eyes: Negative for blurred vision.   Respiratory: Positive for shortness of breath. Negative for cough.    Cardiovascular: Negative for chest pain.   Gastrointestinal: Negative for nausea and vomiting.   Genitourinary: Negative for dysuria.   Musculoskeletal: Negative for myalgias.   Skin: Negative for rash.   Neurological: Negative for dizziness.   Endo/Heme/Allergies: Does not bruise/bleed easily.         Physical Exam  Temp:  [36.5 °C (97.7 °F)-37 °C (98.6 °F)] 36.5 °C (97.7 °F)  Pulse:  [72-92] 82  Resp:  [18-32] 20  BP: (111-139)/(65-83) 111/65  SpO2:  [93 %-99 %] 93 %     Physical Exam  Constitutional:       General: He is not in acute distress.  HENT:      Head: Normocephalic and atraumatic.      Right Ear: Tympanic membrane normal.      Left Ear: Tympanic membrane normal.      Nose: Nose normal.      Mouth/Throat:      Mouth: Mucous membranes are moist.   Eyes:      Extraocular Movements: Extraocular movements intact.      Pupils: Pupils are equal, round, and reactive to light.   Cardiovascular:      Rate and Rhythm: Normal rate and regular rhythm.      Pulses: Normal pulses.   Pulmonary:      Effort: Pulmonary effort is normal. No respiratory distress.   Abdominal:      General: Abdomen is flat. There is no distension.   Musculoskeletal:      Cervical back: Normal range of motion.      Right lower leg: No edema.      Left lower leg: No edema.   Skin:     General: Skin is warm.   Neurological:      General: No focal deficit present.  Tremor noted      Mental Status: He is alert and oriented to person, place, and time. Mental status  is at baseline.   Psychiatric:         Mood and Affect: Mood normal.   Fluids  No intake or output data in the 24 hours ending 07/16/22 0930    Laboratory  Recent Labs     07/14/22  0156   WBC 5.7   RBC 4.68*   HEMOGLOBIN 14.9   HEMATOCRIT 41.7*   MCV 89.1   MCH 31.8   MCHC 35.7*   RDW 41.1   PLATELETCT 235   MPV 8.8*     Recent Labs     07/14/22  0156 07/14/22  0825 07/15/22  1914   SODIUM 141 141 141   POTASSIUM 3.0* 3.3* 3.6   CHLORIDE 107 105 108   CO2 23 24 22   GLUCOSE 93 104* 104*   BUN 11 8 15   CREATININE 0.74 0.68 0.71   CALCIUM 9.1 8.6 9.2                   Imaging  CT-HEAD W/O   Final Result      1.  Cerebral atrophy.      2.  Otherwise, Head CT without contrast within normal limits. No evidence of acute cerebral infarction, hemorrhage or mass lesion.         NM-CARDIAC STRESS TEST   Final Result      US-EXTREMITY VENOUS LOWER BILAT   Final Result      CT-CTA CHEST PULMONARY ARTERY W/ RECONS   Final Result      1.  No evidence of pulmonary embolism to the level of the segmental arteries.   2.  No acute cardiopulmonary abnormality.   3.  Atherosclerotic changes. Mild dilation of the ascending aorta, measuring 4.1 cm.   4.  Coronary artery atherosclerotic disease.   5.  Two 4 mm right upper lobe pulmonary nodules. Follow-up recommendations below.      Low Risk: No routine follow-up      High Risk: Optional CT at 12 months      Comments: Use most suspicious nodule as guide to management. Follow-up intervals may vary according to size and risk.      Low Risk - Minimal or absent history of smoking and of other known risk factors.      High Risk - History of smoking or of other known risk factors.      Note: These recommendations do not apply to lung cancer screening, patients with immunosuppression, or patients with known primary cancer.      Fleischner Society 2017 Guidelines for Management of Incidentally Detected Pulmonary Nodules in Adults      EC-ECHOCARDIOGRAM COMPLETE W/O CONT   Final Result       DX-CHEST-PORTABLE (1 VIEW)   Final Result      1.  No acute cardiac or pulmonary abnormalities are identified.           Assessment/Plan  AMS (altered mental status)  Assessment & Plan  AMS, likely delirium   Get CT head   Get UA    Chest pressure  Assessment & Plan  Troponin x 3 trended flat  EKG with nonsignificant ST changes  Echo unremarkable  Stress test noted with no reversible ischemia.Small sized infarct apical inferior, mid inferior segments.  Cardiology recommending discharge patient on aspirin and metoprolol and follow-up with outpatient.        Positive D dimer  Assessment & Plan  Noted a significant elevated D-dimer.  CT angio chest negative for PE.    Initial EKG noted a flutter, repeated EKG noted in sinus rhythm.  Will rule out COVID-19 negative  DVT study negative    Dyspnea- (present on admission)  Assessment & Plan  Nonfocal dyspnea, chronic, intermittent, worse on exertion  - Currently on room air  - rule out covid     Atrial flutter (HCC)- (present on admission)  Assessment & Plan  Confirmed with cardiology no further.  Xarelto discontinued    Hypokalemia- (present on admission)  Assessment & Plan  Monitor and replace as needed, appears chronic    Parkinson's disease (HCC)- (present on admission)  Assessment & Plan  Continue home Sinemet    Advanced care planning/counseling discussion  Assessment & Plan  I spent 17 minutes at bedside with patient discussing work-up, results, diagnosis, prognosis.  CODE STATUS discussed with patient and wants to be Full code.    Essential hypertension- (present on admission)  Assessment & Plan  Blood pressure elevated on admission  - Continue home losartan  - If blood pressure still elevated can consider IV as needed       VTE prophylaxis: SCDs/TEDs  Therapeutic Xarelto  I have performed a physical exam and reviewed and updated ROS and Plan today (7/16/2022). In review of yesterday's note (7/15/2022), there are no changes except as documented above.

## 2022-07-16 NOTE — CARE PLAN
The patient is Stable - Low risk of patient condition declining or worsening    Shift Goals  Clinical Goals: Possible placement  Patient Goals: Rest, D/C  Family Goals: N/A Family Not Present    Progress made toward(s) clinical / shift goals:      Problem: Knowledge Deficit - Standard  Goal: Patient and family/care givers will demonstrate understanding of plan of care, disease process/condition, diagnostic tests and medications  Outcome: Progressing     Problem: Skin Integrity  Goal: Skin integrity is maintained or improved  Outcome: Progressing

## 2022-07-16 NOTE — CARE PLAN
Problem: Skin Integrity  Goal: Skin integrity is maintained or improved  Outcome: Progressing     Problem: Fall Risk  Goal: Patient will remain free from falls  Outcome: Progressing   The patient is Stable - Low risk of patient condition declining or worsening    Shift Goals  Clinical Goals: to cath lab today and collect urine sample by 1400  Patient Goals: rest  Family Goals: N/A Family Not Present    Progress made toward(s) clinical / shift goals:  Yes     Patient is not progressing towards the following goals:

## 2022-07-16 NOTE — DISCHARGE PLANNING
ATTN: Case Management  RE: Referral for Home Health    As of 7/16/2022, we have accepted the Home Health referral for the patient listed above.    A [facility] clinician will be out to see the patient within 48 hours. If you have any questions or concerns regarding the patient's transition to Home Health, please do not hesitate to contact us at [phone].      We look forward to collaborating with you,  [facility] Health Team

## 2022-07-16 NOTE — CONSULTS
"Neurology Initial Consult H&P  [facility]rology    Referring Physician: Sina Souza M.D.    Chief Complaint   Patient presents with   • ALOC     Hx Parkinsons, mother reports \"hard breathing and shaking\". Pt mother states he could not answer questions appropriately today, EMS states same.       History of Present Illness: Bladimir Mitchell is a 66 y.o. male with history of abnormal LFTs, genital herpes, gynecomastia, hyperlipidemia, hypertension, chronic neck pain, rheumatoid arthritis, and Parkinson's disease on Sinemet who presented to [facility] on 7/14/22 for shortness of breath and altered mental status. Patient states that his shortness of breath is intermittent and chronic, worse with exertion. Initial EKG revealed atrial flutter with repeat EKG showing SR. During hospitalization, the patient has had waxing and waning mentation, and his resting tremors have reportedly worsened, precluding him from undergoing a cardiac angiogram per hospitalist. Neurology was consulted for today for his altered mental status and Parkinson's medication management.     Currently, patient is sitting up in bed, awake, alert, following commands, and oriented to self, place, situation, year, but disoriented to month. Persistent resting tremor with pill-rolling noted with intermittent increased intensity. He reports his tremor, confusion, memory loss, shortness of breath, and anxiety were worse at admission and somewhat better today. Denies headache, vision changes, facial or focal weakness, speech/language changes, numbness, tingling, LOC, and falls/trauma. He denies having hallucinations and reports sleep has improved of late.    Per chart review, patient was seen in the neurology clinic by Dr. Kuldeep Phipps, Movement Disorders specialist, on 6/24/22. At that time, the patient was complaining of increased confusion, episodic memory loss with periods of dyspnea and anxiety, and spells of unresponsiveness. Ambulatory " EEG showed diffuse slowing c/w encephalopathy and left bitemporal slowing, but no seizures. Episodes were attributed to syncope, thus his carbidopa/levodopa 25/100 mg was decreased to TID to avoid orthostatic hypotension.     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   • RA (rheumatoid arthritis) (HCC)    • Sleep apnea 3/27/2017       Past surgical history:   Past Surgical History:   Procedure Laterality Date   • ABDOMINAL EXPLORATION     • CHOLECYSTECTOMY         Family history:   Family History   Problem Relation Age of Onset   • No Known Problems Mother    • No Known Problems Father    • No Known Problems Maternal Grandmother    • No Known Problems Maternal Grandfather    • No Known Problems Paternal Grandmother    • No Known Problems Paternal Grandfather        Social history:   Social History     Socioeconomic History   • Marital status: Single     Spouse name: Not on file   • Number of children: Not on file   • Years of education: Not on file   • Highest education level: Not on file   Occupational History   • Not on file   Tobacco Use   • Smoking status: Former Smoker     Packs/day: 2.00     Years: 25.00     Pack years: 50.00     Types: Cigarettes     Quit date:      Years since quittin.5   • Smokeless tobacco: Never Used   Vaping Use   • Vaping Use: Never used   Substance and Sexual Activity   • Alcohol use: Not Currently     Alcohol/week: 0.6 - 1.2 oz     Types: 1 - 2 Shots of liquor per week     Comment: min   • Drug use: No   • Sexual activity: Yes   Other Topics Concern   • Not on file   Social History Narrative   • Not on file     Social Determinants of Health     Financial Resource  Strain: Not on file   Food Insecurity: Not on file   Transportation Needs: Not on file   Physical Activity: Not on file   Stress: Not on file   Social Connections: Not on file   Intimate Partner Violence: Not on file   Housing Stability: Not on file       Allergies:  No Known Allergies    Medications:    Current Facility-Administered Medications:   •  aspirin EC (ECOTRIN) tablet 81 mg, 81 mg, Oral, DAILY, Melia Chamorro M.D., 81 mg at 07/16/22 1048  •  metoprolol tartrate (LOPRESSOR) tablet 25 mg, 25 mg, Oral, TWICE DAILY, Melia Chamorro M.D., 25 mg at 07/16/22 1048  •  carbidopa-levodopa CR (SINEMET CR)  MG tablet 1 Tablet, 1 Tablet, Oral, 4X/DAY, ADRYAN Pastor  •  atorvastatin (LIPITOR) tablet 40 mg, 40 mg, Oral, QHS, Kevin Kline, D.O., 40 mg at 07/15/22 2028  •  levETIRAcetam (KEPPRA) tablet 500 mg, 500 mg, Oral, BID, Kevin Kline, D.O., 500 mg at 07/16/22 1048  •  losartan (COZAAR) tablet 100 mg, 100 mg, Oral, QAM, Kevin Kline, D.O., 100 mg at 07/16/22 0538  •  senna-docusate (PERICOLACE or SENOKOT S) 8.6-50 MG per tablet 2 Tablet, 2 Tablet, Oral, BID, 2 Tablet at 07/16/22 0538 **AND** polyethylene glycol/lytes (MIRALAX) PACKET 1 Packet, 1 Packet, Oral, QDAY PRN **AND** magnesium hydroxide (MILK OF MAGNESIA) suspension 30 mL, 30 mL, Oral, QDAY PRN **AND** bisacodyl (DULCOLAX) suppository 10 mg, 10 mg, Rectal, QDAY PRN, Kevin Kline, D.O.  •  amLODIPine (NORVASC) tablet 5 mg, 5 mg, Oral, DAILY, Som Sagastume M.D., 5 mg at 07/16/22 0538  •  Respiratory Therapy Consult, , Nebulization, Continuous RT, NABIL Richardson.P.R.N.  •  ipratropium-albuterol (DUONEB) nebulizer solution, 3 mL, Nebulization, Q4H PRN (RT), KANDICE RichardsonP.RIFEOMA.        Review of systems: In addition to what is detailed in the HPI above, all other systems reviewed and are negative.      Physical Examination:     Vitals:    07/15/22 2007 07/15/22 2300 07/16/22 0350 07/16/22 0538   BP: 126/78  119/71 (!) 149/74 130/78   Pulse: 76 72 84    Resp: 19 20 20    Temp: 37.2 °C (98.9 °F) 36.6 °C (97.9 °F) 36.6 °C (97.9 °F)    TempSrc: Temporal Temporal Temporal    SpO2:  95% 92%    Weight:       Height:           General: Patient in no acute distress, pleasant and cooperative.  HEENT: Normocephalic, no signs of acute trauma.   Neck: Supple, no meningeal signs or carotid bruits. There is normal range of motion. No tenderness on exam.   Chest: Regular and unlabored breaths on RA. No cough.   CV: RRR.   Skin: No signs of acute rashes or trauma.   Musculoskeletal: Joints exhibit full range of motion, without any pain to palpation. There are no signs of joint or muscle swelling. There is no tenderness to deep palpation of muscles.   Psychiatric: No hallucinatory behavior. Denies symptoms of depression or suicidal ideation. Mood and affect appear slightly withdrawn, flat c/w hypomimia due to Parkinson's.     NEUROLOGICAL EXAM:   Mental status, orientation: Awake, alert, following commands, and oriented to self, place, situation, and year, but disoriented to month.   Speech and language: Speech is clear, bradyphrenic with low tone, and fluent. The patient is able to name, repeat, and comprehend.   Memory: There is intact recollection of recent and remote events.   Cranial nerve exam: Pupils are 3-4 mm bilaterally and equally reactive to light. Visual fields are full. There is no nystagmus on primary or secondary gaze. Intact full EOM in all directions of gaze. Face appears symmetric. Sensation in the face is intact to light touch. Uvula is midline. Palate elevates symmetrically. Tongue is midline and without any signs of tongue biting or fasciculations. Sternocleidomastoid muscles exhibit is normal strength bilaterally. Shoulder shrug is intact bilaterally.   Motor exam: Strength is antigravity in all extremities with no drift, at least 4+/5 throughout. Tone is normal. Resting tremor with pill-rolling movements were  seen on exam, consistent with known Parkinson's disease.   Sensory exam Reveals normal sense of light touch and pinprick in all extremities.   Deep tendon reflexes: Plantar responses are flexor. There is no clonus.   Coordination: No ataxia with normal finger-nose-finger  Normal rapidly alternating movements.   Gait: Deferred per patient preference.       ANCILLARY DATA REVIEWED:     Labs:  No results found for: PROTHROMBTM, INR   Lab Results   Component Value Date/Time    WBC 5.7 07/14/2022 01:56 AM    RBC 4.68 (L) 07/14/2022 01:56 AM    HEMOGLOBIN 14.9 07/14/2022 01:56 AM    HEMATOCRIT 41.7 (L) 07/14/2022 01:56 AM    MCV 89.1 07/14/2022 01:56 AM    MCH 31.8 07/14/2022 01:56 AM    MCHC 35.7 (H) 07/14/2022 01:56 AM    MPV 8.8 (L) 07/14/2022 01:56 AM    NEUTSPOLYS 61.10 07/14/2022 01:56 AM    LYMPHOCYTES 25.00 07/14/2022 01:56 AM    MONOCYTES 11.10 07/14/2022 01:56 AM    EOSINOPHILS 1.90 07/14/2022 01:56 AM    BASOPHILS 0.70 07/14/2022 01:56 AM      Lab Results   Component Value Date/Time    SODIUM 141 07/15/2022 07:14 PM    POTASSIUM 3.6 07/15/2022 07:14 PM    CHLORIDE 108 07/15/2022 07:14 PM    CO2 22 07/15/2022 07:14 PM    GLUCOSE 104 (H) 07/15/2022 07:14 PM    BUN 15 07/15/2022 07:14 PM    CREATININE 0.71 07/15/2022 07:14 PM    BUNCREATRAT 13 10/22/2020 09:00 AM      Lab Results   Component Value Date/Time    CHOLSTRLTOT 131 04/27/2022 08:45 AM    LDL 66 04/27/2022 08:45 AM    HDL 49 04/27/2022 08:45 AM    TRIGLYCERIDE 81 04/27/2022 08:45 AM       Lab Results   Component Value Date/Time    ALKPHOSPHAT 61 07/14/2022 08:25 AM    ASTSGOT 16 07/14/2022 08:25 AM    ALTSGPT 22 07/14/2022 08:25 AM    TBILIRUBIN 1.2 07/14/2022 08:25 AM        Imaging/Testing:    I interpreted and/or reviewed the patient's neuroimaging    CT-HEAD W/O   Final Result      1.  Cerebral atrophy.      2.  Otherwise, Head CT without contrast within normal limits. No evidence of acute cerebral infarction, hemorrhage or mass lesion.          NM-CARDIAC STRESS TEST   Final Result      US-EXTREMITY VENOUS LOWER BILAT   Final Result      CT-CTA CHEST PULMONARY ARTERY W/ RECONS   Final Result      1.  No evidence of pulmonary embolism to the level of the segmental arteries.   2.  No acute cardiopulmonary abnormality.   3.  Atherosclerotic changes. Mild dilation of the ascending aorta, measuring 4.1 cm.   4.  Coronary artery atherosclerotic disease.   5.  Two 4 mm right upper lobe pulmonary nodules. Follow-up recommendations below.      Low Risk: No routine follow-up      High Risk: Optional CT at 12 months      Comments: Use most suspicious nodule as guide to management. Follow-up intervals may vary according to size and risk.      Low Risk - Minimal or absent history of smoking and of other known risk factors.      High Risk - History of smoking or of other known risk factors.      Note: These recommendations do not apply to lung cancer screening, patients with immunosuppression, or patients with known primary cancer.      Fleischner Society 2017 Guidelines for Management of Incidentally Detected Pulmonary Nodules in Adults      EC-ECHOCARDIOGRAM COMPLETE W/O CONT   Final Result      DX-CHEST-PORTABLE (1 VIEW)   Final Result      1.  No acute cardiac or pulmonary abnormalities are identified.            Assessment and Plan:    Bladimir Mitchell is a 66 y.o. male with relevant history of abnormal LFTs, genital herpes, gynecomastia, hyperlipidemia, hypertension, chronic neck pain, rheumatoid arthritis, and Parkinson's disease on Sinemet who presented to [facility] on 7/14/22 for shortness of breath and altered mental status for whom neurology was consulted to address his AMS and Parkinson's medication management. Neurological exam is consistent with known Parkinson's disease with hypomimia, resting tremor with pill-rolling with increasing intensity and frequency intermittently during interview. He reports his tremor has been stable if not  somewhat worsened since decreasing his Sinemet outpatient; however, it appears his intermittent spells of shortness of breath, unresponsiveness, confusion, and memory loss persist. These spells may be multifactorial with his unresponsiveness and confusion being secondary to syncopal episodes with orthostatic hypotension versus subclinical seizure activity versus delirium. Likewise, his shortness of breath could be cardiac in nature due recent EKG findings of A flutter versus anxiety. Will recommend increasing his Sinemet frequency to 4 times daily to better control his tremors. Additionally agree with obtaining a video EEG to rule out subclinical seizures.     Plan:    -q4h and PRN neuro assessment. VS per nursing/unit protocol. -Telemetry; currently SR. Screen for arrhythmia.  -Normotensive BP goal 110-130/60-80. Antihypertensives per primary team and cardiology.   -Orthostatic BP and HR per RN in laying, sitting, standing positions  -Agree with routine video EEG to evaluate for subclinical seizures to explain his intermittent spells disorientation and unresponsiveness  -Continue Keppra 500mg PO BID, pending EEG results  -Carbidopa/levodopa 25/100 mg increased to 4 times daily given worsening resting tremor  -Attempt to minimize risk of delirium such as avoid day time napping and promote night time sleep, monitor for constipation, remove lines/tubing that is not needed, avoid early lab draws and vital checks, limit polypharmacy as able, and keep close to the window  -PT/OT/SLP eval and treat.   -Fall and seizure precautions  -DVT PPX: SCDs, DVT chemo-prophylaxis held for cardiac angio    The evaluation of the patient, and recommended management, was discussed with Dr. Rick, Dr. Souza, and bedside RN.     Bladimir Temple, TARIQ [facility]-BC  Nurse Practitioner  [facility]  (t) [phone]

## 2022-07-16 NOTE — CONSULTS
Cardiology Initial Consult Note    Date of note:    7/16/2022      Consulting Physician: Sina Souza M.D.    Name:   Bladimir Mitchell   YOB: 1955  Age:   66 y.o.  male   MRN:   [id number]      Reason for Consultation: Chest pain, Abnormal EKG on admission, Abnormal nuclear stress test    HPI:  Bladimir Mitchell is a 66 y.o. male with a history of hypertension, hyperlipidemia, hypokalemia and Parkinson's disease  who presented to the ED on 7/14/2022 for complaints of chest pain and shortness of breath.      Per the patient, his mother had brought him in as she was concerned about his breathing. States the shortness of breath is at rest and with exertion.  He states along with the shortness of breath he had mid substernal chest pain.  Patient had difficulty describing the pain and was unsure if the pain radiated to his arms, back, neck or jaw.  Denies any aggravating or alleviating symptoms. The shortness of breath has been ongoing for the past couple of months with worsening of symptoms shortly after being started on Sinemet.  Patient currently denies tobacco use.  He quit using tobacco 15 years ago.  He smoked 2 packs/day for about 25 years.  Patient unsure about any heart disease in his family as he was adopted.  States he currently lives with his mother who is 91 years old who is his primary caretaker.  He denies any fevers, chills, nausea/vomiting, blurry vision, numbness/tingling, orthopnea, palpitations, swelling, cough, wheezing, abdominal pain, heartburn.      Review of Systems   Constitutional: Positive for malaise/fatigue. Negative for chills, diaphoresis, fever, weight gain and weight loss.   HENT: Negative for hearing loss, hoarse voice, nosebleeds and sore throat.    Eyes: Negative for blurred vision.   Cardiovascular: Positive for chest pain and dyspnea on exertion. Negative for irregular heartbeat, leg swelling, near-syncope, orthopnea, palpitations, paroxysmal nocturnal dyspnea  and syncope.   Respiratory: Positive for shortness of breath. Negative for cough, sputum production and wheezing.    Hematologic/Lymphatic: Negative for bleeding problem. Does not bruise/bleed easily.   Musculoskeletal: Negative for back pain, joint pain and neck pain.   Gastrointestinal: Negative for abdominal pain, constipation, diarrhea, heartburn, hematochezia, melena, nausea and vomiting.   Genitourinary: Negative for dysuria, frequency, hematuria and urgency.   Neurological: Positive for tremors and weakness. Negative for excessive daytime sleepiness, dizziness, focal weakness, headaches, light-headedness, numbness, paresthesias, seizures and vertigo.   Psychiatric/Behavioral: Negative for altered mental status, depression, hallucinations, memory loss and substance abuse. The patient does not have insomnia and is not nervous/anxious.      A complete ROS was performed and is negative except for pertinent positives mentioned in HPI.      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   7/13/2022 at AM   • amLODIPine (NORVASC) 5 MG Tab Take 5 mg by mouth every day.   7/13/2022 at AM   • Artificial Tear Ointment (DRY EYES OP) Administer 1 Drop into both eyes every 48 hours.   7/13/2022 at Afternoon   • docusate sodium (COLACE) 100 MG Cap Take 100 mg by mouth 3 times a day as needed for Constipation.    7/13/2022 at AM   • levETIRAcetam (KEPPRA) 500 MG Tab Take 1 Tablet by mouth 2 times a day. 60 Tablet 2 7/13/2022 at AM   • carbidopa-levodopa CR (SINEMET CR)  MG per tablet Take 1 Tablet by mouth 3 times a day. 270 Tablet 2 7/13/2022 at 1400   • losartan (COZAAR) 100 MG Tab Take 1 Tablet by mouth every day. 90 Tablet 0 7/13/2022 at AM   • atorvastatin (LIPITOR) 40 MG Tab Take 1 Tab by mouth every day. 90 Tab 3 7/12/2022 at PM     Current Facility-Administered Medications   Medication Dose Route Frequency Provider Last Rate Last Admin   • aspirin EC (ECOTRIN) tablet 81 mg  81 mg Oral DAILY Melia Chamorro M.D.       • metoprolol tartrate (LOPRESSOR) tablet 25 mg  25 mg Oral TWICE DAILY Melia Chamorro M.D.       • carbidopa-levodopa CR (SINEMET CR)  MG tablet 1 Tablet  1 Tablet Oral TID KASIA Radford.O.   1 Tablet at 07/15/22 2028   • atorvastatin (LIPITOR) tablet 40 mg  40 mg Oral QHS Kevin Kline D.O.   40 mg at 07/15/22 2028   • levETIRAcetam (KEPPRA) tablet 500 mg  500 mg Oral BID Kevin Kline D.O.   500 mg at 07/15/22 2028   • losartan (COZAAR) tablet 100 mg  100 mg Oral QAM Kevin Kline D.O.   100 mg at 07/16/22 0538   • senna-docusate (PERICOLACE or SENOKOT S) 8.6-50 MG per tablet 2 Tablet  2 Tablet Oral BID KASIA Radford.O.   2 Tablet at 07/16/22 0538    And   • polyethylene glycol/lytes (MIRALAX) PACKET 1 Packet  1 Packet Oral QDAY PRN Kevin Kline D.O.        And   • magnesium hydroxide (MILK OF MAGNESIA) suspension 30 mL  30 mL Oral QDAY PRN Kevin Kline D.O.        And   • bisacodyl (DULCOLAX) suppository 10 mg  10 mg Rectal QDAY PRN Kevin Kline D.O.       • amLODIPine (NORVASC) tablet 5 mg  5 mg Oral DAILY Som Sagastume M.D.   5 mg at 07/16/22 0538   • Respiratory Therapy Consult   Nebulization Continuous RT MARYANNE Richardson.       • ipratropium-albuterol (DUONEB) nebulizer solution  3 mL Nebulization Q4H PRN (RT) Camila WOODSON  "ADRYAN Johnson             Allergies  Patient denies any environmental or medication allergies.       Family History   Problem Relation Age of Onset   • No Known Problems Mother    • No Known Problems Father    • No Known Problems Maternal Grandmother    • No Known Problems Maternal Grandfather    • No Known Problems Paternal Grandmother    • No Known Problems Paternal Grandfather    Patient states he does not know any of his family history as he was adopted.       Social History     Socioeconomic History   • Marital status: Single     Spouse name: Not on file   • Number of children: Not on file   • Years of education: Not on file   • Highest education level: Not on file   Occupational History   • Not on file   Tobacco Use   • Smoking status: Former Smoker     Packs/day: 2.00     Years: 25.00     Pack years: 50.00     Types: Cigarettes     Quit date:      Years since quittin.5   • Smokeless tobacco: Never Used   Vaping Use   • Vaping Use: Never used   Substance and Sexual Activity   • Alcohol use: Not Currently     Alcohol/week: 0.6 - 1.2 oz     Types: 1 - 2 Shots of liquor per week     Comment: min                    Occasional use: Last used alcohol 5 months ago.   • Drug use: No   • Sexual activity: Yes   Other Topics Concern   • Not on file   Social History Narrative   • Not on file     Social Determinants of Health     Financial Resource Strain: Not on file   Food Insecurity: Not on file   Transportation Needs: Not on file   Physical Activity: Not on file   Stress: Not on file   Social Connections: Not on file   Intimate Partner Violence: Not on file   Housing Stability: Not on file         No intake or output data in the 24 hours ending 22 0942     Physical Exam  Body mass index is 24.29 kg/m².  /78   Pulse 84   Temp 36.6 °C (97.9 °F) (Temporal)   Resp 20   Ht 1.854 m (6' 1\")   Wt 83.5 kg (184 lb 1.4 oz)   SpO2 92%   Vitals:    07/15/22 2007 07/15/22 2300 22 0350 22 " 0538   BP: 126/78 119/71 (!) 149/74 130/78   Pulse: 76 72 84    Resp: 19 20 20    Temp: 37.2 °C (98.9 °F) 36.6 °C (97.9 °F) 36.6 °C (97.9 °F)    TempSrc: Temporal Temporal Temporal    SpO2:  95% 92%    Weight:       Height:         Oxygen Therapy:  Pulse Oximetry: 92 %, O2 (LPM): 0, O2 Delivery Device: None - Room Air    General: Weak, appearing male in no apparent distress  Eyes: nl conjunctiva  ENT: OP clear  Neck: JVP not elevated, no carotid bruits  Lungs: normal respiratory effort, CTAB  Heart: RRR, no murmurs, no rubs or gallops, no edema bilateral lower extremities. No LV/RV heave on cardiac palpatation. 2+ bilateral radial pulses.  2+ bilateral dp pulses.   Abdomen: soft, non tender, non distended, no masses, normal bowel sounds.  No HSM.  Extremities/MSK: no clubbing, no cyanosis  Neurological: No focal sensory deficits, tremors noted in left hand and right and left feet at rest.  Psychiatric: Appropriate affect, A/O x 4  Skin: Warm extremities      Labs (personally reviewed and notable for):   Lab Results   Component Value Date/Time    SODIUM 141 07/15/2022 07:14 PM    POTASSIUM 3.6 07/15/2022 07:14 PM    CHLORIDE 108 07/15/2022 07:14 PM    CO2 22 07/15/2022 07:14 PM    GLUCOSE 104 (H) 07/15/2022 07:14 PM    BUN 15 07/15/2022 07:14 PM    CREATININE 0.71 07/15/2022 07:14 PM    BUNCREATRAT 13 10/22/2020 09:00 AM      Lab Results   Component Value Date/Time    WBC 5.7 07/14/2022 01:56 AM    RBC 4.68 (L) 07/14/2022 01:56 AM    HEMOGLOBIN 14.9 07/14/2022 01:56 AM    HEMATOCRIT 41.7 (L) 07/14/2022 01:56 AM    MCV 89.1 07/14/2022 01:56 AM    MCH 31.8 07/14/2022 01:56 AM    MCHC 35.7 (H) 07/14/2022 01:56 AM    MPV 8.8 (L) 07/14/2022 01:56 AM    NEUTSPOLYS 61.10 07/14/2022 01:56 AM    LYMPHOCYTES 25.00 07/14/2022 01:56 AM    MONOCYTES 11.10 07/14/2022 01:56 AM    EOSINOPHILS 1.90 07/14/2022 01:56 AM    BASOPHILS 0.70 07/14/2022 01:56 AM      Lab Results   Component Value Date/Time    CHOLSTRLTOT 131 04/27/2022  08:45 AM    LDL 66 04/27/2022 08:45 AM    HDL 49 04/27/2022 08:45 AM    TRIGLYCERIDE 81 04/27/2022 08:45 AM       Lab Results   Component Value Date/Time    TROPONINT 20 (H) 07/14/2022 2336     No results found for: NTPROBNP      Cardiac Imaging and Procedures Review:    EKG dated 7/14/2022 :  EKG shows multiple artifact.  Appears sinus rhythm with rate of 75.      Repeat EKG dated 7/15/2022 : EKG sinus rhythm, left anterior fascicular block, left axis deviation, rate 86. Do not appreciate any ST segment elevation or T wave inversions.    Echo dated 7/15/202: Poor quality study, normal left ventricular systolic function.    Nuclear stress testing 7/15/2022: No reversible ischemia. Small size infarct apical inferior/mid inferior segment.  Mild hypokinesis inferior septal wall.    Telemetry (last 24 hours): Normal sinus rhythm with heart rate in the 60s-90s with rare PVCs and PACs.       Radiology test Review:  CT-HEAD W/O   Final Result      1.  Cerebral atrophy.      2.  Otherwise, Head CT without contrast within normal limits. No evidence of acute cerebral infarction, hemorrhage or mass lesion.         NM-CARDIAC STRESS TEST   Final Result      US-EXTREMITY VENOUS LOWER BILAT   Final Result      CT-CTA CHEST PULMONARY ARTERY W/ RECONS   Final Result      1.  No evidence of pulmonary embolism to the level of the segmental arteries.   2.  No acute cardiopulmonary abnormality.   3.  Atherosclerotic changes. Mild dilation of the ascending aorta, measuring 4.1 cm.   4.  Coronary artery atherosclerotic disease.   5.  Two 4 mm right upper lobe pulmonary nodules. Follow-up recommendations below.      Low Risk: No routine follow-up      High Risk: Optional CT at 12 months      Comments: Use most suspicious nodule as guide to management. Follow-up intervals may vary according to size and risk.      Low Risk - Minimal or absent history of smoking and of other known risk factors.      High Risk - History of smoking or of other  known risk factors.      Note: These recommendations do not apply to lung cancer screening, patients with immunosuppression, or patients with known primary cancer.      Fleischner Society 2017 Guidelines for Management of Incidentally Detected Pulmonary Nodules in Adults      EC-ECHOCARDIOGRAM COMPLETE W/O CONT   Final Result      DX-CHEST-PORTABLE (1 VIEW)   Final Result      1.  No acute cardiac or pulmonary abnormalities are identified.          Problem list:  Active Problems:    Essential hypertension POA: Yes      Overview: Sitting  this morning at 9AM. Standing  Took 5 mg amlodipine       at 10AM. Patient initially said she retook BP at home at 11AM, and BP       improved, but later denied taking repeat BP at home. Reports that only BP       after med was in the clinic. In clinic at 1PM 138/77. Neurologist stopped       amlodipine because he thought it may be contributing to passing out.           Advanced care planning/counseling discussion POA: Unknown      Overview: Colonoscopy in 2017 by JAN BIRCH    Parkinson's disease (HCC) POA: Yes    Hypokalemia POA: Yes    Atrial flutter (HCC) POA: Yes    Dyspnea POA: Yes    Positive D dimer POA: Unknown    Chest pressure POA: Unknown    AMS (altered mental status) POA: Unknown  Resolved Problems:    * No resolved hospital problems. *      Impression and Medical Decision Making:  #Atypical chest pain  #Dyspnea  #Abnormal EKG  #Abnormal nuclear stress test  #CAD on imaging   #Hypertension  #Hyperlipidemia  #Parkinson    Recommendations:  --EKG on admission showed multiple artifact likely due to Parkinson tremors, with repeat showing sinus rhythm.  No ST changes. Troponins stable.   --CT PE negative for PE but showed calcifications in LAD.  Abnormal nuclear stress test showed no reversible ischemia, small size infarct apical inferior, mid inferior segments and mild hypokinesis inferior septal wall.  Patient will need cath, however patient received Xarelto   yesterday at 1710.  Recommend holding Xarelto 48 hours prior to cath, so cath will likely be scheduled for Monday. Hold Xarelto. Patient will need to be NPO at midnight prior to cath.   --For medical therapy recommend continuing statin.  Also, patient with chest pain will need aspirin and beta-blocker.  Started aspirin 81 mg and metoprolol 25 mg twice daily.  --Continue strict blood pressure control.

## 2022-07-16 NOTE — DISCHARGE PLANNING
Received Choice form at 7/14  Agency/Facility Name: [facility]   Referral sent per Choice form @ 4107

## 2022-07-16 NOTE — DISCHARGE PLANNING
Case Management Discharge Planning    Admission Date: 7/14/2022  GMLOS: 1.8 (Value from CMS.gov Table.)  ALOS: 0    6-Clicks ADL Score: 20  6-Clicks Mobility Score: 18      Anticipated Discharge Dispo: Discharge Disposition: Discharged to home/self care (01)    DME Needed: Yes    DME Ordered: No    Action(s) Taken: Pt was accepted by [facility], however, per MD, pt needs placement. Pt lives with his mother who states she cannot care for him. SW inquired of pt's dc/placement needs.     Escalations Completed: None    Medically Clear: No    Next Steps: Get clarification from care team on what type of placement pt needs.     Barriers to Discharge: Medical clearance and Pending Placement    Is the patient up for discharge tomorrow: No

## 2022-07-17 PROBLEM — R25.1 TREMOR: Status: ACTIVE | Noted: 2022-07-17

## 2022-07-17 PROCEDURE — 97116 GAIT TRAINING THERAPY: CPT

## 2022-07-17 PROCEDURE — 770001 HCHG ROOM/CARE - MED/SURG/GYN PRIV*

## 2022-07-17 PROCEDURE — 700102 HCHG RX REV CODE 250 W/ 637 OVERRIDE(OP): Performed by: NURSE PRACTITIONER

## 2022-07-17 PROCEDURE — A9270 NON-COVERED ITEM OR SERVICE: HCPCS | Performed by: INTERNAL MEDICINE

## 2022-07-17 PROCEDURE — A9270 NON-COVERED ITEM OR SERVICE: HCPCS

## 2022-07-17 PROCEDURE — A9270 NON-COVERED ITEM OR SERVICE: HCPCS | Performed by: NURSE PRACTITIONER

## 2022-07-17 PROCEDURE — 700102 HCHG RX REV CODE 250 W/ 637 OVERRIDE(OP)

## 2022-07-17 PROCEDURE — 99233 SBSQ HOSP IP/OBS HIGH 50: CPT | Performed by: STUDENT IN AN ORGANIZED HEALTH CARE EDUCATION/TRAINING PROGRAM

## 2022-07-17 PROCEDURE — 700102 HCHG RX REV CODE 250 W/ 637 OVERRIDE(OP): Performed by: INTERNAL MEDICINE

## 2022-07-17 PROCEDURE — 97168 OT RE-EVAL EST PLAN CARE: CPT

## 2022-07-17 RX ADMIN — METOPROLOL TARTRATE 25 MG: 25 TABLET, FILM COATED ORAL at 05:36

## 2022-07-17 RX ADMIN — AMLODIPINE BESYLATE 5 MG: 5 TABLET ORAL at 05:35

## 2022-07-17 RX ADMIN — ASPIRIN 81 MG: 81 TABLET, COATED ORAL at 05:35

## 2022-07-17 RX ADMIN — ATORVASTATIN CALCIUM 40 MG: 40 TABLET, FILM COATED ORAL at 20:41

## 2022-07-17 RX ADMIN — LEVETIRACETAM 500 MG: 500 TABLET, FILM COATED ORAL at 08:26

## 2022-07-17 RX ADMIN — LEVETIRACETAM 500 MG: 500 TABLET, FILM COATED ORAL at 20:41

## 2022-07-17 RX ADMIN — SENNOSIDES AND DOCUSATE SODIUM 2 TABLET: 50; 8.6 TABLET ORAL at 05:36

## 2022-07-17 RX ADMIN — CARBIDOPA AND LEVODOPA 1 TABLET: 25; 100 TABLET, EXTENDED RELEASE ORAL at 08:26

## 2022-07-17 RX ADMIN — METOPROLOL TARTRATE 25 MG: 25 TABLET, FILM COATED ORAL at 17:51

## 2022-07-17 RX ADMIN — CARBIDOPA AND LEVODOPA 1 TABLET: 25; 100 TABLET, EXTENDED RELEASE ORAL at 13:32

## 2022-07-17 RX ADMIN — SENNOSIDES AND DOCUSATE SODIUM 2 TABLET: 50; 8.6 TABLET ORAL at 17:51

## 2022-07-17 RX ADMIN — CARBIDOPA AND LEVODOPA 1 TABLET: 25; 100 TABLET, EXTENDED RELEASE ORAL at 20:41

## 2022-07-17 RX ADMIN — CARBIDOPA AND LEVODOPA 1 TABLET: 25; 100 TABLET, EXTENDED RELEASE ORAL at 17:51

## 2022-07-17 RX ADMIN — LOSARTAN POTASSIUM 100 MG: 50 TABLET, FILM COATED ORAL at 05:35

## 2022-07-17 ASSESSMENT — COGNITIVE AND FUNCTIONAL STATUS - GENERAL
MOBILITY SCORE: 14
EATING MEALS: A LITTLE
TOILETING: A LOT
DAILY ACTIVITIY SCORE: 15
MOVING FROM LYING ON BACK TO SITTING ON SIDE OF FLAT BED: A LOT
PERSONAL GROOMING: A LITTLE
MOVING TO AND FROM BED TO CHAIR: A LOT
HELP NEEDED FOR BATHING: A LOT
CLIMB 3 TO 5 STEPS WITH RAILING: A LOT
TURNING FROM BACK TO SIDE WHILE IN FLAT BAD: A LOT
WALKING IN HOSPITAL ROOM: A LITTLE
DRESSING REGULAR UPPER BODY CLOTHING: A LITTLE
SUGGESTED CMS G CODE MODIFIER DAILY ACTIVITY: CK
DRESSING REGULAR LOWER BODY CLOTHING: A LOT
SUGGESTED CMS G CODE MODIFIER MOBILITY: CL
STANDING UP FROM CHAIR USING ARMS: A LITTLE

## 2022-07-17 ASSESSMENT — PAIN DESCRIPTION - PAIN TYPE
TYPE: ACUTE PAIN;SURGICAL PAIN
TYPE: ACUTE PAIN
TYPE: ACUTE PAIN

## 2022-07-17 ASSESSMENT — GAIT ASSESSMENTS
ASSISTIVE DEVICE: FRONT WHEEL WALKER
GAIT LEVEL OF ASSIST: MINIMAL ASSIST
DISTANCE (FEET): 40

## 2022-07-17 ASSESSMENT — PATIENT HEALTH QUESTIONNAIRE - PHQ9
SUM OF ALL RESPONSES TO PHQ9 QUESTIONS 1 AND 2: 0
1. LITTLE INTEREST OR PLEASURE IN DOING THINGS: NOT AT ALL
2. FEELING DOWN, DEPRESSED, IRRITABLE, OR HOPELESS: NOT AT ALL

## 2022-07-17 ASSESSMENT — ACTIVITIES OF DAILY LIVING (ADL): TOILETING: INDEPENDENT

## 2022-07-17 NOTE — CARE PLAN
The patient is Stable - Low risk of patient condition declining or worsening    Shift Goals  Clinical Goals: Rest; Mobility  Patient Goals: rest  Family Goals: rest, cath lab monday    Progress made toward(s) clinical / shift goals:  Patient has been resting and is now sitting up in a chair.       Problem: Knowledge Deficit - Standard  Goal: Patient and family/care givers will demonstrate understanding of plan of care, disease process/condition, diagnostic tests and medications  Outcome: Progressing     Problem: Skin Integrity  Goal: Skin integrity is maintained or improved  Outcome: Progressing     Problem: Fall Risk  Goal: Patient will remain free from falls  Outcome: Progressing

## 2022-07-17 NOTE — DISCHARGE PLANNING
Case Management Discharge Planning    Admission Date: 7/14/2022  GMLOS: 1.8 (Value from CMS.gov Table.)  ALOS: 0    6-Clicks ADL Score: 20  6-Clicks Mobility Score: 18      Anticipated Discharge Dispo: Discharge Disposition: D/T to SNF with Medicare cert in anticipation of skilled care (03)  Discharge Contact Phone Number: [phone]    Action(s) Taken: RN YUMIKO spoke with patient at bedside and mother, Tania via telephone.  Pt and Tania live in an apartment, first floor in [place].  Pt does not use DME.  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  Pt has a son in [place] and a daughter out-of-state.  Tania is 89 yoa.  She stated that patient is too weak to come home right now and needs rehab.  Pt agreed.  Pt observed to have intermittent pill rolling and had difficulty with pushing keys on telephone and remembering number.  He stated that they have been using uber or lyft for appointments and were unaware that [facility] has transportation services for medical appointments and pharmacy.  Tania cooks and has been helping patient with medications as he has been having trouble with memory.  I explained acute rehab and SNF for rehab.  They provided consent to send referrals to [facility], [facility] and [facility].  Choice forms faxed to [facility].    PASRR:  [id number]    Medically Clear: No    Next Steps: Follow up on referrals.    Barriers to Discharge: Medical clearance and placement acceptance.    Care Transition Team Assessment    Information Source  Orientation Level: Oriented X4  Information Given By: Patient, Relative  Informant's Name: Bladimir Mitchell & Tania Barnard  Who is responsible for making decisions for patient? : Patient    Readmission Evaluation  Is this a readmission?: No    Elopement Risk  Legal Hold: No  Ambulatory or Self Mobile in Wheelchair: Yes  Disoriented: No  Psychiatric Symptoms: None  History of Wandering: No  Elopement this Admit:  No  Vocalizing Wanting to Leave: No  Displays Behaviors, Body Language Wanting to Leave: No-Not at Risk for Elopement  Elopement Risk: Not at Risk for Elopement  Wanderguard On: No (See Comments)    Interdisciplinary Discharge Planning  Lives with - Patient's Self Care Capacity: Parents  Patient or legal guardian wants to designate a caregiver: No  Support Systems: Family Member(s), Parent, Friends / Neighbors  Housing / Facility: [address]  Prior Services: None  Durable Medical Equipment: Not Applicable    Discharge Preparedness  What is your plan after discharge?: Skilled nursing facility, Other (comment)  What are your discharge supports?: Child, Parent, Other (comment)  Prior Functional Level: Ambulatory, Independent with Activities of Daily Living, Needs Assist with Medication Management  Difficulity with ADLs: Walking  Difficulity with IADLs: Driving, Managing medication, Shopping, Using the telephone or computer    Functional Assesment  Prior Functional Level: Ambulatory, Independent with Activities of Daily Living, Needs Assist with Medication Management    Finances  Financial Barriers to Discharge: No  Prescription Coverage: Yes    Vision / Hearing Impairment  Vision Impairment : Yes  Right Eye Vision: Impaired, Wears Glasses  Left Eye Vision: Impaired, Wears Glasses  Hearing Impairment : No         Advance Directive  Advance Directive?: None  Advance Directive offered?: AD Booklet given    Domestic Abuse  Have you ever been the victim of abuse or violence?: No  Physical Abuse or Sexual Abuse: No  Verbal Abuse or Emotional Abuse: No  Possible Abuse/Neglect Reported to:: Not Applicable         Discharge Risks or Barriers  Patient risk factors: Cognitive / sensory / physical deficit    Anticipated Discharge Information  Discharge Disposition: D/T to SNF with Medicare cert in anticipation of skilled care (03)  Discharge Contact Phone Number: [phone]

## 2022-07-17 NOTE — EEG PROGRESS NOTE
Per Dr. Prabhjot Peck MD and Bladimir ARROYO - Neurology - CVEEG is not urgent and is okay to be hooked for monitoring Monday morning.

## 2022-07-17 NOTE — DISCHARGE PLANNING
Referral sent per choice to [facility] and [facility].    1350- Received Choice form at 1350  Agency/Facility Name: [facility]  Referral sent per Choice form at 1352

## 2022-07-17 NOTE — ASSESSMENT & PLAN NOTE
Worsening tremor likely in setting of Parkinson , unable to rule out seizure  Sinemet dose adjusted by neurologist and recommending deep brain stimulaton as outpatient  Initial EEG unremarkable for ongoing seizure  24-hour video EEG ordered - Negative for any epilepsy/seizure

## 2022-07-17 NOTE — PROGRESS NOTES
Hospital Medicine Daily Progress Note    Date of Service  7/17/2022    Chief Complaint  Bladimir Mitchell is a 66 y.o. male admitted 7/14/2022 with shortness of breath     Hospital Course     Patient 66-year-old male with past medical history of Parkinson's disease, hypokalemia, hypertension who presented 7/14 for concerns with shortness of breath.       In ED: Temperature 36.8 °C, blood pressure 177/116, pulse 98, respiratory 20, oxygen 100% on room air  Labs: WBC 5.7, sodium 141, potassium 3.0, troponin 20, 25,   EKG revealing atrial flutter   Admitted for further work up    Interval Problem Update  7/15/2022  Vitals remained stable.  Patient was AOx3 morning around.  He have intermittent confusion.  CT head ordered for further evaluation.  Neurochecks every 4 hours  Noted a significant elevated D-dimer.  CT angio chest negative for PE.  Pending DVT study  Initial EKG noted a flutter, repeated EKG noted in sinus rhythm.  Will rule out COVID-19 pneumonia given significant D-dimer and shortness of breath  Discussed with cardiology Dr. Gill , plan to get nuclear study to rule out underlying CAD     Need home O2 evaluation  PT/OT evaluation     7/16/2022  Vitals remained stable.  Worsening tremor.  CT is unremarkable.  DVT study negative COVID PCR negative.  PT/OT recommending home with home health.  Stress test noted with no reversible ischemia.Small sized infarct apical inferior, mid inferior segments.  Cardiology recommending discharge patient on aspirin and metoprolol and follow-up with outpatient.     EEG ordered.  Neurology was consulted for worsening tremor.    As per patient's mother she went to be able to take care of him.  Likely need  follow-up to assist with discharge plan      7/17/2022  Vitals remained stable  Persistent tremor unchanged.  PT/OT recommending SNF placement.  Patient agreed to go to SNF if needed.  EEG shows stable on specific encephalopathy with no active  seizure.  Patient's mother concerned about ongoing seizure, as patient had seizure-like episode every other weeks when he have episode of confusion.  Discussed with neurology.  24-hour video EEG monitoring ordered       assisting with placement      I have discussed this patient's plan of care and discharge plan at IDT rounds today with Case Management, Nursing, Nursing leadership, and other members of the IDT team.     Consultants/Specialty  cardiology     Code Status  Full Code     Disposition  Patient is not medically cleared for discharge.   Anticipate discharge to to home with close outpatient follow-up.  I have placed the appropriate orders for post-discharge needs.     Review of Systems  Review of Systems   Constitutional: Negative for fever.   HENT: Negative for hearing loss.    Eyes: Negative for blurred vision.   Respiratory: Positive for shortness of breath. Negative for cough.    Cardiovascular: Negative for chest pain.   Gastrointestinal: Negative for nausea and vomiting.   Genitourinary: Negative for dysuria.   Musculoskeletal: Negative for myalgias.   Skin: Negative for rash.   Neurological: Negative for dizziness.   Endo/Heme/Allergies: Does not bruise/bleed easily.         Physical Exam  Temp:  [36.5 °C (97.7 °F)-37 °C (98.6 °F)] 36.5 °C (97.7 °F)  Pulse:  [72-92] 82  Resp:  [18-32] 20  BP: (111-139)/(65-83) 111/65  SpO2:  [93 %-99 %] 93 %     Physical Exam  Constitutional:       General: He is not in acute distress.  HENT:      Head: Normocephalic and atraumatic.      Right Ear: Tympanic membrane normal.      Left Ear: Tympanic membrane normal.      Nose: Nose normal.      Mouth/Throat:      Mouth: Mucous membranes are moist.   Eyes:      Extraocular Movements: Extraocular movements intact.      Pupils: Pupils are equal, round, and reactive to light.   Cardiovascular:      Rate and Rhythm: Normal rate and regular rhythm.      Pulses: Normal pulses.   Pulmonary:      Effort: Pulmonary  effort is normal. No respiratory distress.   Abdominal:      General: Abdomen is flat. There is no distension.   Musculoskeletal:      Cervical back: Normal range of motion.      Right lower leg: No edema.      Left lower leg: No edema.   Skin:     General: Skin is warm.   Neurological:      General: No focal deficit present.  Tremor noted      Mental Status: He is alert and oriented to person, place, and time. Mental status is at baseline.   Psychiatric:         Mood and Affect: Mood normal.   Fluids  No intake or output data in the 24 hours ending 07/17/22 1328    Laboratory      Recent Labs     07/15/22  1914   SODIUM 141   POTASSIUM 3.6   CHLORIDE 108   CO2 22   GLUCOSE 104*   BUN 15   CREATININE 0.71   CALCIUM 9.2                   Imaging  CT-HEAD W/O   Final Result      1.  Cerebral atrophy.      2.  Otherwise, Head CT without contrast within normal limits. No evidence of acute cerebral infarction, hemorrhage or mass lesion.         NM-CARDIAC STRESS TEST   Final Result      US-EXTREMITY VENOUS LOWER BILAT   Final Result      CT-CTA CHEST PULMONARY ARTERY W/ RECONS   Final Result      1.  No evidence of pulmonary embolism to the level of the segmental arteries.   2.  No acute cardiopulmonary abnormality.   3.  Atherosclerotic changes. Mild dilation of the ascending aorta, measuring 4.1 cm.   4.  Coronary artery atherosclerotic disease.   5.  Two 4 mm right upper lobe pulmonary nodules. Follow-up recommendations below.      Low Risk: No routine follow-up      High Risk: Optional CT at 12 months      Comments: Use most suspicious nodule as guide to management. Follow-up intervals may vary according to size and risk.      Low Risk - Minimal or absent history of smoking and of other known risk factors.      High Risk - History of smoking or of other known risk factors.      Note: These recommendations do not apply to lung cancer screening, patients with immunosuppression, or patients with known primary cancer.       Fleischner Society 2017 Guidelines for Management of Incidentally Detected Pulmonary Nodules in Adults      EC-ECHOCARDIOGRAM COMPLETE W/O CONT   Final Result      DX-CHEST-PORTABLE (1 VIEW)   Final Result      1.  No acute cardiac or pulmonary abnormalities are identified.           Assessment/Plan  * Tremor- (present on admission)  Assessment & Plan  Worsening tremor likely in setting of Parkinson , unable to rule out seizure  Sinemet dose adjusted by neurologist and recommending deep brain stimulaton as outpatient  Initial EEG unremarkable for ongoing seizure  24-hour video EEG ordered      AMS (altered mental status)  Assessment & Plan  AMS, likely delirium   CT head unremarkable  UA negative    Chest pressure  Assessment & Plan  Troponin x 3 trended flat  EKG with nonsignificant ST changes  Echo unremarkable  Stress test noted with no reversible ischemia.Small sized infarct apical inferior, mid inferior segments.  Cardiology recommending discharge patient on aspirin and metoprolol and follow-up with outpatient.        Positive D dimer  Assessment & Plan  Noted a significant elevated D-dimer.  CT angio chest negative for PE.    Initial EKG noted a flutter, repeated EKG noted in sinus rhythm.  Will rule out COVID-19 negative  DVT study negative    Dyspnea- (present on admission)  Assessment & Plan  Nonfocal dyspnea, chronic, intermittent, worse on exertion  - Currently on room air  - rule out covid     Atrial flutter (HCC)- (present on admission)  Assessment & Plan  Confirmed with cardiology no a flutter noted on initial  ekg   Xarelto discontinued    Hypokalemia- (present on admission)  Assessment & Plan  Monitor and replace as needed, appears chronic    Parkinson's disease (HCC)- (present on admission)  Assessment & Plan  Continue home Sinemet    Advanced care planning/counseling discussion  Assessment & Plan  I spent 17 minutes at bedside with patient discussing work-up, results, diagnosis, prognosis.  CODE  STATUS discussed with patient and wants to be Full code.    Essential hypertension- (present on admission)  Assessment & Plan  Blood pressure elevated on admission  - Continue home losartan  - If blood pressure still elevated can consider IV as needed       VTE prophylaxis: SCDs/TEDs  Oral xarleto   I have performed a physical exam and reviewed and updated ROS and Plan today (7/17/2022). In review of yesterday's note (7/16/2022), there are no changes except as documented above.

## 2022-07-17 NOTE — THERAPY
"Occupational Therapy   Re- Evaluation     Patient Name: Bladimir Mitchell  Age:  66 y.o., Sex:  male  Medical Record #: [id number]  Today's Date: 7/17/2022       Precautions: Fall Risk  Comments: PD    Assessment  Pt was seen for OT re-eval, pt appears to have declined from original eval on 7/15, pt has intermittent confusion, slow processing, poor safety awareness, increased BUE tremors, and staggered festering gait for which pt had several LOB. Pt's PD symptoms currently appear exacerbated which are grealty impacting his participation in ADL's and mobility. OT will follw and currently recommend post acute placement.     Plan  Recommend Occupational Therapy 4 times per week until therapy goals are met for the following treatments:  Cognitive Skill Development, Self Care/Activities of Daily Living, Therapeutic Activities and Therapeutic Exercises.    DC Equipment Recommendations: Unable to determine at this time  Discharge Recommendations: Recommend post-acute placement for additional occupational therapy services prior to discharge home     Subjective  \" I need to use the bathroom\"      Objective     07/17/22 1235   Charge Group   OT Evaluation OT Re-evaluation   Total Time Spent   OT Time Spent Yes   OT Evaluation (Minutes) 20   OT Total Time Spent (Calculated) 20   Initial Contact Note    Initial Contact Note Order Received and Verified, Occupational Therapy Evaluation in Progress with Full Report to Follow.   Prior Living Situation   Prior Services None   Housing / Facility 1 Story House   Steps Into Home 0   Steps In Home 0   Equipment Owned None   Lives with - Patient's Self Care Capacity Parents   Comments pts mother is 91yr and not able to assist   Prior Level of ADL Function   Self Feeding Independent   Grooming / Hygiene Independent   Bathing Independent   Dressing Independent   Toileting Independent   Prior Level of IADL Function   Medication Management Independent   Laundry Independent   Kitchen " Mobility Independent   Finances Independent   Home Management Independent   Shopping Independent   Prior Level Of Mobility Independent Without Device in Home   History of Falls   History of Falls Yes   Precautions   Precautions Fall Risk   Comments PD   Pain 0 - 10 Group   Therapist Pain Assessment 0;Nurse Notified   Cognition    Cognition / Consciousness X   Speech/ Communication Delayed Responses   Level of Consciousness Alert   Safety Awareness Impaired;Impulsive   Attention Impaired   Initiation Impaired   Comments Slow to respond very poor safety awarness high fall risk   Passive ROM Upper Body   Passive ROM Upper Body WDL   Active ROM Upper Body   Active ROM Upper Body  WDL   Strength Upper Body   Upper Body Strength  X   Gross Strength Generalized Weakness, Equal Bilaterally.    Sensation Upper Body   Upper Extremity Sensation  Not Tested   Upper Body Muscle Tone   Upper Body Muscle Tone  WDL   Neurological Concerns   Neurological Concerns No   Coordination Upper Body   Coordination X   Fine Motor Coordination tremors   Balance Assessment   Sitting Balance (Static) Fair -   Sitting Balance (Dynamic) Fair -   Standing Balance (Static) Fair -   Standing Balance (Dynamic) Poor +   Weight Shift Sitting Fair   Weight Shift Standing Poor   Comments w/fww   Bed Mobility    Supine to Sit Standby Assist   Sit to Supine Standby Assist   Scooting Standby Assist   ADL Assessment   Grooming Moderate Assist;Standing   Upper Body Dressing Minimal Assist   Lower Body Dressing Moderate Assist   Toileting Moderate Assist   Comments poor balance   How much help from another person does the patient currently need...   6 Clicks Daily Activity Score 15   Functional Mobility   Sit to Stand Minimal Assist   Bed, Chair, Wheelchair Transfer Minimal Assist   Toilet Transfers Minimal Assist   Mobility walking in room w/fww and min a several LOB   Visual Perception   Visual Perception  Not Tested   Edema / Skin Assessment   Edema / Skin   Not Assessed   Activity Tolerance   Comments no c/o pain or fatigue   Patient / Family Goals   Patient / Family Goal #1 to get moving   Short Term Goals   Short Term Goal # 1 pt will complete toielt txf w/spv   Short Term Goal # 2 pt will compelte LB dressing w/spv   Short Term Goal # 3 pt will complete grooming standing at sink w/spv   Education Group   Role of Occupational Therapist Patient Response Patient;Acceptance   Problem List   Problem List Decreased Active Daily Living Skills;Decreased Upper Extremity Strength Right;Decreased Upper Extremity Strength Left;Decreased Functional Mobility;Safety Awareness Deficits / Cognition;Decreased Activity Tolerance;Impaired Coordination Right Upper Extremity;Impaired Coordination Left Upper Extremity;Impaired Postural Control / Balance

## 2022-07-17 NOTE — PROGRESS NOTES
Report received.  Assumed care of pt.  Pt in bed, resting. Family at bedside. AOx4, responds appropriately. Pain 0/10.  Denies SOB, n/v.  Reviewed POC with pt and family, call light and belongings within reach, treaded slipper socks on, bed alarm on, bed in lowest locked position.

## 2022-07-17 NOTE — CARE PLAN
The patient is Watcher - Medium risk of patient condition declining or worsening    Shift Goals  Clinical Goals: cath lab monday, rest,  Patient Goals: rest  Family Goals: rest, cath lab monday    Progress made toward(s) clinical / shift goals:  Pt able to rest this shift, family understands and agrees with POC    Patient is not progressing towards the following goals:

## 2022-07-17 NOTE — THERAPY
"Physical Therapy   Daily Treatment     Patient Name: Bladimir Mitchell  Age:  66 y.o., Sex:  male  Medical Record #: [id number]  Today's Date: 7/17/2022     Precautions  Precautions: Fall Risk  Comments: PD    Assessment    Pt seen for follow up PT tx. Mother at bedside during treatment and reporting he has not mobilized much since being in the hospital. Today, pt required min assist for STS and min assist ambulating 40ft with FWW. 2 LOB noted during gait requiring assistance to maintain RON and prevent falls. Pt is functioning below his baseline and would benefit from post acute placement.     Plan    Continue current treatment plan.    DC Equipment Recommendations: Unable to determine at this time  Discharge Recommendations: Recommend post-acute placement for additional physical therapy services prior to discharge home      Subjective    \"That walker is for my mom\"       07/17/22 1651   Cognition    Speech/ Communication Delayed Responses   Level of Consciousness Alert   Safety Awareness Impaired   Attention Impaired   Initiation Impaired   Comments cooperative but required significant cues   Other Treatments   Other Treatments Provided mother at bedside   Balance   Sitting Balance (Static) Fair   Sitting Balance (Dynamic) Fair -   Standing Balance (Static) Fair -   Standing Balance (Dynamic) Poor +   Weight Shift Sitting Fair   Weight Shift Standing Poor   Skilled Intervention Verbal Cuing;Compensatory Strategies   Comments FWW   Gait Analysis   Gait Level Of Assist Minimal Assist   Assistive Device Front Wheel Walker   Distance (Feet) 40   # of Times Distance was Traveled 1   Deviation Shuffled Gait;Bradykinetic;Decreased Base Of Support;Decreased Toe Off;Decreased Heel Strike   Weight Bearing Status No restrictions   Skilled Intervention Verbal Cuing;Compensatory Strategies   Comments 2 LOB requiring assistance to maintain RON   Bed Mobility    Comments in chair pre and post   Functional Mobility   Sit to " Stand Minimal Assist   Bed, Chair, Wheelchair Transfer Minimal Assist   Transfer Method Stand Step   Mobility in room and hallway with FWW   Skilled Intervention Verbal Cuing;Compensatory Strategies   Short Term Goals    Short Term Goal # 1 Pt will complete bed mobility without use of bed features with SPV within 6 visits   Goal Outcome # 1 goal not met   Short Term Goal # 2 Pt will complete functional transfers with SPV with LRAD within 6 visits to improve mobility   Goal Outcome # 2 Goal not met   Short Term Goal # 3 Pt will ambulate 200 feet with LRAD with SBA within 6 visits to improve mobility   Goal Outcome # 3 Goal not met   Short Term Goal # 4 Pt will be able to ascend/descend 2 steps to allow for community reintegration with SBA with LRAD within 6 visits   Goal Outcome # 4 Goal not met   Anticipated Discharge Equipment and Recommendations   DC Equipment Recommendations Unable to determine at this time   Discharge Recommendations Recommend post-acute placement for additional physical therapy services prior to discharge home

## 2022-07-18 PROCEDURE — A9270 NON-COVERED ITEM OR SERVICE: HCPCS

## 2022-07-18 PROCEDURE — 4A10X4Z MONITORING OF CENTRAL NERVOUS ELECTRICAL ACTIVITY, EXTERNAL APPROACH: ICD-10-PCS | Performed by: PSYCHIATRY & NEUROLOGY

## 2022-07-18 PROCEDURE — A9270 NON-COVERED ITEM OR SERVICE: HCPCS | Performed by: INTERNAL MEDICINE

## 2022-07-18 PROCEDURE — 95720 EEG PHY/QHP EA INCR W/VEEG: CPT | Performed by: PSYCHIATRY & NEUROLOGY

## 2022-07-18 PROCEDURE — 95700 EEG CONT REC W/VID EEG TECH: CPT | Performed by: PSYCHIATRY & NEUROLOGY

## 2022-07-18 PROCEDURE — 700102 HCHG RX REV CODE 250 W/ 637 OVERRIDE(OP): Performed by: INTERNAL MEDICINE

## 2022-07-18 PROCEDURE — 700111 HCHG RX REV CODE 636 W/ 250 OVERRIDE (IP): Performed by: STUDENT IN AN ORGANIZED HEALTH CARE EDUCATION/TRAINING PROGRAM

## 2022-07-18 PROCEDURE — 700102 HCHG RX REV CODE 250 W/ 637 OVERRIDE(OP)

## 2022-07-18 PROCEDURE — 700102 HCHG RX REV CODE 250 W/ 637 OVERRIDE(OP): Performed by: NURSE PRACTITIONER

## 2022-07-18 PROCEDURE — A9270 NON-COVERED ITEM OR SERVICE: HCPCS | Performed by: NURSE PRACTITIONER

## 2022-07-18 PROCEDURE — 770001 HCHG ROOM/CARE - MED/SURG/GYN PRIV*

## 2022-07-18 PROCEDURE — 95714 VEEG EA 12-26 HR UNMNTR: CPT | Performed by: PSYCHIATRY & NEUROLOGY

## 2022-07-18 PROCEDURE — 99233 SBSQ HOSP IP/OBS HIGH 50: CPT | Performed by: STUDENT IN AN ORGANIZED HEALTH CARE EDUCATION/TRAINING PROGRAM

## 2022-07-18 RX ORDER — ENOXAPARIN SODIUM 100 MG/ML
40 INJECTION SUBCUTANEOUS DAILY
Status: DISCONTINUED | OUTPATIENT
Start: 2022-07-18 | End: 2022-07-20 | Stop reason: HOSPADM

## 2022-07-18 RX ADMIN — ENOXAPARIN SODIUM 40 MG: 40 INJECTION SUBCUTANEOUS at 11:46

## 2022-07-18 RX ADMIN — SENNOSIDES AND DOCUSATE SODIUM 2 TABLET: 50; 8.6 TABLET ORAL at 17:32

## 2022-07-18 RX ADMIN — LEVETIRACETAM 500 MG: 500 TABLET, FILM COATED ORAL at 08:49

## 2022-07-18 RX ADMIN — LEVETIRACETAM 500 MG: 500 TABLET, FILM COATED ORAL at 20:18

## 2022-07-18 RX ADMIN — SENNOSIDES AND DOCUSATE SODIUM 2 TABLET: 50; 8.6 TABLET ORAL at 05:26

## 2022-07-18 RX ADMIN — LOSARTAN POTASSIUM 100 MG: 50 TABLET, FILM COATED ORAL at 05:26

## 2022-07-18 RX ADMIN — AMLODIPINE BESYLATE 5 MG: 5 TABLET ORAL at 05:26

## 2022-07-18 RX ADMIN — METOPROLOL TARTRATE 25 MG: 25 TABLET, FILM COATED ORAL at 17:32

## 2022-07-18 RX ADMIN — CARBIDOPA AND LEVODOPA 1 TABLET: 25; 100 TABLET, EXTENDED RELEASE ORAL at 11:46

## 2022-07-18 RX ADMIN — CARBIDOPA AND LEVODOPA 1 TABLET: 25; 100 TABLET, EXTENDED RELEASE ORAL at 08:49

## 2022-07-18 RX ADMIN — CARBIDOPA AND LEVODOPA 1 TABLET: 25; 100 TABLET, EXTENDED RELEASE ORAL at 20:18

## 2022-07-18 RX ADMIN — CARBIDOPA AND LEVODOPA 1 TABLET: 25; 100 TABLET, EXTENDED RELEASE ORAL at 17:33

## 2022-07-18 RX ADMIN — ASPIRIN 81 MG: 81 TABLET, COATED ORAL at 05:26

## 2022-07-18 RX ADMIN — ATORVASTATIN CALCIUM 40 MG: 40 TABLET, FILM COATED ORAL at 20:18

## 2022-07-18 RX ADMIN — METOPROLOL TARTRATE 25 MG: 25 TABLET, FILM COATED ORAL at 05:26

## 2022-07-18 ASSESSMENT — PAIN DESCRIPTION - PAIN TYPE
TYPE: ACUTE PAIN

## 2022-07-18 NOTE — EEG PROGRESS NOTE
Pt is hooked to routine 24hr EEG . Tech used CT compatible leads, these leads are NOT to be used in MRI.   RN Coached on how to push red button and to describe Symptoms to camera on machine if pt. Is having a Sz.

## 2022-07-18 NOTE — PROCEDURES
VIDEO ELECTROENCEPHALOGRAM  REPORT      Referring provider: Dr. Garrison    DOS:   7/18/2022      INDICATION:  Bladimir Mitchell 66 y.o. male presenting with tremor     CURRENT ANTIEPILEPTIC REGIMEN:none      TECHNIQUE: A 30-channel, 20 hrs and 21 minutes video electroencephalogram (VEEG) was performed in accordance with the international 10-20 system. This digital study was reviewed in bipolar and referential montages. The recording examined the patient during wakeful, drowsy and sleep states.     The EEG was set up and taken down by a Neurodiagnostic technologist who performed education to patient and staff.     A minimum but not limited to 23 electrodes and 23 channel recording was setup and performed by Neurodiagnostic technologist.    Impedances, electrode integrity, and technical impressions were documented a minimum of every 2-24 hour period by a Neurodiagnostic Technologist and reviewed by Interpreting physician.     There was supervised withdrawal of the following medications: n/a    ACTIVATION PROCEDURES:   hyperventilation was not performed    Intermittent Photic stimulation was performed in a stepwise fashion from 1 to 30 Hz and elicited no photic driving response.     DESCRIPTION OF THE RECORD:  During wakefulness, the background consisted of diffuse theta range slowing at 7Hz. No well-formed posterior dominant rhythm or anterior-posterior gradient was seen. Bursts of rhythmic delta in triphasic morphology was noted.  With drowsiness, there was attenuation of the background theta activity and increased fast frequency. During the sleep state, background shows diffuse high-amplitude 4-5 Hz delta activity.  Symmetrical high-amplitude sleep spindles and vertex sharp activities were seen in the central leads.    ICTAL AND/OR INTERICTAL FINDINGS:   No focal or generalized epileptiform activity was noted. No regional slowing was seen during this study.  No seizures were recorded during the study.      EVENT(S): frequent hands tremor noted, more on the right. No ictal EEG correlate seen.     EKG: sampling review of EKG recording demonstrated sinus rhythm.       INTERPRETATION:   This is an abnormal video EEG recording in the awake ,drowsy and sleep states.  1) The diffuse background slowing and triphasic wave are consistent with mild encephalopathy.    2) No epileptiform discharges or seizures were seen.    3)Frequent hand tremors were noted, no ictal EEG correlate. No seizure seen.  Clinical correlation is recommended.      Dewey Florence MD  Diplomate, American Board of Psychiatry and Neurology   Diplomate, American Board of Psychiatry and Neurology in Epilepsy

## 2022-07-18 NOTE — CARE PLAN
The patient is Stable - Low risk of patient condition declining or worsening    Shift Goals  Clinical Goals: Rest; Mobility  Patient Goals: rest  Family Goals: rest, cath lab monday    Progress made toward(s) clinical / shift goals:  patient turns self in bed, utilizing walker for ambulation.       Problem: Skin Integrity  Goal: Skin integrity is maintained or improved  Outcome: Progressing     Problem: Fall Risk  Goal: Patient will remain free from falls  Outcome: Progressing       Patient is not progressing towards the following goals:

## 2022-07-18 NOTE — TELEPHONE ENCOUNTER
Pls let mom know- thanks- and I am looking at the hospital records.    Consent (Scalp)/Introductory Paragraph: The rationale for Mohs was explained to the patient and consent was obtained. The risks, benefits and alternatives to therapy were discussed in detail. Specifically, the risks of changes in hair growth pattern secondary to repair, infection, scarring, bleeding, prolonged wound healing, incomplete removal, allergy to anesthesia, nerve injury and recurrence were addressed. Prior to the procedure, the treatment site was clearly identified and confirmed by the patient. All components of Universal Protocol/PAUSE Rule completed.

## 2022-07-18 NOTE — DISCHARGE PLANNING
TCN following. HTH/SCP chart review completed. Collaborated with YUMIKO Burns. Current disposition is SNF. Referrals have been placed and several SNFs are accepting. No additional TCN needs at this time.     Previously completed:  - PT/OT recommending post-acute.   - Choice forms: HH, SNF  - [facility] introduced (Y), referral (sent).

## 2022-07-18 NOTE — PROGRESS NOTES
Monitor Summary    Rhythm: SB/SR  HR: 59-79  Ectopy: rPVC, rPAC, a-flutter, run vtach  Measurements: .15/.12/.45

## 2022-07-18 NOTE — PROGRESS NOTES
[facility] Medicine Daily Progress Note    Date of Service  7/18/2022  Chief Complaint  Bladimir Mitchell is a 66 y.o. male admitted 7/14/2022 with shortness of breath     Hospital Course     Patient 66-year-old male with past medical history of Parkinson's disease, hypokalemia, hypertension who presented 7/14 for concerns with shortness of breath.  Admitted for further work upNoted a significant elevated D-dimer.  CT angio chest negative for PE.  DVT study negative .Stress test noted with no reversible ischemia.Small sized infarct apical inferior, mid inferior segments.  Cardiology recommending discharge patient on aspirin and metoprolol and follow-up with outpatient.   As per cardiology no need of therapeutic intervention is present does not have a flutter.  Evaluate neurology for worsening tremor and possible seizure.  EEG shows stable non specific encephalopathy with no active seizure.  Discussed with neurology 24 video hr  EEG was ordered as there is concern for ongoing seizure.    Interval Problem Update  7/15/2022  Vitals remained stable.  Patient was AOx3 morning around.  He have intermittent confusion.  CT head ordered for further evaluation.  Neurochecks every 4 hours  Noted a significant elevated D-dimer.  CT angio chest negative for PE.  Pending DVT study  Initial EKG noted a flutter, repeated EKG noted in sinus rhythm.  Will rule out COVID-19 pneumonia given significant D-dimer and shortness of breath  Discussed with cardiology Dr. Gill , plan to get nuclear study to rule out underlying CAD.    Need home O2 evaluation  PT/OT evaluation     7/16/2022  Vitals remained stable.  Worsening tremor.  CT is unremarkable.  DVT study negative COVID PCR negative.  PT/OT recommending home with home health.  Stress test noted with no reversible ischemia.Small sized infarct apical inferior, mid inferior segments.  Cardiology recommending discharge patient on aspirin and metoprolol and follow-up with  outpatient.     EEG ordered.  Neurology was consulted for worsening tremor.    As per patient's mother she went to be able to take care of him.  Likely need  follow-up to assist with discharge plan      7/17/2022  Vitals remained stable  Persistent tremor unchanged.  PT/OT recommending SNF placement.  Patient agreed to go to SNF if needed.  EEG shows stable non specific encephalopathy with no active seizure.  Patient's mother concerned about ongoing seizure, as patient had seizure-like episode every other weeks when he have episode of confusion.  Discussed with neurology.  24-hour video EEG monitoring ordered   assisting with placement     7/18/2022   Vital stable  Continue to have generalized tremor  Patient will need 24 video EEG monitoring for suspected seizure  Patient will need SNF.   assisting     I have discussed this patient's plan of care and discharge plan at IDT rounds today with Case Management, Nursing, Nursing leadership, and other members of the IDT team.     Consultants/Specialty  cardiology     Code Status  Full Code     Disposition  Patient is not medically cleared for discharge.   Anticipate discharge to to home with close outpatient follow-up.  I have placed the appropriate orders for post-discharge needs.     Review of Systems  Review of Systems   Constitutional: Negative for fever.   HENT: Negative for hearing loss.    Eyes: Negative for blurred vision.   Respiratory: Positive for shortness of breath. Negative for cough.    Cardiovascular: Negative for chest pain.   Gastrointestinal: Negative for nausea and vomiting.   Genitourinary: Negative for dysuria.   Musculoskeletal: Negative for myalgias.   Skin: Negative for rash.   Neurological: Negative for dizziness.   Endo/Heme/Allergies: Does not bruise/bleed easily.         Physical Exam  Temp:  [36.5 °C (97.7 °F)-37 °C (98.6 °F)] 36.5 °C (97.7 °F)  Pulse:  [72-92] 82  Resp:  [18-32] 20  BP: (111-139)/(65-83)  111/65  SpO2:  [93 %-99 %] 93 %     Physical Exam  Constitutional:       General: He is not in acute distress.  HENT:      Head: Normocephalic and atraumatic.      Right Ear: Tympanic membrane normal.      Left Ear: Tympanic membrane normal.      Nose: Nose normal.      Mouth/Throat:      Mouth: Mucous membranes are moist.   Eyes:      Extraocular Movements: Extraocular movements intact.      Pupils: Pupils are equal, round, and reactive to light.   Cardiovascular:      Rate and Rhythm: Normal rate and regular rhythm.      Pulses: Normal pulses.   Pulmonary:      Effort: Pulmonary effort is normal. No respiratory distress.   Abdominal:      General: Abdomen is flat. There is no distension.   Musculoskeletal:      Cervical back: Normal range of motion.      Right lower leg: No edema.      Left lower leg: No edema.   Skin:     General: Skin is warm.   Neurological:      General: No focal deficit present.  Tremor noted      Mental Status: He is alert and oriented to person, place, and time. Mental status is at baseline.   Psychiatric:         Mood and Affect: Mood normal.   Fluids  No intake or output data in the 24 hours ending 07/18/22 1020    Laboratory      Recent Labs     07/15/22  1914   SODIUM 141   POTASSIUM 3.6   CHLORIDE 108   CO2 22   GLUCOSE 104*   BUN 15   CREATININE 0.71   CALCIUM 9.2                   Imaging  CT-HEAD W/O   Final Result      1.  Cerebral atrophy.      2.  Otherwise, Head CT without contrast within normal limits. No evidence of acute cerebral infarction, hemorrhage or mass lesion.         NM-CARDIAC STRESS TEST   Final Result      US-EXTREMITY VENOUS LOWER BILAT   Final Result      CT-CTA CHEST PULMONARY ARTERY W/ RECONS   Final Result      1.  No evidence of pulmonary embolism to the level of the segmental arteries.   2.  No acute cardiopulmonary abnormality.   3.  Atherosclerotic changes. Mild dilation of the ascending aorta, measuring 4.1 cm.   4.  Coronary artery atherosclerotic  disease.   5.  Two 4 mm right upper lobe pulmonary nodules. Follow-up recommendations below.      Low Risk: No routine follow-up      High Risk: Optional CT at 12 months      Comments: Use most suspicious nodule as guide to management. Follow-up intervals may vary according to size and risk.      Low Risk - Minimal or absent history of smoking and of other known risk factors.      High Risk - History of smoking or of other known risk factors.      Note: These recommendations do not apply to lung cancer screening, patients with immunosuppression, or patients with known primary cancer.      Fleischner Society 2017 Guidelines for Management of Incidentally Detected Pulmonary Nodules in Adults      EC-ECHOCARDIOGRAM COMPLETE W/O CONT   Final Result      DX-CHEST-PORTABLE (1 VIEW)   Final Result      1.  No acute cardiac or pulmonary abnormalities are identified.           Assessment/Plan  * Tremor- (present on admission)  Assessment & Plan  Worsening tremor likely in setting of Parkinson , unable to rule out seizure  Sinemet dose adjusted by neurologist and recommending deep brain stimulaton as outpatient  Initial EEG unremarkable for ongoing seizure  24-hour video EEG ordered      AMS (altered mental status)  Assessment & Plan  AMS, likely delirium   CT head unremarkable  UA negative    Chest pressure  Assessment & Plan  Troponin x 3 trended flat  EKG with nonsignificant ST changes  Echo unremarkable  Stress test noted with no reversible ischemia.Small sized infarct apical inferior, mid inferior segments.  Cardiology recommending discharge patient on aspirin and metoprolol and follow-up with outpatient.        Positive D dimer  Assessment & Plan  Noted a significant elevated D-dimer.  CT angio chest negative for PE.    Initial EKG noted a flutter, repeated EKG noted in sinus rhythm.  Will rule out COVID-19 negative  DVT study negative    Dyspnea- (present on admission)  Assessment & Plan  Nonfocal dyspnea, chronic,  intermittent, worse on exertion  - Currently on room air  - rule out covid     Atrial flutter (HCC)- (present on admission)  Assessment & Plan  Confirmed with cardiology no a flutter noted on initial  ekg   Xarelto discontinued    Hypokalemia- (present on admission)  Assessment & Plan  Monitor and replace as needed, appears chronic    Parkinson's disease (HCC)- (present on admission)  Assessment & Plan  Continue home Sinemet    Advanced care planning/counseling discussion  Assessment & Plan  I spent 17 minutes at bedside with patient discussing work-up, results, diagnosis, prognosis.  CODE STATUS discussed with patient and wants to be Full code.    Essential hypertension- (present on admission)  Assessment & Plan  Blood pressure elevated on admission  - Continue home losartan  - If blood pressure still elevated can consider IV as needed       VTE prophylaxis: SCDs/TEDs and enoxaparin ppx    I have performed a physical exam and reviewed and updated ROS and Plan today (7/18/2022). In review of yesterday's note (7/17/2022), there are no changes except as documented above.

## 2022-07-19 LAB
ALBUMIN SERPL BCP-MCNC: 3.6 G/DL (ref 3.2–4.9)
ALBUMIN/GLOB SERPL: 1.3 G/DL
ALP SERPL-CCNC: 54 U/L (ref 30–99)
ALT SERPL-CCNC: <5 U/L (ref 2–50)
ANION GAP SERPL CALC-SCNC: 12 MMOL/L (ref 7–16)
AST SERPL-CCNC: 17 U/L (ref 12–45)
BILIRUB SERPL-MCNC: 2 MG/DL (ref 0.1–1.5)
BUN SERPL-MCNC: 14 MG/DL (ref 8–22)
CALCIUM SERPL-MCNC: 8.7 MG/DL (ref 8.5–10.5)
CHLORIDE SERPL-SCNC: 107 MMOL/L (ref 96–112)
CO2 SERPL-SCNC: 22 MMOL/L (ref 20–33)
CREAT SERPL-MCNC: 0.77 MG/DL (ref 0.5–1.4)
GFR SERPLBLD CREATININE-BSD FMLA CKD-EPI: 99 ML/MIN/1.73 M 2
GLOBULIN SER CALC-MCNC: 2.8 G/DL (ref 1.9–3.5)
GLUCOSE SERPL-MCNC: 102 MG/DL (ref 65–99)
MAGNESIUM SERPL-MCNC: 1.9 MG/DL (ref 1.5–2.5)
POTASSIUM SERPL-SCNC: 3 MMOL/L (ref 3.6–5.5)
PROT SERPL-MCNC: 6.4 G/DL (ref 6–8.2)
SODIUM SERPL-SCNC: 141 MMOL/L (ref 135–145)

## 2022-07-19 PROCEDURE — A9270 NON-COVERED ITEM OR SERVICE: HCPCS

## 2022-07-19 PROCEDURE — 80053 COMPREHEN METABOLIC PANEL: CPT

## 2022-07-19 PROCEDURE — 700102 HCHG RX REV CODE 250 W/ 637 OVERRIDE(OP)

## 2022-07-19 PROCEDURE — A9270 NON-COVERED ITEM OR SERVICE: HCPCS | Performed by: STUDENT IN AN ORGANIZED HEALTH CARE EDUCATION/TRAINING PROGRAM

## 2022-07-19 PROCEDURE — 700102 HCHG RX REV CODE 250 W/ 637 OVERRIDE(OP): Performed by: NURSE PRACTITIONER

## 2022-07-19 PROCEDURE — 97116 GAIT TRAINING THERAPY: CPT

## 2022-07-19 PROCEDURE — A9270 NON-COVERED ITEM OR SERVICE: HCPCS | Performed by: NURSE PRACTITIONER

## 2022-07-19 PROCEDURE — 700111 HCHG RX REV CODE 636 W/ 250 OVERRIDE (IP): Performed by: STUDENT IN AN ORGANIZED HEALTH CARE EDUCATION/TRAINING PROGRAM

## 2022-07-19 PROCEDURE — 700102 HCHG RX REV CODE 250 W/ 637 OVERRIDE(OP): Performed by: STUDENT IN AN ORGANIZED HEALTH CARE EDUCATION/TRAINING PROGRAM

## 2022-07-19 PROCEDURE — 83735 ASSAY OF MAGNESIUM: CPT

## 2022-07-19 PROCEDURE — 99232 SBSQ HOSP IP/OBS MODERATE 35: CPT | Performed by: NURSE PRACTITIONER

## 2022-07-19 PROCEDURE — 770001 HCHG ROOM/CARE - MED/SURG/GYN PRIV*

## 2022-07-19 PROCEDURE — 97112 NEUROMUSCULAR REEDUCATION: CPT

## 2022-07-19 PROCEDURE — 97530 THERAPEUTIC ACTIVITIES: CPT

## 2022-07-19 PROCEDURE — A9270 NON-COVERED ITEM OR SERVICE: HCPCS | Performed by: INTERNAL MEDICINE

## 2022-07-19 PROCEDURE — 700102 HCHG RX REV CODE 250 W/ 637 OVERRIDE(OP): Performed by: INTERNAL MEDICINE

## 2022-07-19 RX ORDER — CHOLECALCIFEROL (VITAMIN D3) 125 MCG
5 CAPSULE ORAL NIGHTLY
Status: DISCONTINUED | OUTPATIENT
Start: 2022-07-19 | End: 2022-07-20 | Stop reason: HOSPADM

## 2022-07-19 RX ORDER — POTASSIUM CHLORIDE 20 MEQ/1
40 TABLET, EXTENDED RELEASE ORAL 2 TIMES DAILY
Status: DISCONTINUED | OUTPATIENT
Start: 2022-07-19 | End: 2022-07-20 | Stop reason: HOSPADM

## 2022-07-19 RX ORDER — ACETAMINOPHEN 500 MG
500 TABLET ORAL EVERY 6 HOURS PRN
Status: DISCONTINUED | OUTPATIENT
Start: 2022-07-19 | End: 2022-07-20 | Stop reason: HOSPADM

## 2022-07-19 RX ADMIN — LOSARTAN POTASSIUM 100 MG: 50 TABLET, FILM COATED ORAL at 05:33

## 2022-07-19 RX ADMIN — SENNOSIDES AND DOCUSATE SODIUM 2 TABLET: 50; 8.6 TABLET ORAL at 18:30

## 2022-07-19 RX ADMIN — METOPROLOL TARTRATE 25 MG: 25 TABLET, FILM COATED ORAL at 05:33

## 2022-07-19 RX ADMIN — ATORVASTATIN CALCIUM 40 MG: 40 TABLET, FILM COATED ORAL at 20:43

## 2022-07-19 RX ADMIN — CARBIDOPA AND LEVODOPA 1 TABLET: 25; 100 TABLET, EXTENDED RELEASE ORAL at 09:09

## 2022-07-19 RX ADMIN — CARBIDOPA AND LEVODOPA 1 TABLET: 25; 100 TABLET, EXTENDED RELEASE ORAL at 20:43

## 2022-07-19 RX ADMIN — METOPROLOL TARTRATE 25 MG: 25 TABLET, FILM COATED ORAL at 18:32

## 2022-07-19 RX ADMIN — ENOXAPARIN SODIUM 40 MG: 40 INJECTION SUBCUTANEOUS at 18:29

## 2022-07-19 RX ADMIN — CARBIDOPA AND LEVODOPA 1 TABLET: 25; 100 TABLET, EXTENDED RELEASE ORAL at 18:30

## 2022-07-19 RX ADMIN — AMLODIPINE BESYLATE 5 MG: 5 TABLET ORAL at 05:33

## 2022-07-19 RX ADMIN — ASPIRIN 81 MG: 81 TABLET, COATED ORAL at 05:33

## 2022-07-19 RX ADMIN — LEVETIRACETAM 500 MG: 500 TABLET, FILM COATED ORAL at 09:09

## 2022-07-19 RX ADMIN — SENNOSIDES AND DOCUSATE SODIUM 2 TABLET: 50; 8.6 TABLET ORAL at 05:32

## 2022-07-19 RX ADMIN — LEVETIRACETAM 500 MG: 500 TABLET, FILM COATED ORAL at 20:43

## 2022-07-19 RX ADMIN — POTASSIUM CHLORIDE 40 MEQ: 1500 TABLET, EXTENDED RELEASE ORAL at 18:41

## 2022-07-19 RX ADMIN — CARBIDOPA AND LEVODOPA 1 TABLET: 25; 100 TABLET, EXTENDED RELEASE ORAL at 14:00

## 2022-07-19 RX ADMIN — POTASSIUM CHLORIDE 40 MEQ: 1500 TABLET, EXTENDED RELEASE ORAL at 09:09

## 2022-07-19 RX ADMIN — Medication 5 MG: at 20:43

## 2022-07-19 ASSESSMENT — GAIT ASSESSMENTS
ASSISTIVE DEVICE: FRONT WHEEL WALKER
GAIT LEVEL OF ASSIST: MINIMAL ASSIST
DISTANCE (FEET): 110
DEVIATION: BRADYKINETIC;SHUFFLED GAIT;DECREASED BASE OF SUPPORT

## 2022-07-19 ASSESSMENT — COGNITIVE AND FUNCTIONAL STATUS - GENERAL
MOVING FROM LYING ON BACK TO SITTING ON SIDE OF FLAT BED: UNABLE
MOBILITY SCORE: 11
SUGGESTED CMS G CODE MODIFIER MOBILITY: CL
STANDING UP FROM CHAIR USING ARMS: A LOT
CLIMB 3 TO 5 STEPS WITH RAILING: A LOT
WALKING IN HOSPITAL ROOM: A LITTLE
MOVING TO AND FROM BED TO CHAIR: UNABLE
TURNING FROM BACK TO SIDE WHILE IN FLAT BAD: A LOT

## 2022-07-19 ASSESSMENT — PAIN DESCRIPTION - PAIN TYPE
TYPE: ACUTE PAIN
TYPE: ACUTE PAIN

## 2022-07-19 NOTE — PROGRESS NOTES
Per tele monitor patient had a 5 beat run of V-tach. Patient presents with no chest pain or SOB. On call physician notified. New orders given.

## 2022-07-19 NOTE — PROGRESS NOTES
Assumed care from Bryan HUTSON  Patient was resting in bed comfortably. Patient was A&O x 4. Safety precautions in place. Patient had no concerns at this time and educated on how to use the call light. Fresh linen and personal belongings within reach. 24 hour EEG running. Will continue to do hourly monitoring.

## 2022-07-19 NOTE — PROGRESS NOTES
Tele strip per monitor room  SR/SB  HR 40's-60's  Ectopy-PVC's, PAC's, Couplets  01:67 5 beat run V-tach   OR-.19  QRS-.10  QT-.40

## 2022-07-19 NOTE — DISCHARGE PLANNING
"TCN following. HTH/SCP chart review completed. Pt is not medically cleared for discharge. Per recent PN \"pending correction of hypokalemia.\" Pt has many accepting facilities. No additional TCN needs identified at this time.     Previously completed:  - PT/OT recommending post-acute.   - Choice forms: HH, SNF  - [facility] introduced (Y), referral (sent).     "

## 2022-07-19 NOTE — HOSPITAL COURSE
Mr. Bladimir Mitchell is a 66-year-old male with a PMHx of Parkinson's disease, hypokalemia, hypertension, who presented on 7/14/2022 due to concerns of shortness of breath.    Patient's shortness of breath was noted to be intermittent and worse with exertion associated but not associated with chest pain, dizziness, or lightheadedness.  Patient reports that shortness of breath has been a chronic issue that he has been dealing with, but has not gotten worse over the previous episodes.  On examination, patient denied shortness of breath.  Patient was brought to the hospital by his mother due to concerns of worsening condition.    In ER, patient noted to have an elevated blood pressure of 177/116, 100% on room air.  Notable lab findings noted WBC 5.7, sodium 141, potassium 3.0, troponin 20.  Initial EKG noted atrial flutter with 4:1 conduction rate.  Patient was initiated on anticoagulation, Xarelto due to new onset of atrial flutter.  An echocardiogram was then ordered which noted grossly LV systolic function is normal with normal mitral and aortic valves, right valves not seen, with normal IVC with inspiratory collapse.    Overnight on 7/15, patient noted to have an increasing shortness of breath of which a D-dimer was obtained and noted to be significantly elevated.  CTA chest was then followed of which this was negative for pulmonary embolism.  DVT study was initiated and repeat EKG was obtained which noted patient to be back in sinus rhythm.  Cardiology was also consulted, Dr. Noemy Dhillon of which patient was recommended to undergo a nuclear study to rule out any underlying CAD.  Patient evaluated for home O2 needs along with PT/OT.    Per cardiology recommendations, consult neurology for further recommendations.  Patient does not currently have atrial flutter and Xarelto was discontinued on 7/16.  Patient also not recommended for a cardiac catheterization at this time.  Rigorous blood pressure control with goal of  less than or equal to 130/80.  Patient will follow up with cardiology as an outpatient and cardiology had signed off on 7/16.    Per neurology, patient will be trialed on Sinemet.  An EEG was also recommended to follow suit due to patient increased tremors from history of Parkinson's.  EEG interpretation noted abnormal video EEG recording in the awake, drowsy, and sleep states with mild to moderate background slowing suggestive of a nonspecific encephalopathy, no persistent focal asymmetry seen, no epileptiform discharges or other epileptiform phenomena seen, no seizures.    Physical therapy evaluated patient of which documented postacute placement.  SNF referral placed.

## 2022-07-19 NOTE — PROGRESS NOTES
Monitor Summary:  SB, SR 60-76  .18/.10/.43  With PVC, trip, trigem and PAC.   Pt has a history of a flutter

## 2022-07-19 NOTE — PROGRESS NOTES
Hospital Medicine Daily Progress Note    Date of Service  7/19/2022    Chief Complaint  Bladimir Mitchell is a 66 y.o. male admitted 7/14/2022 with shortness of breath    Hospital Course  Mr. Bladimir Mitchell is a 66-year-old male with a PMHx of Parkinson's disease, hypokalemia, hypertension, who presented on 7/14/2022 due to concerns of shortness of breath.    Patient's shortness of breath was noted to be intermittent and worse with exertion associated but not associated with chest pain, dizziness, or lightheadedness.  Patient reports that shortness of breath has been a chronic issue that he has been dealing with, but has not gotten worse over the previous episodes.  On examination, patient denied shortness of breath.  Patient was brought to the hospital by his mother due to concerns of worsening condition.    In ER, patient noted to have an elevated blood pressure of 177/116, 100% on room air.  Notable lab findings noted WBC 5.7, sodium 141, potassium 3.0, troponin 20.  Initial EKG noted atrial flutter with 4:1 conduction rate.  Patient was initiated on anticoagulation, Xarelto due to new onset of atrial flutter.  An echocardiogram was then ordered which noted grossly LV systolic function is normal with normal mitral and aortic valves, right valves not seen, with normal IVC with inspiratory collapse.    Overnight on 7/15, patient noted to have an increasing shortness of breath of which a D-dimer was obtained and noted to be significantly elevated.  CTA chest was then followed of which this was negative for pulmonary embolism.  DVT study was initiated and repeat EKG was obtained which noted patient to be back in sinus rhythm.  Cardiology was also consulted, Dr. Noemy Dhillon of which patient was recommended to undergo a nuclear study to rule out any underlying CAD.  Patient evaluated for home O2 needs along with PT/OT.    Per cardiology recommendations, consult neurology for further recommendations.  Patient does  not currently have atrial flutter and Xarelto was discontinued on 7/16.  Patient also not recommended for a cardiac catheterization at this time.  Rigorous blood pressure control with goal of less than or equal to 130/80.  Patient will follow up with cardiology as an outpatient and cardiology had signed off on 7/16.    Per neurology, patient will be trialed on Sinemet.  An EEG was also recommended to follow suit due to patient increased tremors from history of Parkinson's.  EEG interpretation noted abnormal video EEG recording in the awake, drowsy, and sleep states with mild to moderate background slowing suggestive of a nonspecific encephalopathy, no persistent focal asymmetry seen, no epileptiform discharges or other epileptiform phenomena seen, no seizures.    Physical therapy evaluated patient of which documented postacute placement.  SNF referral placed.      Interval Problem Update  -Patient seen and examined.  Patient reports some mild improvement in his tremors after initiating Sinemet.  Patient reports having a bowel movement approximately 2 days ago.  Discussed with patient plan of care with transfer to skilled facility.  -Plan of care: Continue supportive care; monitor patient for worsening tremors; SNF referral placed; correcting hypokalemia of 3.0, patient has a known history of hypokalemia  -Disposition: Anticipated to stay overnight as an inpatient; pending correction of hypokalemia to be medically cleared; patient to transfer to SNF  -Lab work: Reviewed; expected  -VSS at this time    I have discussed this patient's plan of care and discharge plan at IDT rounds today with Case Management, Nursing, Nursing leadership, and other members of the IDT team.    Consultants/Specialty  cardiology and neurology    Code Status  Full Code    Disposition  Patient is not medically cleared for discharge.   Anticipate discharge to to skilled nursing facility.  I have placed the appropriate orders for post-discharge  needs.    Review of Systems  ROS     Physical Exam  Temp:  [36.4 °C (97.6 °F)-37.1 °C (98.7 °F)] 36.5 °C (97.7 °F)  Pulse:  [53-70] 59  Resp:  [16-19] 16  BP: (113-127)/(69-77) 127/73  SpO2:  [55 %-99 %] 98 %    Physical Exam    Fluids    Intake/Output Summary (Last 24 hours) at 7/19/2022 1119  Last data filed at 7/19/2022 0600  Gross per 24 hour   Intake --   Output 400 ml   Net -400 ml       Laboratory      Recent Labs     07/19/22  0034   SODIUM 141   POTASSIUM 3.0*   CHLORIDE 107   CO2 22   GLUCOSE 102*   BUN 14   CREATININE 0.77   CALCIUM 8.7                   Imaging  CT-HEAD W/O   Final Result      1.  Cerebral atrophy.      2.  Otherwise, Head CT without contrast within normal limits. No evidence of acute cerebral infarction, hemorrhage or mass lesion.         NM-CARDIAC STRESS TEST   Final Result      US-EXTREMITY VENOUS LOWER BILAT   Final Result      CT-CTA CHEST PULMONARY ARTERY W/ RECONS   Final Result      1.  No evidence of pulmonary embolism to the level of the segmental arteries.   2.  No acute cardiopulmonary abnormality.   3.  Atherosclerotic changes. Mild dilation of the ascending aorta, measuring 4.1 cm.   4.  Coronary artery atherosclerotic disease.   5.  Two 4 mm right upper lobe pulmonary nodules. Follow-up recommendations below.      Low Risk: No routine follow-up      High Risk: Optional CT at 12 months      Comments: Use most suspicious nodule as guide to management. Follow-up intervals may vary according to size and risk.      Low Risk - Minimal or absent history of smoking and of other known risk factors.      High Risk - History of smoking or of other known risk factors.      Note: These recommendations do not apply to lung cancer screening, patients with immunosuppression, or patients with known primary cancer.      Fleischner Society 2017 Guidelines for Management of Incidentally Detected Pulmonary Nodules in Adults      EC-ECHOCARDIOGRAM COMPLETE W/O CONT   Final Result       DX-CHEST-PORTABLE (1 VIEW)   Final Result      1.  No acute cardiac or pulmonary abnormalities are identified.           Assessment/Plan  * Tremor- (present on admission)  Assessment & Plan  Worsening tremor likely in setting of Parkinson , unable to rule out seizure  Sinemet dose adjusted by neurologist and recommending deep brain stimulaton as outpatient  Initial EEG unremarkable for ongoing seizure  24-hour video EEG ordered - Negative for any epilepsy/seizure      AMS (altered mental status)  Assessment & Plan  AMS, likely delirium   CT head unremarkable  UA negative    Chest pressure  Assessment & Plan  Troponin x 3 trended flat  EKG with nonsignificant ST changes  Echo unremarkable  Stress test noted with no reversible ischemia.Small sized infarct apical inferior, mid inferior segments.  Cardiology recommending discharge patient on aspirin and metoprolol and follow-up with outpatient.        Positive D dimer  Assessment & Plan  Noted a significant elevated D-dimer.  CT angio chest negative for PE.    Initial EKG noted a flutter, repeated EKG noted in sinus rhythm.  Will rule out COVID-19 negative  DVT study negative    Dyspnea- (present on admission)  Assessment & Plan  Nonfocal dyspnea, chronic, intermittent, worse on exertion  - Currently on room air  - rule out covid     Atrial flutter (HCC)- (present on admission)  Assessment & Plan  Confirmed with cardiology no a flutter noted on initial  ekg   Xarelto discontinued    Hypokalemia- (present on admission)  Assessment & Plan  Monitor and replace as needed, appears chronic  Currently at 3.0 today - replaced with 40mEq BID    Parkinson's disease (HCC)- (present on admission)  Assessment & Plan  Continue home Sinemet    Advanced care planning/counseling discussion  Assessment & Plan  -Patient currently Full Code    Essential hypertension- (present on admission)  Assessment & Plan  Blood pressure elevated on admission  - Continue home losartan  - If blood  pressure still elevated can consider IV as needed       VTE prophylaxis: enoxaparin ppx    I have performed a physical exam and reviewed and updated ROS and Plan today (7/19/2022). In review of yesterday's note (7/18/2022), there are no changes except as documented above.      =======================================================================================================+  Please note that this dictation was created using voice recognition software. I have made every reasonable attempt to correct obvious errors, but there may be errors of grammar and possibly content that I did not discover before finalizing the note.    Electronically signed by:  MULUGETA Cheney, MSN, APRN, FNP-C  Hospitalist Services  [facility]  [phone]  [email or web address]  07/19/22                  [phone]

## 2022-07-19 NOTE — CARE PLAN
The patient is Stable - Low risk of patient condition declining or worsening    Shift Goals  Clinical Goals: Rest; Mobility  Patient Goals: rest  Family Goals: rest, cath lab monday    Progress made toward(s) clinical / shift goals:  patient involved in cares able to make needs known.       Problem: Knowledge Deficit - Standard  Goal: Patient and family/care givers will demonstrate understanding of plan of care, disease process/condition, diagnostic tests and medications  Outcome: Progressing     Problem: Skin Integrity  Goal: Skin integrity is maintained or improved  Outcome: Progressing     Problem: Fall Risk  Goal: Patient will remain free from falls  Outcome: Progressing       Patient is not progressing towards the following goals:

## 2022-07-19 NOTE — THERAPY
Physical Therapy   Daily Treatment     Patient Name: Bladimir Mitchell  Age:  66 y.o., Sex:  male  Medical Record #: [id number]  Today's Date: 7/19/2022     Precautions  Precautions: Fall Risk    Assessment    Pt with increased rigidity and difficulty mobilizing, however reports he was unable to get OOB yesterday due to EEG. Focus on anterior rocking at EOB to decrease rigidity, performed x10. Then performed reciprocal marching at EOB in sitting and standing with emphasis on BIG technique to assist with rigidity from Parkinsons. Gait training completed. Initially tiny RON and festinating gait. Cues for increased step height and length which pt was able to follow. When making turns tends to freeze, cues required to resume gait. Continue to recommend placement.    Plan    Continue current treatment plan.    DC Equipment Recommendations: Unable to determine at this time  Discharge Recommendations: Recommend post-acute placement for additional physical therapy services prior to discharge home           Objective       07/19/22 0928   Cognition    Level of Consciousness Alert   Comments flat affect with microphonia but able to follow all commands   Neuro-Muscular Treatments   Neuro-Muscular Treatments Anterior weight shift;Weight Shift Right;Weight Shift Left   Comments Focus on anterior rocking at EOB to decrease rigidity, performed x10. Then performed reciprocal marching at EOB in sitting and standing with emphasis on BIG technique to assist with rigidity from Parkinsons.   Balance   Sitting Balance (Static) Fair   Sitting Balance (Dynamic) Fair -   Standing Balance (Static) Fair -   Standing Balance (Dynamic) Poor   Weight Shift Sitting Fair   Weight Shift Standing Poor   Skilled Intervention Verbal Cuing;Tactile Cuing;Sequencing   Comments FWW   Gait Analysis   Gait Level Of Assist Minimal Assist   Assistive Device Front Wheel Walker   Distance (Feet) 110   # of Times Distance was Traveled 1   Deviation  Bradykinetic;Shuffled Gait;Decreased Base Of Support   Weight Bearing Status No restrictions   Skilled Intervention Verbal Cuing;Tactile Cuing;Sequencing   Bed Mobility    Supine to Sit Moderate Assist   Sit to Supine Moderate Assist   Skilled Intervention Verbal Cuing;Tactile Cuing;Sequencing   Functional Mobility   Sit to Stand Moderate Assist   Skilled Intervention Verbal Cuing;Tactile Cuing;Sequencing   How much difficulty does the patient currently have...   Turning over in bed (including adjusting bedclothes, sheets and blankets)? 2   Sitting down on and standing up from a chair with arms (e.g., wheelchair, bedside commode, etc.) 1   Moving from lying on back to sitting on the side of the bed? 1   How much help from another person does the patient currently need...   Moving to and from a bed to a chair (including a wheelchair)? 2   Need to walk in a hospital room? 3   Climbing 3-5 steps with a railing? 2   6 clicks Mobility Score 11   Short Term Goals    Short Term Goal # 1 Pt will complete bed mobility without use of bed features with SPV within 6 visits   Goal Outcome # 1 goal not met   Short Term Goal # 2 Pt will complete functional transfers with SPV with LRAD within 6 visits to improve mobility   Goal Outcome # 2 Goal not met   Short Term Goal # 3 Pt will ambulate 200 feet with LRAD with SBA within 6 visits to improve mobility   Goal Outcome # 3 Goal not met   Short Term Goal # 4 Pt will be able to ascend/descend 2 steps to allow for community reintegration with SBA with LRAD within 6 visits   Goal Outcome # 4 Goal not met

## 2022-07-19 NOTE — CARE PLAN
Problem: Knowledge Deficit - Standard  Goal: Patient and family/care givers will demonstrate understanding of plan of care, disease process/condition, diagnostic tests and medications  Outcome: Progressing     Problem: Skin Integrity  Goal: Skin integrity is maintained or improved  Outcome: Progressing     Problem: Fall Risk  Goal: Patient will remain free from falls  Outcome: Progressing     Problem: Pain - Standard  Goal: Alleviation of pain or a reduction in pain to the patient’s comfort goal  Outcome: Progressing   The patient is Stable - Low risk of patient condition declining or worsening    Shift Goals  Clinical Goals: rest, ambulate  Patient Goals: go home, rest  Family Goals: rest, cath lab monday    Progress made toward(s) clinical / shift goals:  patient is ambulating. Patient is wearing  socks and yellow band. Pain is controlled with non-pharmacological means     Patient is not progressing towards the following goals:

## 2022-07-20 VITALS
WEIGHT: 184.08 LBS | RESPIRATION RATE: 20 BRPM | HEIGHT: 73 IN | HEART RATE: 70 BPM | OXYGEN SATURATION: 99 % | TEMPERATURE: 98 F | DIASTOLIC BLOOD PRESSURE: 61 MMHG | BODY MASS INDEX: 24.4 KG/M2 | SYSTOLIC BLOOD PRESSURE: 118 MMHG

## 2022-07-20 PROBLEM — R41.82 AMS (ALTERED MENTAL STATUS): Status: RESOLVED | Noted: 2022-07-15 | Resolved: 2022-07-20

## 2022-07-20 PROBLEM — R06.00 DYSPNEA: Status: RESOLVED | Noted: 2022-07-14 | Resolved: 2022-07-20

## 2022-07-20 PROBLEM — R07.89 CHEST PRESSURE: Status: RESOLVED | Noted: 2022-07-15 | Resolved: 2022-07-20

## 2022-07-20 PROBLEM — R79.89 POSITIVE D DIMER: Status: RESOLVED | Noted: 2022-07-15 | Resolved: 2022-07-20

## 2022-07-20 LAB
ANION GAP SERPL CALC-SCNC: 13 MMOL/L (ref 7–16)
BUN SERPL-MCNC: 16 MG/DL (ref 8–22)
CALCIUM SERPL-MCNC: 9.1 MG/DL (ref 8.5–10.5)
CHLORIDE SERPL-SCNC: 107 MMOL/L (ref 96–112)
CO2 SERPL-SCNC: 21 MMOL/L (ref 20–33)
CREAT SERPL-MCNC: 0.77 MG/DL (ref 0.5–1.4)
ERYTHROCYTE [DISTWIDTH] IN BLOOD BY AUTOMATED COUNT: 40.2 FL (ref 35.9–50)
GFR SERPLBLD CREATININE-BSD FMLA CKD-EPI: 99 ML/MIN/1.73 M 2
GLUCOSE SERPL-MCNC: 90 MG/DL (ref 65–99)
HCT VFR BLD AUTO: 41.8 % (ref 42–52)
HGB BLD-MCNC: 14.9 G/DL (ref 14–18)
MCH RBC QN AUTO: 31.3 PG (ref 27–33)
MCHC RBC AUTO-ENTMCNC: 35.6 G/DL (ref 33.7–35.3)
MCV RBC AUTO: 87.8 FL (ref 81.4–97.8)
PLATELET # BLD AUTO: 249 K/UL (ref 164–446)
PMV BLD AUTO: 9 FL (ref 9–12.9)
POTASSIUM SERPL-SCNC: 3.3 MMOL/L (ref 3.6–5.5)
RBC # BLD AUTO: 4.76 M/UL (ref 4.7–6.1)
SARS-COV+SARS-COV-2 AG RESP QL IA.RAPID: NOTDETECTED
SODIUM SERPL-SCNC: 141 MMOL/L (ref 135–145)
SPECIMEN SOURCE: NORMAL
WBC # BLD AUTO: 9.8 K/UL (ref 4.8–10.8)

## 2022-07-20 PROCEDURE — 87426 SARSCOV CORONAVIRUS AG IA: CPT

## 2022-07-20 PROCEDURE — 700102 HCHG RX REV CODE 250 W/ 637 OVERRIDE(OP): Performed by: NURSE PRACTITIONER

## 2022-07-20 PROCEDURE — 700102 HCHG RX REV CODE 250 W/ 637 OVERRIDE(OP)

## 2022-07-20 PROCEDURE — A9270 NON-COVERED ITEM OR SERVICE: HCPCS | Performed by: NURSE PRACTITIONER

## 2022-07-20 PROCEDURE — A9270 NON-COVERED ITEM OR SERVICE: HCPCS | Performed by: INTERNAL MEDICINE

## 2022-07-20 PROCEDURE — 99217 PR OBSERVATION CARE DISCHARGE: CPT | Performed by: NURSE PRACTITIONER

## 2022-07-20 PROCEDURE — 85027 COMPLETE CBC AUTOMATED: CPT

## 2022-07-20 PROCEDURE — 80048 BASIC METABOLIC PNL TOTAL CA: CPT

## 2022-07-20 PROCEDURE — A9270 NON-COVERED ITEM OR SERVICE: HCPCS

## 2022-07-20 PROCEDURE — 700102 HCHG RX REV CODE 250 W/ 637 OVERRIDE(OP): Performed by: INTERNAL MEDICINE

## 2022-07-20 RX ORDER — HYDROXYZINE HYDROCHLORIDE 25 MG/1
25 TABLET, FILM COATED ORAL 3 TIMES DAILY PRN
Status: DISCONTINUED | OUTPATIENT
Start: 2022-07-20 | End: 2022-07-20 | Stop reason: HOSPADM

## 2022-07-20 RX ORDER — POTASSIUM CHLORIDE 20 MEQ/1
20 TABLET, EXTENDED RELEASE ORAL DAILY
Qty: 7 TABLET | Refills: 0 | Status: SHIPPED
Start: 2022-07-20 | End: 2022-07-27

## 2022-07-20 RX ADMIN — AMLODIPINE BESYLATE 5 MG: 5 TABLET ORAL at 05:15

## 2022-07-20 RX ADMIN — METOPROLOL TARTRATE 25 MG: 25 TABLET, FILM COATED ORAL at 05:15

## 2022-07-20 RX ADMIN — POTASSIUM CHLORIDE 40 MEQ: 1500 TABLET, EXTENDED RELEASE ORAL at 05:15

## 2022-07-20 RX ADMIN — CARBIDOPA AND LEVODOPA 1 TABLET: 25; 100 TABLET, EXTENDED RELEASE ORAL at 08:26

## 2022-07-20 RX ADMIN — HYDROXYZINE HYDROCHLORIDE 25 MG: 25 TABLET ORAL at 12:21

## 2022-07-20 RX ADMIN — LEVETIRACETAM 500 MG: 500 TABLET, FILM COATED ORAL at 08:26

## 2022-07-20 RX ADMIN — LOSARTAN POTASSIUM 100 MG: 50 TABLET, FILM COATED ORAL at 05:15

## 2022-07-20 RX ADMIN — ASPIRIN 81 MG: 81 TABLET, COATED ORAL at 05:15

## 2022-07-20 ASSESSMENT — PATIENT HEALTH QUESTIONNAIRE - PHQ9
1. LITTLE INTEREST OR PLEASURE IN DOING THINGS: NOT AT ALL
2. FEELING DOWN, DEPRESSED, IRRITABLE, OR HOPELESS: NOT AT ALL
SUM OF ALL RESPONSES TO PHQ9 QUESTIONS 1 AND 2: 0

## 2022-07-20 NOTE — DISCHARGE PLANNING
Agency/Facility Name: [facility]   Spoke To: Susan   Outcome: DPA called to inform Pt is MC and bed availability for today. Per Susan Pt accepted today at 1300 via [facility]'s van. Susan to contact DPA with transportation confirmation.     YUMIKO Zuleta notified    [id number]  Agency/Facility Name: [facility]  Spoke To: Susan  Outcome: Transport confirmed at 1300 via [facility] transportation.

## 2022-07-20 NOTE — DISCHARGE PLANNING
TCN following. HTH/SCP chart review completed. Collaborated with YUMIKO Zuleta.  Pt anticipating transfer to [facility] today. No additional TCN needs this admission.

## 2022-07-20 NOTE — DISCHARGE SUMMARY
"Discharge Summary    CHIEF COMPLAINT ON ADMISSION  Chief Complaint   Patient presents with   • ALOC     Hx Parkinsons, mother reports \"hard breathing and shaking\". Pt mother states he could not answer questions appropriately today, EMS states same.       Reason for Admission  EMS     Admission Date  7/14/2022    CODE STATUS  Full Code    HPI & HOSPITAL COURSE    Mr. Bladimir Mitchell is a 66-year-old male with a PMHx of Parkinson's disease, hypokalemia, hypertension, who presented on 7/14/2022 due to concerns of shortness of breath.    Patient's shortness of breath was noted to be intermittent and worse with exertion associated but not associated with chest pain, dizziness, or lightheadedness.  Patient reports that shortness of breath has been a chronic issue that he has been dealing with, but has not gotten worse over the previous episodes.  On examination, patient denied shortness of breath.  Patient was brought to the hospital by his mother due to concerns of worsening condition.    In ER, patient noted to have an elevated blood pressure of 177/116, 100% on room air.  Notable lab findings noted WBC 5.7, sodium 141, potassium 3.0, troponin 20.  Initial EKG noted atrial flutter with 4:1 conduction rate.  Patient was initiated on anticoagulation, Xarelto due to new onset of atrial flutter.  An echocardiogram was then ordered which noted grossly LV systolic function is normal with normal mitral and aortic valves, right valves not seen, with normal IVC with inspiratory collapse.    Overnight on 7/15, patient noted to have an increasing shortness of breath of which a D-dimer was obtained and noted to be significantly elevated.  CTA chest was then followed of which this was negative for pulmonary embolism.  DVT study was initiated and repeat EKG was obtained which noted patient to be back in sinus rhythm.  Cardiology was also consulted, Dr. Noemy Dhillon of which patient was recommended to undergo a nuclear study to rule " out any underlying CAD.  Patient evaluated for home O2 needs along with PT/OT.    Per cardiology recommendations, consult neurology for further recommendations.  Patient does not currently have atrial flutter and Xarelto was discontinued on 7/16.  Patient also not recommended for a cardiac catheterization at this time.  Rigorous blood pressure control with goal of less than or equal to 130/80.  Patient will follow up with cardiology as an outpatient and cardiology had signed off on 7/16.    Per neurology, patient will be trialed on Sinemet.  An EEG was also recommended to follow suit due to patient increased tremors from history of Parkinson's.  EEG interpretation noted abnormal video EEG recording in the awake, drowsy, and sleep states with mild to moderate background slowing suggestive of a nonspecific encephalopathy, no persistent focal asymmetry seen, no epileptiform discharges or other epileptiform phenomena seen, no seizures.    Physical therapy evaluated patient of which documented postacute placement.  SNF referral placed.    Patient seen and examined prior to being discharged.  Patient still endorses some weakness and increased tremors.  Discussed with patient staying compliant with Parkinson's medications.  Patient to continue physical therapy at [facility].  Patient also has a known hypokalemia.  She will continue oral supplements at [facility] for 7 days.  Patient to resume all other home medications.  Patient to follow-up with PCP as indicated.  All questions and concerns answered prior to being discharged.  Patient discharged home.    Therefore, he is discharged in good and stable condition to home with close outpatient follow-up.    The patient met 2-midnight criteria for an inpatient stay at the time of discharge.    Discharge Date  07/20/22      FOLLOW UP ITEMS POST DISCHARGE  Please call [phone] to schedule PCP appointment for patient.    Required specialty appointments include:       Discharge  Instructions per ADRYAN Jasso    -[facility] and continue therapy  -Resume all home medication      DIET: As tolerated    ACTIVITY: As tolerated    DIAGNOSIS: Shortness of breath    Return to ER if symptoms persist, chest pain, palpitations, shortness of breath, numbness, tingling, weakness, and high fevers.        DISCHARGE DIAGNOSES  Principal Problem:    Tremor POA: Yes  Active Problems:    Essential hypertension POA: Yes      Overview: Sitting  this morning at 9AM. Standing  Took 5 mg amlodipine       at 10AM. Patient initially said she retook BP at home at 11AM, and BP       improved, but later denied taking repeat BP at home. Reports that only BP       after med was in the clinic. In clinic at 1PM 138/77. Neurologist stopped       amlodipine because he thought it may be contributing to passing out.           Advanced care planning/counseling discussion POA: Unknown      Overview: Colonoscopy in 2017 by JAN BIRCH    Parkinson's disease (HCC) POA: Yes    Hypokalemia POA: Yes    Atrial flutter (HCC) POA: Yes  Resolved Problems:    Dyspnea POA: Yes    Positive D dimer POA: Unknown    Chest pressure POA: Unknown    AMS (altered mental status) POA: Unknown      FOLLOW UP  Future Appointments   Date Time Provider Department Center   8/10/2022 10:40 AM Carroll Desai M.D. [facility] [place]   8/23/2022  9:15 AM [facility] EXAM 6 [facility]   9/2/2022  3:40 PM Nasir Phipps D.O. [facility] None   9/20/2022  9:00 AM Nasir Hooper M.D. [facility] None     Lety Belcher M.D.  [address] [facility] [address]  [phone]    In 2 days      [address] [address]  [phone]  Go to        MEDICATIONS ON DISCHARGE     Medication List      START taking these medications      Instructions   potassium chloride SA 20 MEQ Tbcr  Commonly known as: Kdur   Take 1 Tablet by mouth every day for 7  days.  Dose: 20 mEq        CONTINUE taking these medications      Instructions   amLODIPine 5 MG Tabs  Commonly known as: NORVASC   Take 5 mg by mouth every day.  Dose: 5 mg     atorvastatin 40 MG Tabs  Commonly known as: LIPITOR   Take 1 Tab by mouth every day.  Dose: 40 mg     carbidopa-levodopa CR  MG per tablet  Commonly known as: SINEMET CR   Take 1 Tablet by mouth 3 times a day.  Dose: 1 Tablet     docusate sodium 100 MG Caps  Commonly known as: COLACE   Take 100 mg by mouth 3 times a day as needed for Constipation.  Dose: 100 mg     DRY EYES OP   Administer 1 Drop into both eyes every 48 hours.  Dose: 1 Drop     levETIRAcetam 500 MG Tabs  Commonly known as: Keppra   Take 1 Tablet by mouth 2 times a day.  Dose: 500 mg     losartan 100 MG Tabs  Commonly known as: COZAAR   Take 1 Tablet by mouth every day.  Dose: 100 mg     MULTIVITAMIN ADULT PO   Take 1 Tablet by mouth every day.  Dose: 1 Tablet            Allergies  No Known Allergies    DIET  Orders Placed This Encounter   Procedures   • Diet Order Diet: Level 6 - Soft and Bite Sized; Liquid level: Level 0 - Thin     Standing Status:   Standing     Number of Occurrences:   1     Order Specific Question:   Diet:     Answer:   Level 6 - Soft and Bite Sized [23]     Order Specific Question:   Liquid level     Answer:   Level 0 - Thin       ACTIVITY  As tolerated.  Weight bearing as tolerated    CONSULTATIONS  NONE    PROCEDURES  NONE    IMAGING  CT-HEAD W/O   Final Result      1.  Cerebral atrophy.      2.  Otherwise, Head CT without contrast within normal limits. No evidence of acute cerebral infarction, hemorrhage or mass lesion.         NM-CARDIAC STRESS TEST   Final Result      US-EXTREMITY VENOUS LOWER BILAT   Final Result      CT-CTA CHEST PULMONARY ARTERY W/ RECONS   Final Result      1.  No evidence of pulmonary embolism to the level of the segmental arteries.   2.  No acute cardiopulmonary abnormality.   3.  Atherosclerotic changes. Mild dilation  of the ascending aorta, measuring 4.1 cm.   4.  Coronary artery atherosclerotic disease.   5.  Two 4 mm right upper lobe pulmonary nodules. Follow-up recommendations below.      Low Risk: No routine follow-up      High Risk: Optional CT at 12 months      Comments: Use most suspicious nodule as guide to management. Follow-up intervals may vary according to size and risk.      Low Risk - Minimal or absent history of smoking and of other known risk factors.      High Risk - History of smoking or of other known risk factors.      Note: These recommendations do not apply to lung cancer screening, patients with immunosuppression, or patients with known primary cancer.      Fleischner Society 2017 Guidelines for Management of Incidentally Detected Pulmonary Nodules in Adults      EC-ECHOCARDIOGRAM COMPLETE W/O CONT   Final Result      DX-CHEST-PORTABLE (1 VIEW)   Final Result      1.  No acute cardiac or pulmonary abnormalities are identified.            LABORATORY  Lab Results   Component Value Date    SODIUM 141 07/20/2022    POTASSIUM 3.3 (L) 07/20/2022    CHLORIDE 107 07/20/2022    CO2 21 07/20/2022    GLUCOSE 90 07/20/2022    BUN 16 07/20/2022    CREATININE 0.77 07/20/2022        Lab Results   Component Value Date    WBC 9.8 07/20/2022    HEMOGLOBIN 14.9 07/20/2022    HEMATOCRIT 41.8 (L) 07/20/2022    PLATELETCT 249 07/20/2022        Total time of the discharge process took 36 minutes.    ========================================================================================================  Please note that this dictation was created using voice recognition software. I have made every reasonable attempt to correct obvious errors, but there may be errors of grammar and possibly content that I did not discover before finalizing the note.    Electronically signed by:  MULUGETA Cheney, MSN, APRN, FNP-C  Hospitalist Services  [facility]  [phone]  [email or web address]  07/20/22                  [phone]

## 2022-07-20 NOTE — DISCHARGE PLANNING
Patient accepted to [facility]. COBRA filled out. Spoke with patient at bedside and COBRA signed by patient. Discussed with patient and patient states he will notify mother of transfer. Jakub GARCIA updated.

## 2022-07-20 NOTE — CARE PLAN
The patient is Stable - Low risk of patient condition declining or worsening    Shift Goals  Clinical Goals: rest, ambulate  Patient Goals: go home, rest  Family Goals: rest, cath lab monday    Progress made toward(s) clinical / shift goals:  patient to be discharged in the morning. No complaints of pain, up ad johanny for needs.       Problem: Knowledge Deficit - Standard  Goal: Patient and family/care givers will demonstrate understanding of plan of care, disease process/condition, diagnostic tests and medications  Outcome: Progressing     Problem: Fall Risk  Goal: Patient will remain free from falls  Outcome: Progressing     Problem: Pain - Standard  Goal: Alleviation of pain or a reduction in pain to the patient’s comfort goal  Outcome: Progressing       Patient is not progressing towards the following goals:

## 2022-07-20 NOTE — CARE PLAN
The patient is Stable - Low risk of patient condition declining or worsening    Shift Goals  Clinical Goals: rest, ambulate  Patient Goals: go home, rest  Family Goals: rest, cath lab monday    Progress made toward(s) clinical / shift goals:  Patient has no complaints of pain, bed alarm on and in place for patient safety. Waffle pad in place for sleep.       Problem: Skin Integrity  Goal: Skin integrity is maintained or improved  Outcome: Progressing     Problem: Fall Risk  Goal: Patient will remain free from falls  7/20/2022 0216 by Bryan Vann R.N.  Outcome: Progressing  7/20/2022 0213 by Bryan Vann R.N.  Outcome: Progressing     Problem: Pain - Standard  Goal: Alleviation of pain or a reduction in pain to the patient’s comfort goal  7/20/2022 0216 by Bryan Vann, R.N.  Outcome: Progressing  7/20/2022 0213 by Bryan Vann, R.N.  Outcome: Progressing       Patient is not progressing towards the following goals:

## 2022-07-20 NOTE — PROGRESS NOTES
Report given to JOSE Sam. Patient will be transported to [facility] at approximately 1300.

## 2022-07-28 DIAGNOSIS — R40.20 LOSS OF CONSCIOUSNESS (HCC): ICD-10-CM

## 2022-07-29 RX ORDER — LEVETIRACETAM 500 MG/1
TABLET ORAL
Qty: 60 TABLET | Refills: 2 | Status: SHIPPED | OUTPATIENT
Start: 2022-07-29

## 2022-08-23 PROCEDURE — 99999 PR NO CHARGE: CPT | Performed by: INTERNAL MEDICINE

## 2022-09-17 ENCOUNTER — TELEPHONE (OUTPATIENT)
Dept: CARDIOLOGY | Facility: MEDICAL CENTER | Age: 67
End: 2022-09-17

## 2022-11-03 ENCOUNTER — DOCUMENTATION (OUTPATIENT)
Dept: HEALTH INFORMATION MANAGEMENT | Facility: OTHER | Age: 67
End: 2022-11-03

## 2022-11-08 NOTE — PROGRESS NOTES
Addended by: Catherine Cooper on: 11/8/2022 02:25 PM     Modules accepted: Orders Pt mother, Tania, updated on POC

## 2025-06-23 NOTE — PROGRESS NOTES
Patient has been sleeping without interruption all night.  No s/s of distress.  Monitoring continues    Tele strip per monitor room  Sinus Gil Sinus Rhythm   Ectopy- PVC's PAC's Trigemini  HR-50's-70's  KS-.15  QRS-.09  QT-.34